# Patient Record
Sex: FEMALE | Race: BLACK OR AFRICAN AMERICAN | NOT HISPANIC OR LATINO | Employment: FULL TIME | URBAN - METROPOLITAN AREA
[De-identification: names, ages, dates, MRNs, and addresses within clinical notes are randomized per-mention and may not be internally consistent; named-entity substitution may affect disease eponyms.]

---

## 2017-03-29 ENCOUNTER — HOSPITAL ENCOUNTER (OUTPATIENT)
Dept: RADIOLOGY | Facility: HOSPITAL | Age: 57
Discharge: HOME | End: 2017-03-29
Attending: INTERNAL MEDICINE
Payer: COMMERCIAL

## 2017-03-29 ENCOUNTER — OFFICE VISIT (OUTPATIENT)
Dept: INTERNAL MEDICINE | Facility: CLINIC | Age: 57
End: 2017-03-29
Payer: COMMERCIAL

## 2017-03-29 VITALS
WEIGHT: 215.19 LBS | SYSTOLIC BLOOD PRESSURE: 120 MMHG | HEIGHT: 66 IN | HEART RATE: 70 BPM | OXYGEN SATURATION: 99 % | BODY MASS INDEX: 34.58 KG/M2 | DIASTOLIC BLOOD PRESSURE: 80 MMHG

## 2017-03-29 DIAGNOSIS — M54.31 SCIATICA OF RIGHT SIDE: ICD-10-CM

## 2017-03-29 DIAGNOSIS — M17.0 PRIMARY OSTEOARTHRITIS OF BOTH KNEES: ICD-10-CM

## 2017-03-29 DIAGNOSIS — M25.562 ACUTE PAIN OF BOTH KNEES: ICD-10-CM

## 2017-03-29 DIAGNOSIS — M25.561 ACUTE PAIN OF BOTH KNEES: Primary | ICD-10-CM

## 2017-03-29 DIAGNOSIS — M25.562 ACUTE PAIN OF BOTH KNEES: Primary | ICD-10-CM

## 2017-03-29 DIAGNOSIS — M25.561 ACUTE PAIN OF BOTH KNEES: ICD-10-CM

## 2017-03-29 DIAGNOSIS — E66.01 MORBID OBESITY, UNSPECIFIED OBESITY TYPE: ICD-10-CM

## 2017-03-29 PROCEDURE — 99213 OFFICE O/P EST LOW 20 MIN: CPT | Mod: S$GLB,,, | Performed by: INTERNAL MEDICINE

## 2017-03-29 PROCEDURE — 73562 X-RAY EXAM OF KNEE 3: CPT | Mod: 50,TC

## 2017-03-29 PROCEDURE — 99999 PR PBB SHADOW E&M-EST. PATIENT-LVL IV: CPT | Mod: PBBFAC,,, | Performed by: INTERNAL MEDICINE

## 2017-03-29 PROCEDURE — 73562 X-RAY EXAM OF KNEE 3: CPT | Mod: 26,50,, | Performed by: RADIOLOGY

## 2017-03-29 PROCEDURE — 1160F RVW MEDS BY RX/DR IN RCRD: CPT | Mod: S$GLB,,, | Performed by: INTERNAL MEDICINE

## 2017-03-29 NOTE — MR AVS SNAPSHOT
"    [facility]  [address]  [place] [address]  Phone: [phone]  Fax: [phone]                  Albertina Sim   3/29/2017 8:40 AM   Office Visit    Description:  Female : 1960   Provider:  Lindsay Cohen MD   Department:  [facility]           Reason for Visit     Leg Pain           Diagnoses this Visit        Comments    Acute pain of both knees    -  Primary     Morbid obesity, unspecified obesity type         Sciatica of right side         Primary osteoarthritis of both knees                To Do List           Goals (5 Years of Data)     None      [facility]     [facility] Care [facility] -  Assistance  Registered nurses in the [facility] Center provide clinical advisement, health education, appointment booking, and other advisory services.  Call for this free service at [phone].             Medications           Message regarding Medications     Verify the changes and/or additions to your medication regime listed below are the same as discussed with your clinician today.  If any of these changes or additions are incorrect, please notify your healthcare provider.        STOP taking these medications     naproxen sodium (ANAPROX) 220 MG tablet Take 220 mg by mouth every 12 (twelve) hours.           Verify that the below list of medications is an accurate representation of the medications you are currently taking.  If none reported, the list may be blank. If incorrect, please contact your healthcare provider. Carry this list with you in case of emergency.           Current Medications     LORATADINE (CLARITIN ORAL) Take by mouth.           Clinical Reference Information           Your Vitals Were     BP Pulse Height Weight SpO2 BMI    120/80 70 5' 6" (1.676 m) 97.6 kg (215 lb 2.7 oz) 99% 34.73 kg/m2      Blood Pressure          Most Recent Value    BP  120/80      Allergies as of 3/29/2017     No Known Drug Allergies    "   Immunizations Administered on Date of Encounter - 3/29/2017     None      Orders Placed During Today's Visit      Normal Orders This Visit    Ambulatory Referral to Orthopedics     Ambulatory Referral to Physical/Occupational Therapy     Future Labs/Procedures Expected by Expires    X-Ray Knee 3 View Bilateral  3/29/2017 3/29/2018      Language Assistance Services     ATTENTION: Language assistance services are available, free of charge. Please call [phone].      ATENCIÓN: Si habla español, tiene a benavides disposición servicios gratuitos de asistencia lingüística. Llame al [phone].     CHÚ Ý: N?u b?n nói Ti?ng Vi?t, có các d?ch v? h? tr? ngôn ng? mi?n phí dành cho b?n. G?i s? [phone].         [facility] complies with applicable Federal civil rights laws and does not discriminate on the basis of race, color, national origin, age, disability, or sex.

## 2017-03-30 ENCOUNTER — OFFICE VISIT (OUTPATIENT)
Dept: ORTHOPEDICS | Facility: CLINIC | Age: 57
End: 2017-03-30
Payer: COMMERCIAL

## 2017-03-30 VITALS
HEART RATE: 73 BPM | DIASTOLIC BLOOD PRESSURE: 80 MMHG | BODY MASS INDEX: 34.58 KG/M2 | WEIGHT: 215.19 LBS | HEIGHT: 66 IN | SYSTOLIC BLOOD PRESSURE: 114 MMHG | RESPIRATION RATE: 16 BRPM

## 2017-03-30 DIAGNOSIS — M17.0 OSTEOARTHRITIS OF BOTH KNEES, UNSPECIFIED OSTEOARTHRITIS TYPE: Primary | ICD-10-CM

## 2017-03-30 PROCEDURE — 99203 OFFICE O/P NEW LOW 30 MIN: CPT | Mod: S$GLB,,, | Performed by: PHYSICIAN ASSISTANT

## 2017-03-30 PROCEDURE — 99999 PR PBB SHADOW E&M-EST. PATIENT-LVL III: CPT | Mod: PBBFAC,,, | Performed by: PHYSICIAN ASSISTANT

## 2017-03-30 PROCEDURE — 1160F RVW MEDS BY RX/DR IN RCRD: CPT | Mod: S$GLB,,, | Performed by: PHYSICIAN ASSISTANT

## 2017-03-30 RX ORDER — MELOXICAM 15 MG/1
15 TABLET ORAL DAILY
Qty: 30 TABLET | Refills: 0 | Status: SHIPPED | OUTPATIENT
Start: 2017-03-30 | End: 2017-04-29

## 2017-03-30 NOTE — LETTER
March 31, 2017      Lindsay Cohen MD  [address]  [place] [address]           [facility]  [address]  [facility] [address]  Phone: [phone]          Patient: Albertina Sim   MR Number: [id number]   YOB: 1960   Date of Visit: 3/30/2017       Dear Dr. Lindsay Cohen:    Thank you for referring Albertina Sim to me for evaluation. Attached you will find relevant portions of my assessment and plan of care.    If you have questions, please do not hesitate to call me. I look forward to following Albertina Sim along with you.    Sincerely,    Dolores Canela PA-C    Enclosure  CC:  No Recipients    If you would like to receive this communication electronically, please contact [email or web address] or [phone] to request more information on [facility] Link access.    For providers and/or their staff who would like to refer a patient to [facility], please contact us through our one-stop-shop provider referral line, [facility], at [phone].    If you feel you have received this communication in error or would no longer like to receive these types of communications, please e-mail [email or web address]

## 2017-03-31 NOTE — PROGRESS NOTES
Subjective:      Patient ID: Albertina Sim is a 57 y.o. female.    Chief Complaint: Pain of the Left Knee and Pain of the Right Knee    HPI    Patient is a 57 year old female who presents to clinic with chief complaint of a-traumatic intermittent bilateral right greater than left knee pain x years. Pain is increased with walking. Patient has taken Aleve which helps some. Patient denied locking catching popping feeling unstable.   She is symptom free today.      Review of Systems   Constitution: Negative for chills and fever.   Cardiovascular: Negative for chest pain.   Respiratory: Negative for cough and shortness of breath.    Skin: Negative for color change, dry skin, itching, nail changes, poor wound healing and rash.   Musculoskeletal: Positive for joint pain.   Neurological: Negative for dizziness.   Psychiatric/Behavioral: Negative for altered mental status. The patient is not nervous/anxious.    All other systems reviewed and are negative.        Objective:            General    Constitutional: She is oriented to person, place, and time. She appears well-developed and well-nourished. No distress.   HENT:   Head: Atraumatic.   Eyes: Conjunctivae are normal.   Cardiovascular: Normal rate.    Pulmonary/Chest: Effort normal.   Neurological: She is alert and oriented to person, place, and time.   Psychiatric: She has a normal mood and affect. Her behavior is normal.           Right Knee Exam     Tenderness   The patient is tender to palpation of the medial joint line.    Range of Motion   Extension: normal   Flexion: 110     Tests   Ligament Examination Lachman: normal (-1 to 2mm) PCL-Posterior Drawer: normal (0 to 2mm)     MCL - Valgus: normal (0 to 2mm)  LCL - Varus: normal    Other   Sensation: normal    Left Knee Exam     Inspection   Swelling: absent  Bruising: absent    Tenderness   The patient is experiencing no tenderness.         Range of Motion   The patient has normal left knee ROM.    Tests   Stability  Lachman: normal (-1 to 2mm) PCL-Posterior Drawer: normal (0 to 2mm)  MCL - Valgus: normal (0 to 2mm)  LCL - Varus: normal (0 to 2mm)    Other   Sensation: normal    Muscle Strength   Right Lower Extremity   Quadriceps:  5/5   Hamstrin/5   Left Lower Extremity   Quadriceps:  5/5   Hamstrin/5       RADS: no fracture or dislocation, advanced degenerative changes to right knee.         Assessment:       Encounter Diagnosis   Name Primary?    Osteoarthritis of both knees, unspecified osteoarthritis type Yes          Plan:       Discussed treatment options with patient including lifestyle modifications, oral medication, injection and surgical consult. At this time patient would like to:   - rest ice elevate  - life style modification  - Order placed for Mobic x 2 weeks\  - return to clinic as needed, if continue pain then consider injection.

## 2017-03-31 NOTE — PROGRESS NOTES
Subjective:       Patient ID: Albertina Sim is a 57 y.o. female.    Chief Complaint: Leg Pain (both legs)  This note was created with the use of Dragon dictation  She is most concerned about pain in her knees.  Her right knee is worse than her left.  She has a small min of fluid.  She is taking [place] to [facility], in August and needs to be able to walk.  Her knees are painful she's taking 2 Aleve in the morning and 2 Aleve in the evening.    She's not sleeping well.  She is concerned about her weight gain    She is able to ride a bicycle.  She is not able to ride her bike to and from work.  She is working at the [employer].  HPI  Review of Systems   Constitutional: Negative for activity change, appetite change, chills, fatigue, fever and unexpected weight change.   HENT: Negative for hearing loss.    Eyes: Negative for visual disturbance.   Respiratory: Negative for cough, chest tightness, shortness of breath and wheezing.    Cardiovascular: Negative for chest pain, palpitations and leg swelling.   Gastrointestinal: Negative for abdominal pain, constipation, nausea and vomiting.   Genitourinary: Negative for dysuria, frequency and urgency.   Musculoskeletal: Positive for arthralgias, gait problem and joint swelling. Negative for back pain and myalgias.   Skin: Negative for rash.   Neurological: Negative for light-headedness and headaches.   Psychiatric/Behavioral: Negative for dysphoric mood and sleep disturbance. The patient is not nervous/anxious.        Objective:      Physical Exam   Constitutional: She appears well-nourished.   HENT:   Head: Atraumatic.   Eyes: Conjunctivae are normal. No scleral icterus.   Neck: Neck supple.   Cardiovascular: Normal rate and regular rhythm.    Pulmonary/Chest: Effort normal and breath sounds normal.   Abdominal: Soft. There is no tenderness.   Musculoskeletal: She exhibits no edema.   Slight warmth, no redness, ballotable effusion mostly in the popliteal  fossa   Lymphadenopathy:     She has no cervical adenopathy.   Neurological: She is alert.   Skin: Skin is warm and dry.   Psychiatric: She has a normal mood and affect. Her behavior is normal.   Nursing note and vitals reviewed.      Assessment:       1. Acute pain of both knees    2. Morbid obesity, unspecified obesity type    3. Sciatica of right side    4. Primary osteoarthritis of both knees        Plan:   Albertina was seen today for leg pain.    Diagnoses and all orders for this visit:    Acute pain of both knees  -     X-Ray Knee 3 View Bilateral; Future  -     Ambulatory Referral to Orthopedics    Morbid obesity, unspecified obesity type.  Different strategies discussed.    Sciatica of right side  -     X-Ray Knee 3 View Bilateral; Future  -     Ambulatory Referral to Orthopedics    Primary osteoarthritis of both knees.  -     X-Ray Knee 3 View Bilateral; Future  -     Ambulatory Referral to Physical/Occupational Therapy  -     Ambulatory Referral to Orthopedics

## 2017-04-06 ENCOUNTER — CLINICAL SUPPORT (OUTPATIENT)
Dept: REHABILITATION | Facility: HOSPITAL | Age: 57
End: 2017-04-06
Attending: INTERNAL MEDICINE
Payer: COMMERCIAL

## 2017-04-06 DIAGNOSIS — M25.562 CHRONIC PAIN OF LEFT KNEE: ICD-10-CM

## 2017-04-06 DIAGNOSIS — G89.29 CHRONIC PAIN OF LEFT KNEE: ICD-10-CM

## 2017-04-06 DIAGNOSIS — M17.12 PRIMARY OSTEOARTHRITIS OF LEFT KNEE: Primary | ICD-10-CM

## 2017-04-06 PROCEDURE — G8978 MOBILITY CURRENT STATUS: HCPCS | Mod: CK | Performed by: INTERNAL MEDICINE

## 2017-04-06 PROCEDURE — 97162 PT EVAL MOD COMPLEX 30 MIN: CPT | Performed by: INTERNAL MEDICINE

## 2017-04-06 PROCEDURE — G8979 MOBILITY GOAL STATUS: HCPCS | Mod: CJ | Performed by: INTERNAL MEDICINE

## 2017-04-06 PROCEDURE — 97110 THERAPEUTIC EXERCISES: CPT | Performed by: INTERNAL MEDICINE

## 2017-04-06 NOTE — PLAN OF CARE
Please sign and return plan of care. Thank you for this referral.    Physician:Lindsay Cohen MD  Diagnosis:   Encounter Diagnoses   Name Primary?    Primary osteoarthritis of left knee Yes    Chronic pain of left knee       Orders:  Physical Therapy evaluate and treat  Date of eval: 4/6/17    Subjective:    Onset of pain /Mechanism of Onset:  R knee pain and edema > 2 yrs. Also L knee pain and lbp/ occas L sciatic pain.    Chief complaint:  R ant knee pain and wants to learn some strengthening ex. Also trying to lose weight to help with knee and bp but has diff ex due to increased pain.  Radicular symptoms:  L LE occas with prolonged walking such as 74997 steps per day while on a trip in march.  Aggravating factors:   Walking > 1 block, standing  Easing factors:  Rest. Has an ice pack at home but doesn't regularly use it  :   Previous functional status includes    Walking up to 10343 steps per day on 2 trips in last 3 mos  Current functional status:   yoga    Medication:  Reviewed in EPIC  Medical history : unremarkable      Special tests:    MRI:   Reports dx 2000 with bulging disc lb    Xray    3/29/17 AP standing knees, lateral view both knees and sunrise view of patella.  There is severe narrowing of the right medial femoral-tibial compartment with associated osteophyte formation.  Left femoral-tibial joint is well-maintained.  Degenerative change about the right patellofemoral joint as well.  No effusion.    Impression significant hypertrophic degenerative change right knee.    2014 Multilevel degenerative changes as discussed above, somewhat more pronounced than on the 2006 examination noted above, with progressive disc narrowing and more pronounced vertebral endplate spurring seen on the current study.  No evidence of compression   fracture.    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  L LE 10/10 occas, 0 presently  Pts goals:  Decrease pain, ex without making sx's worse    OBJECTIVE :  Amb I genu varus R   Pleasant, nad    4/6 Functional Limitations Reports - G Codes  Category: mobility  Tool: FOTO knee  Score: 57   TEST SCORE  Modifier  Impairment Limitation Restriction    80/80  CH  0 % impaired, limited or restricted   64-79/80  CI  @ least 1% but less than 20% impaired, limited or restricted   49-63/80  CJ  @ least 20%<40% impaired, limited or restricted   33-48/80  CK  @ least 40%<60% impaired, limited or restricted   17-32/80  CL  @ least 60% <80% impaired, limited or restricted   1-16/80  CM  @ least 80%<100% impaired limited or restricted   0/80  CN  100% impaired, limited or restricted     Current/  40-60  Goal/ : 20-40  Discharge/   NA      MMT:    Hip flex B 5  Hip abd R5, L 4-  Hip ext R 5, L 4+  Knee ext R 4, L 4+  Knee flex R 4+. L 5  DF B 5      AROM:  Knee ext  R- 3, L + 5  Knee flex R 120 ant knee pain, L 130    Flexibility testing:  HS flex WFL B    Special tests:    lachman's - R  Varus/ valgus neg R    Palpation/ joint mob:   Mod edema R Knee, decreased R patellar jm and quad contraction    TREATMENT:    Pt was provided with a written copy of exercises to perform as tolerated at home.  Pt performed rom and strengthening ex for le and core including:    Sl hip abd 15x  slr B 15x   QS 10x  Prone flex strap 2 min  Prone hang 3 min- 10 min  Prone hip ext 15x  laq 15x  Gr 2 patellar jm inf / med R 20 sec x 3  Ther ex time: 20 min  Exercises were reviewed and pt was able to demonstrate them prior to the end of the session.   Modalities:  Np.   Pt   was provided educational information, including: ice 10-20 min after walking in park  Ed in benefits of pool PT; she doesn't want to do formal pool PT now. Hasn't activated her free mo in [facility] but plans to start soon. Ed in trying to walk  in pool for LBP and R knee discomfort.    ASSESSMENT:  PT diagnosis: R knee oa, decreased rom and pain   Patient can benefit from outpatient physical therapy and a home program  Prognosis is Good.    Medical necessity is demonstrated by the following  IMPAIRMENTS:  Unable to participate in daily activities, Pain limits function of effected part for some activities, Requires skilled supervision to complete and progress HEP, Weakness and Edema    GOALS:    12_   weeks. Pt agrees with goals.  1. Independent with HEP.  2. Report decreased    R knee    pain  <   / =  3  /10 with adls such as walking  3. Increased MMT  for  R KE, L hip abd   4. Increased arom  for  R knee 0-125  5. Improved FOTO score mobility knee to 20-40% mobility      History  Co-morbidities and personal factors that may impact the plan of care  Moderate    Evolving Clinical Presentation    Co-morbidities:       Personal Factors:     Body regions    Body systems    Activity Limitations    Participation Restrictons      1- 2  Personal factors/ combordities:     LBP hx, L sciatica and weakness increasing R LE compensatio    Address  3 + elements:     ROM impaired- R Knee  MMT impaired- LE  Gait impaired R genu varus  Decreased FOTO score            PLAN:  Outpatient physical therapy 1- 2  times weekly to include: pt ed, hep, therapeutic exercises, neuromuscular re-education/ balance exercises, joint mobilizations, modalities prn, and aquatic therapy.  Pt may see PTA as part of treatment plan.  Cont PT for  12        weeks.   I certify the need for these services   furnished under this plan of treatment and while under my   care.____________________________________ Physician/Referring Practitioner Date   of Signature

## 2017-04-06 NOTE — PROGRESS NOTES
Physician:Lindsay Cohen MD  Diagnosis:   Encounter Diagnoses   Name Primary?    Primary osteoarthritis of left knee Yes    Chronic pain of left knee       Orders:  Physical Therapy evaluate and treat  Date of eval: 4/6/17    Subjective:    Onset of pain /Mechanism of Onset:  R knee pain and edema > 2 yrs. Also L knee pain and lbp/ occas L sciatic pain.    Chief complaint:  R ant knee pain and wants to learn some strengthening ex. Also trying to lose weight to help with knee and bp but has diff ex due to increased pain.  Radicular symptoms:  L LE occas with prolonged walking such as 79494 steps per day while on a trip in march.  Aggravating factors:   Walking > 1 block, standing  Easing factors:  Rest. Has an ice pack at home but doesn't regularly use it  :   Previous functional status includes    Walking up to 02720 steps per day on 2 trips in last 3 mos  Current functional status:   yoga    Medication:  Reviewed in EPIC  Medical history : unremarkable      Special tests:    MRI:   Reports dx 2000 with bulging disc lb    Xray    3/29/17 AP standing knees, lateral view both knees and sunrise view of patella.  There is severe narrowing of the right medial femoral-tibial compartment with associated osteophyte formation.  Left femoral-tibial joint is well-maintained.  Degenerative change about the right patellofemoral joint as well.  No effusion.    Impression significant hypertrophic degenerative change right knee.    2014 Multilevel degenerative changes as discussed above, somewhat more pronounced than on the 2006 examination noted above, with progressive disc narrowing and more pronounced vertebral endplate spurring seen on the current study.  No evidence of compression   fracture.    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  L LE 10/10 occas, 0 presently  Pts goals:  Decrease pain, ex without making sx's worse    OBJECTIVE :  Amb I genu varus R   Pleasant, nad    4/6 Functional Limitations Reports - G Codes  Category: mobility  Tool: FOTO knee  Score: 57   TEST SCORE  Modifier  Impairment Limitation Restriction    80/80  CH  0 % impaired, limited or restricted   64-79/80  CI  @ least 1% but less than 20% impaired, limited or restricted   49-63/80  CJ  @ least 20%<40% impaired, limited or restricted   33-48/80  CK  @ least 40%<60% impaired, limited or restricted   17-32/80  CL  @ least 60% <80% impaired, limited or restricted   1-16/80  CM  @ least 80%<100% impaired limited or restricted   0/80  CN  100% impaired, limited or restricted     Current/  40-60  Goal/ : 20-40  Discharge/   NA      MMT:    Hip flex B 5  Hip abd R5, L 4-  Hip ext R 5, L 4+  Knee ext R 4, L 4+  Knee flex R 4+. L 5  DF B 5      AROM:  Knee ext  R- 3, L + 5  Knee flex R 120 ant knee pain, L 130    Flexibility testing:  HS flex WFL B    Special tests:    lachman's - R  Varus/ valgus neg R    Palpation/ joint mob:   Mod edema R Knee, decreased R patellar jm and quad contraction    TREATMENT:    Pt was provided with a written copy of exercises to perform as tolerated at home.  Pt performed rom and strengthening ex for le and core including:    Sl hip abd 15x  slr B 15x   QS 10x  Prone flex strap 2 min  Prone hang 3 min- 10 min  Prone hip ext 15x  laq 15x  Gr 2 patellar jm inf / med R 20 sec x 3  Ther ex time: 20 min  Exercises were reviewed and pt was able to demonstrate them prior to the end of the session.   Modalities:  Np.   Pt   was provided educational information, including: ice 10-20 min after walking in park  Ed in benefits of pool PT; she doesn't want to do formal pool PT now. Hasn't activated her free mo in [facility] but plans to start soon. Ed in trying to walk in pool for LBP and R knee discomfort.    ASSESSMENT:  PT diagnosis: R knee  oa, decreased rom and pain   Patient can benefit from outpatient physical therapy and a home program  Prognosis is Good.    Medical necessity is demonstrated by the following  IMPAIRMENTS:  Unable to participate in daily activities, Pain limits function of effected part for some activities, Requires skilled supervision to complete and progress HEP, Weakness and Edema    GOALS:    12_   weeks. Pt agrees with goals.  1. Independent with HEP.  2. Report decreased    R knee    pain  <   / =  3  /10 with adls such as walking  3. Increased MMT  for  R KE, L hip abd   4. Increased arom  for  R knee 0-125  5. Improved FOTO score mobility knee to 20-40% mobility      History  Co-morbidities and personal factors that may impact the plan of care  Moderate    Evolving Clinical Presentation    Co-morbidities:       Personal Factors:     Body regions    Body systems    Activity Limitations    Participation Restrictons      1- 2  Personal factors/ combordities:     LBP hx, L sciatica and weakness increasing R LE compensatio    Address  3 + elements:     ROM impaired- R Knee  MMT impaired- LE  Gait impaired R genu varus  Decreased FOTO score            PLAN:  Outpatient physical therapy 1- 2  times weekly to include: pt ed, hep, therapeutic exercises, neuromuscular re-education/ balance exercises, joint mobilizations, modalities prn, and aquatic therapy.  Pt may see PTA as part of treatment plan.  Cont PT for  12        weeks.   I certify the need for these services   furnished under this plan of treatment and while under my   care.____________________________________ Physician/Referring Practitioner Date   of Signature

## 2017-04-20 ENCOUNTER — CLINICAL SUPPORT (OUTPATIENT)
Dept: REHABILITATION | Facility: HOSPITAL | Age: 57
End: 2017-04-20
Attending: INTERNAL MEDICINE
Payer: COMMERCIAL

## 2017-04-20 DIAGNOSIS — M25.562 CHRONIC PAIN OF LEFT KNEE: Primary | ICD-10-CM

## 2017-04-20 DIAGNOSIS — G89.29 CHRONIC PAIN OF LEFT KNEE: Primary | ICD-10-CM

## 2017-04-20 PROCEDURE — 97110 THERAPEUTIC EXERCISES: CPT | Performed by: INTERNAL MEDICINE

## 2017-04-20 NOTE — PROGRESS NOTES
Physician:Lindsay Cohen MD  Diagnosis:   Encounter Diagnoses   Name Primary?    Primary osteoarthritis of left knee Yes    Chronic pain of left knee       Orders:  Physical Therapy evaluate and treat  Date of eval: 4/6/17    Subjective:  No pain presently, but bp with washing car R LB few days ago. Wx with hip ex.     OBJECTIVE :  Amb I genu varus R   Pleasant, nad    4/6 Functional Limitations Reports - G Codes  Category: mobility  Tool: FOTO knee  Score: 57   TEST SCORE  Modifier  Impairment Limitation Restriction    80/80  CH  0 % impaired, limited or restricted   64-79/80  CI  @ least 1% but less than 20% impaired, limited or restricted   49-63/80  CJ  @ least 20%<40% impaired, limited or restricted   33-48/80  CK  @ least 40%<60% impaired, limited or restricted   17-32/80  CL  @ least 60% <80% impaired, limited or restricted   1-16/80  CM  @ least 80%<100% impaired limited or restricted   0/80  CN  100% impaired, limited or restricted     Current/  40-60  Goal/ : 20-40  Discharge/   NA      4/ 6 MMT:  Hip abd R5, L 4-  Hip ext R 5, L 4+  Knee ext R 4, L 4+  Knee flex R 4+. L 5        4/20 AROM:  Knee ext  R- 2, L + 5  Knee flex R 120 ant knee pain, L 130    4/6 Flexibility testing:  HS flex WFL B    4/ 6 Special tests:    lachman's - R  Varus/ valgus neg R    Palpation/ joint mob:   Mod edema R Knee, decreased R patellar jm and quad contraction    TREATMENT:    Pt was provided with a written copy of exercises to perform as tolerated at home.  Pt performed rom and strengthening ex for le and core including:  recumb bike 5 min  gss slant 90 sec  Sl hip abd 15x 2 B  slr B 15x 2   QS 10x np  Prone flex strap 2 min B  Prone hang 5 min 1 #  Prone hip ext 15x 2  Clams gtb 30x  laq 15x 2  Bridges with tball 20x    Gr 2 patellar jm inf / med R 20 sec x 3    Ther ex time: 50 min  Exercises were reviewed and pt was able to demonstrate them prior to the end of the session.   Modalities:  Np.     Ed  in benefits of pool PT; she doesn't want to do formal pool PT now. Hasn't activated her free mo in [facility] but plans to start soon. Ed in trying to walk in pool for LBP and R knee discomfort.    ASSESSMENT:  Progressing well with improved ex ability and decreased pain. No c/o during session but needs cues for core stab. Patient can benefit from outpatient physical therapy and a home program  Prognosis is Good.    Medical necessity is demonstrated by the following  IMPAIRMENTS:  Unable to participate in daily activities, Pain limits function of effected part for some activities, Requires skilled supervision to complete and progress HEP, Weakness and Edema    GOALS:    12_   weeks. Pt agrees with goals.  1. Independent with HEP.  2. Report decreased    R knee    pain  <   / =  3  /10 with adls such as walking  3. Increased MMT  for  R KE, L hip abd   4. Increased arom  for  R knee 0-125  5. Improved FOTO score mobility knee to 20-40% mobility            PLAN:  Outpatient physical therapy 1- 2  times weekly to include: pt ed, hep, therapeutic exercises, neuromuscular re-education/ balance exercises, joint mobilizations, modalities prn, and aquatic therapy.  Pt may see PTA as part of treatment plan.  Cont PT for  11        weeks.

## 2017-05-29 ENCOUNTER — DOCUMENTATION ONLY (OUTPATIENT)
Dept: REHABILITATION | Facility: HOSPITAL | Age: 57
End: 2017-05-29

## 2017-05-29 NOTE — PT/OT/SLP DISCHARGE
Physician:Lindsya Cohen MD  Diagnosis:   Encounter Diagnoses   Name Primary?    Primary osteoarthritis of left knee Yes    Chronic pain of left knee        Orders:  Physical Therapy evaluate and treat  Date of eval: 4/6/17 4/6 Functional Limitations Reports - G Codes  Category: mobility  Tool: FOTO knee    Goal/ : 20-40  Discharge/  40-60%      TREATMENT:     Pt ed, hep, ex, nmr, jm          GOALS:    12_   weeks. Unable to assess if goals met due to pt only attending 2 visits    1. Independent with HEP.  2. Report decreased    R knee    pain  <   / =  3  /10 with adls such as walking  3. Increased MMT  for  R KE, L hip abd   4. Increased arom  for  R knee 0-125  5. Improved FOTO score mobility knee to 20-40% mobility

## 2017-11-24 ENCOUNTER — HOSPITAL ENCOUNTER (OUTPATIENT)
Dept: RADIOLOGY | Facility: HOSPITAL | Age: 57
Discharge: HOME | End: 2017-11-24
Attending: INTERNAL MEDICINE
Payer: COMMERCIAL

## 2017-11-24 VITALS — WEIGHT: 215 LBS | BODY MASS INDEX: 34.55 KG/M2 | HEIGHT: 66 IN

## 2017-11-24 DIAGNOSIS — M25.561 CHRONIC PAIN OF BOTH KNEES: ICD-10-CM

## 2017-11-24 DIAGNOSIS — E66.01 MORBID OBESITY, UNSPECIFIED OBESITY TYPE: ICD-10-CM

## 2017-11-24 DIAGNOSIS — D57.3 SICKLE CELL TRAIT: ICD-10-CM

## 2017-11-24 DIAGNOSIS — Z12.39 SCREENING FOR BREAST CANCER: ICD-10-CM

## 2017-11-24 DIAGNOSIS — Z00.00 ANNUAL PHYSICAL EXAM: ICD-10-CM

## 2017-11-24 DIAGNOSIS — G89.29 CHRONIC PAIN OF BOTH KNEES: ICD-10-CM

## 2017-11-24 DIAGNOSIS — M25.562 CHRONIC PAIN OF BOTH KNEES: ICD-10-CM

## 2017-11-24 PROCEDURE — 77067 SCR MAMMO BI INCL CAD: CPT | Mod: 26,,, | Performed by: RADIOLOGY

## 2017-11-24 PROCEDURE — 77067 SCR MAMMO BI INCL CAD: CPT | Mod: TC

## 2018-01-30 ENCOUNTER — OFFICE VISIT (OUTPATIENT)
Dept: INTERNAL MEDICINE | Facility: CLINIC | Age: 58
End: 2018-01-30
Payer: COMMERCIAL

## 2018-01-30 VITALS
WEIGHT: 221.56 LBS | SYSTOLIC BLOOD PRESSURE: 124 MMHG | DIASTOLIC BLOOD PRESSURE: 70 MMHG | HEIGHT: 66 IN | OXYGEN SATURATION: 99 % | HEART RATE: 72 BPM | BODY MASS INDEX: 35.61 KG/M2

## 2018-01-30 DIAGNOSIS — M17.11 PRIMARY OSTEOARTHRITIS OF RIGHT KNEE: ICD-10-CM

## 2018-01-30 DIAGNOSIS — Z00.00 ANNUAL PHYSICAL EXAM: Primary | ICD-10-CM

## 2018-01-30 DIAGNOSIS — D57.3 SICKLE CELL TRAIT: ICD-10-CM

## 2018-01-30 DIAGNOSIS — R63.5 WEIGHT GAIN: ICD-10-CM

## 2018-01-30 PROCEDURE — 99396 PREV VISIT EST AGE 40-64: CPT | Mod: S$GLB,,, | Performed by: INTERNAL MEDICINE

## 2018-01-30 PROCEDURE — 99999 PR PBB SHADOW E&M-EST. PATIENT-LVL III: CPT | Mod: PBBFAC,,, | Performed by: INTERNAL MEDICINE

## 2018-01-31 NOTE — PROGRESS NOTES
Subjective:       Patient ID: Albertina Sim is a 58 y.o. female.    Chief Complaint: Annual Exam  Wellness  She is willing to update screenings and immunizations.  See past medical history, social history, and family history.  HPI  Review of Systems   Constitutional: Negative for activity change, appetite change, chills, fatigue, fever and unexpected weight change.   HENT: Negative for dental problem, hearing loss, rhinorrhea, tinnitus, trouble swallowing and voice change.    Eyes: Negative for discharge and visual disturbance.   Respiratory: Negative for cough, chest tightness, shortness of breath and wheezing.    Cardiovascular: Negative for chest pain, palpitations and leg swelling.   Gastrointestinal: Negative for abdominal pain, blood in stool, constipation, diarrhea, nausea and vomiting.   Endocrine: Negative for polydipsia and polyuria.   Genitourinary: Negative for difficulty urinating, dysuria, frequency, hematuria, menstrual problem and urgency.   Musculoskeletal: Negative for arthralgias, back pain, gait problem, joint swelling, myalgias, neck pain and neck stiffness.   Skin: Negative for rash.   Neurological: Negative for dizziness, tremors, speech difficulty, weakness, light-headedness and headaches.   Psychiatric/Behavioral: Negative for confusion, dysphoric mood and sleep disturbance. The patient is not nervous/anxious.        Objective:      Physical Exam   Constitutional: She is oriented to person, place, and time. She appears well-developed and well-nourished. No distress.   HENT:   Head: Normocephalic and atraumatic.   Right Ear: External ear normal.   Left Ear: External ear normal.   Nose: Nose normal.   Mouth/Throat: Oropharynx is clear and moist. No oropharyngeal exudate.   Eyes: Conjunctivae and EOM are normal. Pupils are equal, round, and reactive to light. Right eye exhibits no discharge. No scleral icterus.   Neck: Normal range of motion and full passive range of motion without pain. Neck  supple. No spinous process tenderness and no muscular tenderness present. Carotid bruit is not present. No thyromegaly present.   Cardiovascular: Normal rate, regular rhythm, S1 normal, S2 normal, normal heart sounds and intact distal pulses.  Exam reveals no gallop and no friction rub.    No murmur heard.  Pulmonary/Chest: Effort normal and breath sounds normal. No respiratory distress. She has no wheezes. She has no rales. She exhibits no tenderness.   Abdominal: Soft. Bowel sounds are normal. She exhibits no distension and no mass. There is no tenderness. There is no rebound and no guarding.   Genitourinary: Pelvic exam was performed with patient supine. Uterus is not deviated, not enlarged, not fixed and not tender. Cervix exhibits no motion tenderness, no discharge and no friability. Right adnexum displays no mass, no tenderness and no fullness. Left adnexum displays no mass, no tenderness and no fullness.   Musculoskeletal: Normal range of motion. She exhibits no edema or tenderness.   Lymphadenopathy:        Head (right side): No submental and no submandibular adenopathy present.        Head (left side): No submental and no submandibular adenopathy present.     She has no cervical adenopathy.        Right cervical: No superficial cervical, no deep cervical and no posterior cervical adenopathy present.       Left cervical: No superficial cervical, no deep cervical and no posterior cervical adenopathy present.        Right axillary: No pectoral and no lateral adenopathy present.        Left axillary: No pectoral and no lateral adenopathy present.       Right: No supraclavicular adenopathy present.        Left: No supraclavicular adenopathy present.   Neurological: She is alert and oriented to person, place, and time. She has normal reflexes. No cranial nerve deficit. She exhibits normal muscle tone. Coordination normal.   Skin: Skin is warm and dry. No rash noted.   Psychiatric: She has a normal mood and  affect. Her behavior is normal. Her mood appears not anxious. Her speech is not rapid and/or pressured. She is not agitated. She does not exhibit a depressed mood.   Normal behavior, thought content, insight and judgement.       Assessment:       1. Annual physical exam    2. BMI 35.0-35.9,adult    3. Sickle cell trait    4. Weight gain    5. Primary osteoarthritis of right knee        Plan:   Albertina was seen today for annual exam.    Diagnoses and all orders for this visit:    Annual physical exam. She is grieving normally. She has not had any blood tests done in quite a while.  -     CBC auto differential; Future  -     Comprehensive metabolic panel; Future  -     Lipid panel; Future  -     Hemoglobin A1c; Future  -     TSH; Future  -     Vitamin D; Future    BMI 35.0-35.9,adult, Weight gain.  Stress eating.  She is going to try to come to terms with this and see if she can setModest goals, Achievable goals    Sickle cell trait, Monitor.    Primary osteoarthritis of right knee.  She is bicycling and walking daily.  She realizes weight loss would be helpful    Follow-up in about 1 year (around 1/30/2019) for for PE.

## 2018-02-10 ENCOUNTER — LAB VISIT (OUTPATIENT)
Dept: LAB | Facility: HOSPITAL | Age: 58
End: 2018-02-10
Attending: INTERNAL MEDICINE
Payer: COMMERCIAL

## 2018-02-10 DIAGNOSIS — Z00.00 ANNUAL PHYSICAL EXAM: ICD-10-CM

## 2018-02-10 LAB
25(OH)D3+25(OH)D2 SERPL-MCNC: 18 NG/ML
ALBUMIN SERPL BCP-MCNC: 3.9 G/DL
ALP SERPL-CCNC: 92 U/L
ALT SERPL W/O P-5'-P-CCNC: 12 U/L
ANION GAP SERPL CALC-SCNC: 9 MMOL/L
AST SERPL-CCNC: 21 U/L
BASOPHILS # BLD AUTO: 0.04 K/UL
BASOPHILS NFR BLD: 0.6 %
BILIRUB SERPL-MCNC: 0.8 MG/DL
BUN SERPL-MCNC: 13 MG/DL
CALCIUM SERPL-MCNC: 10 MG/DL
CHLORIDE SERPL-SCNC: 107 MMOL/L
CHOLEST SERPL-MCNC: 200 MG/DL
CHOLEST/HDLC SERPL: 2.6 {RATIO}
CO2 SERPL-SCNC: 26 MMOL/L
CREAT SERPL-MCNC: 1 MG/DL
DIFFERENTIAL METHOD: ABNORMAL
EOSINOPHIL # BLD AUTO: 0.2 K/UL
EOSINOPHIL NFR BLD: 3 %
ERYTHROCYTE [DISTWIDTH] IN BLOOD BY AUTOMATED COUNT: 13.8 %
EST. GFR  (AFRICAN AMERICAN): >60 ML/MIN/1.73 M^2
EST. GFR  (NON AFRICAN AMERICAN): >60 ML/MIN/1.73 M^2
ESTIMATED AVG GLUCOSE: 103 MG/DL
GLUCOSE SERPL-MCNC: 87 MG/DL
HBA1C MFR BLD HPLC: 5.2 %
HCT VFR BLD AUTO: 33.8 %
HDLC SERPL-MCNC: 76 MG/DL
HDLC SERPL: 38 %
HGB BLD-MCNC: 11.2 G/DL
LDLC SERPL CALC-MCNC: 115.6 MG/DL
LYMPHOCYTES # BLD AUTO: 2 K/UL
LYMPHOCYTES NFR BLD: 31.6 %
MCH RBC QN AUTO: 28.1 PG
MCHC RBC AUTO-ENTMCNC: 33.1 G/DL
MCV RBC AUTO: 85 FL
MONOCYTES # BLD AUTO: 0.8 K/UL
MONOCYTES NFR BLD: 12.5 %
NEUTROPHILS # BLD AUTO: 3.4 K/UL
NEUTROPHILS NFR BLD: 52.3 %
NONHDLC SERPL-MCNC: 124 MG/DL
PLATELET # BLD AUTO: 251 K/UL
PMV BLD AUTO: 9.9 FL
POTASSIUM SERPL-SCNC: 4.4 MMOL/L
PROT SERPL-MCNC: 7.1 G/DL
RBC # BLD AUTO: 3.98 M/UL
SODIUM SERPL-SCNC: 142 MMOL/L
TRIGL SERPL-MCNC: 42 MG/DL
TSH SERPL DL<=0.005 MIU/L-ACNC: 2.12 UIU/ML
WBC # BLD AUTO: 6.42 K/UL

## 2018-02-10 PROCEDURE — 80053 COMPREHEN METABOLIC PANEL: CPT

## 2018-02-10 PROCEDURE — 36415 COLL VENOUS BLD VENIPUNCTURE: CPT

## 2018-02-10 PROCEDURE — 83036 HEMOGLOBIN GLYCOSYLATED A1C: CPT

## 2018-02-10 PROCEDURE — 82306 VITAMIN D 25 HYDROXY: CPT

## 2018-02-10 PROCEDURE — 80061 LIPID PANEL: CPT

## 2018-02-10 PROCEDURE — 85025 COMPLETE CBC W/AUTO DIFF WBC: CPT

## 2018-02-10 PROCEDURE — 84443 ASSAY THYROID STIM HORMONE: CPT

## 2018-06-07 ENCOUNTER — OFFICE VISIT (OUTPATIENT)
Dept: INTERNAL MEDICINE | Facility: CLINIC | Age: 58
End: 2018-06-07
Payer: COMMERCIAL

## 2018-06-07 VITALS
HEIGHT: 66 IN | HEART RATE: 67 BPM | WEIGHT: 215.81 LBS | DIASTOLIC BLOOD PRESSURE: 88 MMHG | BODY MASS INDEX: 34.68 KG/M2 | SYSTOLIC BLOOD PRESSURE: 122 MMHG | TEMPERATURE: 99 F

## 2018-06-07 DIAGNOSIS — A09 TRAVELER'S DIARRHEA: Primary | ICD-10-CM

## 2018-06-07 DIAGNOSIS — E86.0 DEHYDRATION: ICD-10-CM

## 2018-06-07 PROCEDURE — 99999 PR PBB SHADOW E&M-EST. PATIENT-LVL IV: CPT | Mod: PBBFAC,,, | Performed by: PHYSICIAN ASSISTANT

## 2018-06-07 PROCEDURE — 99214 OFFICE O/P EST MOD 30 MIN: CPT | Mod: S$GLB,,, | Performed by: PHYSICIAN ASSISTANT

## 2018-06-07 RX ORDER — SODIUM CHLORIDE 9 MG/ML
INJECTION, SOLUTION INTRAVENOUS
Status: COMPLETED | OUTPATIENT
Start: 2018-06-07 | End: 2018-06-07

## 2018-06-07 RX ORDER — AZITHROMYCIN 500 MG/1
1000 TABLET, FILM COATED ORAL DAILY
Qty: 2 TABLET | Refills: 1 | Status: SHIPPED | OUTPATIENT
Start: 2018-06-07 | End: 2019-01-30

## 2018-06-07 RX ADMIN — SODIUM CHLORIDE: 9 INJECTION, SOLUTION INTRAVENOUS at 10:06

## 2018-06-07 NOTE — PATIENT INSTRUCTIONS
Dehydration (Adult)  Dehydration occurs when your body loses too much fluid. This may be the result of prolonged vomiting or diarrhea, excessive sweating, or a high fever. It may also happen if you dont drink enough fluid when youre sick or out in the heat. Misuse of diuretics (water pills) can also be a cause.  Symptoms include thirst and decreased urine output. You may also feel dizzy, weak, fatigued, or very drowsy. The diet described below is usually enough to treat dehydration. In some cases, you may need medicine.  Home care  · Drink at least 12 8-ounce glasses of fluid every day to resolve the dehydration. Fluid may include water; orange juice; lemonade; apple, grape, or cranberry juice; clear fruit drinks; electrolyte replacement and sports drinks; and teas and coffee without caffeine. If you have been diagnosed with a kidney disease, ask your doctor how much and what types of fluids you should drink to prevent dehydration. If you have kidney disease, fluid can build up in the body. This can be dangerous to your health.  · If you have a fever, muscle aches, or a headache as a result of a cold or flu, you may take acetaminophen or ibuprofen, unless another medicine was prescribed. If you have chronic liver or kidney disease, or have ever had a stomach ulcer or gastrointestinal bleeding, talk with your health care provider before using these medicines. Don't take aspirin if you are younger than 18 and have a fever. Aspirin raises the chance for severe liver injury.  Follow-up care  Follow up with your health care provider, or as advised.  When to seek medical advice  Call your health care provider right away if any of these occur:  · Continued vomiting  · Frequent diarrhea (more than 5 times a day); blood (red or black color) or mucus in diarrhea  · Blood in vomit or stool  · Swollen abdomen or increasing abdominal pain  · Weakness, dizziness, or fainting  · Unusual drowsiness or confusion  · Reduced urine  output or extreme thirst  · Fever of 100.4°F (34°C) or higher  Date Last Reviewed: 5/31/2015  © 5589-4456 [email or web address]. [address] [place] [address], [place] [address]. All rights reserved. This information is not intended as a substitute for professional medical care. Always follow your healthcare professional's instructions.        Traveler's Diarrhea (Adult)    Traveler's diarrhea is an infection in the intestinal tract caused by bacteria called E coli. This bacteria is commonly found in water supplies of developing countries. The local people of those countries are used to E coli in the water and don't get sick. Tourists who drink contaminated water or eat foods that were washed or prepared with this water may become very ill.  The illness begins 1 to 3 days after exposure and lasts up to 5 days. Symptoms include fever, vomiting, stomach cramping and watery diarrhea. There may also be blood or mucus in the stool. Mild cases will get better without treatment. Antibiotics are used for more severe cases.  Home care  · If you were prescribed antibiotics,  take them until they are finished.  · You may use acetaminophen or ibuprofen to control fever, unless another medicine was prescribed. If you have chronic liver or kidney disease or have ever had a stomach ulcer or gastrointestinal  bleeding, talk with the doctor before using these medicines. Aspirin should never be used in anyone under 18 years of age who is ill with a fever. It may cause severe illness or death.  · Do not take over-the-counter anti-diarrheal medicines, unless advised by the doctor.  Once vomiting stops, follow these guidelines:  During the first 12 to 24 hours, follow the diet below:  · Fruit juices. Apple, grape and cranberry juice; clear fruit drinks, electrolyte replacement and sports drinks.  · Beverages. Soft drinks without caffeine; mineral water (plain or flavored); decaffeinated tea and coffee  · Soups. Clear broth, consommé and  bouillon.  · Desserts. Plain gelatin, popsicles and fruit juice bars; As you feel better, you may add 6 to 8 oz of yogurt per day.  During the next 24 hours, you may add the following to the above:  · Hot cereal, plain toast, bread, rolls, crackers  · Plain noodles, rice, mashed potatoes, chicken noodle or rice soup  · Unsweetened canned fruit (avoid pineapple), bananas  · Limit fat intake to less than 15 grams per day by avoiding margarine, butter, oils, mayonnaise, sauces, gravies, fried foods, peanut butter, meat, poultry and fish.  · Limit fiber; avoid raw or cooked vegetables, fresh fruits (except bananas) and bran cereals.  · Limit caffeine and chocolate. No spices or seasonings except salt.  During the next 24 hours you can gradually resume a normal diet as the symptoms lessen.  Follow-up care  Follow up with your healthcare provider, or as advised. Call if you are not improving within 24 hours or if the diarrhea lasts more than 1 week on antibiotics. If a stool (diarrhea) sample was taken, you may call in 2 days (or as directed) for the results.  When to seek medical advice  Call your healthcare provider if any of these occur:  · Severe constant pain in the lower part of the abdomen, on the right side  · Blood in diarrhea or vomit, dark coffee ground appearing vomit, or dark tarry stools  · continued vomiting (unable to keep liquids down)  · Frequent diarrhea (more than 5 times a day); blood (red or black) or mucus in diarrhea  · Reduced oral intake  · Reduced urine output or extreme thirst  · Weakness, dizziness, fainting  · Drowsiness, confusion, stiff neck, or seizure  · Fever (1 degree above your normal temperature) lasting 24 to 48 hours, or whatever your healthcare provider told you to report based on your condition  Date Last Reviewed: 11/16/2015  © 2825-0917 [employer]. [address] [place] [address], [place] [address]. All rights reserved. This information is not intended as a substitute for  professional medical care. Always follow your healthcare professional's instructions.

## 2018-06-07 NOTE — PROGRESS NOTES
2 patient identifiers verified (Name and )    Fall Risk Band applied    Patient instructed not to go to restroom or stand alone.  Patient verbalized understanding.    Symptoms: diarrhea for 1 week, abdominal pain    Number of attempts: 1    Size of cath used: 22G     Location: R AC    Fluid ordered: NS 0.9%      Initial Vitals:   BP: 131/78   Pulse:60   O2: 98   Temp: 98.5

## 2018-06-07 NOTE — PROGRESS NOTES
Subjective:       Patient ID: Albertina Sim is a 58 y.o. female.        Chief Complaint: Diarrhea (x1week) and Abdominal Pain    Albertina Sim is an established patient of Lindsay Lake MD here today for urgent care visit.      Returned from [place] 6/1.  She notes she ate dinner on 5/31 and several members of her group got sick during dinner.  On 6/1, she had diarrhea after eating an omelet.  She then didn't eat anything else the rest of the day.  The next day, she tried to eat, again had diarrhea.  This happened again the next day.  She began pushing fluids and eating pretty much just crackers.  Yesterday evening, she tried to eat chicken noodle soup.  A couple hours after she had diarrhea again.  She feels some pressure and cramping in the lower abdomen.  No vomiting.  No fever.  No blood or mucus in the stool.  She is still urinating fine.  Each time she has 3-4 episodes of diarrhea.             Review of Systems   Constitutional: Negative for chills, diaphoresis, fatigue and fever.   HENT: Negative for congestion and sore throat.    Eyes: Negative for visual disturbance.   Respiratory: Negative for cough, chest tightness and shortness of breath.    Cardiovascular: Negative for chest pain, palpitations and leg swelling.   Gastrointestinal: Positive for abdominal pain, diarrhea and vomiting. Negative for blood in stool, constipation and nausea.   Genitourinary: Negative for dysuria, frequency, hematuria and urgency.   Musculoskeletal: Negative for arthralgias and back pain.   Skin: Negative for rash.   Neurological: Negative for dizziness, syncope, weakness and headaches.   Psychiatric/Behavioral: Negative for dysphoric mood and sleep disturbance. The patient is not nervous/anxious.        Objective:      Physical Exam   Constitutional: She appears well-developed and well-nourished. No distress.   HENT:   Head: Normocephalic and atraumatic.   Right Ear: Tympanic membrane and external ear normal.   Left Ear:  "Tympanic membrane and external ear normal.   Nose: Nose normal.   Mouth/Throat: Oropharynx is clear and moist.   Eyes: Conjunctivae are normal. Pupils are equal, round, and reactive to light.   Cardiovascular: Normal rate, regular rhythm and normal heart sounds.  Exam reveals no gallop.    No murmur heard.  Pulmonary/Chest: Effort normal and breath sounds normal. No respiratory distress.   Abdominal: Soft. Normal appearance. There is no tenderness. There is no rebound, no guarding and no CVA tenderness.   Musculoskeletal: She exhibits no edema.   Neurological: She is alert.   Skin: Skin is warm and dry. She is not diaphoretic.   Psychiatric: She has a normal mood and affect.   Nursing note and vitals reviewed.      Assessment:       1. Traveler's diarrhea    2. Dehydration        Plan:       Albertina was seen today for diarrhea and abdominal pain.    Diagnoses and all orders for this visit:    Traveler's diarrhea  -     0.9%  NaCl infusion; Inject into the vein one time.  -     Giardia / Cryptosporidum, EIA; Future  -     Clostridium difficile EIA; Future  -     Stool culture; Future  -     Stool Exam-Ova,Cysts,Parasites; Future        -     azithromycin (ZITHROMAX) 500 MG tablet; Take 2 tablets (1,000 mg total) by mouth once daily.    Dehydration  -     0.9%  NaCl infusion; Inject into the vein one time.    Drink plenty of fluids, get lots of rest, and follow-up poor results.      Pt has been given instructions populated from [employer] database and has verbalized understanding of the after visit summary and information contained wherein.    Follow up with a primary care provider. May go to ER for acute shortness of breath, lightheadedness, fever, or any other emergent complaints or changes in condition.    "This note will be shared with the patient"    No future appointments.            "

## 2018-06-07 NOTE — PROGRESS NOTES
Amount of fluid given: 1000 ML NS    Final Vitals:   BP: 128/58   Pulse:58   O2: 98   Temp:98.5      Catheter removed, tip intact.      Patient had no complaints of pain or discomfort       Site without signs and symptoms of complications. Dressing and pressure applied.

## 2018-06-08 ENCOUNTER — LAB VISIT (OUTPATIENT)
Dept: LAB | Facility: HOSPITAL | Age: 58
End: 2018-06-08
Attending: PHYSICIAN ASSISTANT
Payer: COMMERCIAL

## 2018-06-08 DIAGNOSIS — A09 TRAVELER'S DIARRHEA: ICD-10-CM

## 2018-06-08 LAB
C DIFF GDH STL QL: NEGATIVE
C DIFF TOX A+B STL QL IA: NEGATIVE

## 2018-06-08 PROCEDURE — 87046 STOOL CULTR AEROBIC BACT EA: CPT | Mod: 59

## 2018-06-08 PROCEDURE — 87449 NOS EACH ORGANISM AG IA: CPT

## 2018-06-08 PROCEDURE — 87209 SMEAR COMPLEX STAIN: CPT

## 2018-06-08 PROCEDURE — 87329 GIARDIA AG IA: CPT

## 2018-06-08 PROCEDURE — 87045 FECES CULTURE AEROBIC BACT: CPT

## 2018-06-08 PROCEDURE — 87427 SHIGA-LIKE TOXIN AG IA: CPT

## 2018-06-09 LAB
CRYPTOSP AG STL QL IA: NEGATIVE
G LAMBLIA AG STL QL IA: NEGATIVE

## 2018-06-11 ENCOUNTER — TELEPHONE (OUTPATIENT)
Dept: INTERNAL MEDICINE | Facility: CLINIC | Age: 58
End: 2018-06-11

## 2018-06-11 LAB
BACTERIA STL CULT: NORMAL
E COLI SXT1 STL QL IA: NEGATIVE
E COLI SXT2 STL QL IA: NEGATIVE
O+P STL TRI STN: NORMAL

## 2018-06-11 NOTE — TELEPHONE ENCOUNTER
Please call to check on patient and see how she is doing.  Her stool sample is not complete but preliminarily growing out a parasite.  If she is no longer having symptoms, we don't have to treat but if she is, we may need to send in a different medication.  Please let me know how she is doing and we can proceed from there.

## 2018-06-12 NOTE — TELEPHONE ENCOUNTER
Called and spoke with patient.  She is feeling fine.  No further diarrhea or abdominal pain.  She had a formed bowel movement this morning.  Discussed the stool results.  Given that she is now asx, no need to treat.  Discussed with Dr. Cohen as well.

## 2019-01-29 PROBLEM — Z37.9 TWIN BIRTH: Status: ACTIVE | Noted: 2019-01-29

## 2019-01-30 ENCOUNTER — HOSPITAL ENCOUNTER (OUTPATIENT)
Dept: RADIOLOGY | Facility: HOSPITAL | Age: 59
Discharge: HOME | End: 2019-01-30
Attending: INTERNAL MEDICINE
Payer: COMMERCIAL

## 2019-01-30 ENCOUNTER — OFFICE VISIT (OUTPATIENT)
Dept: INTERNAL MEDICINE | Facility: CLINIC | Age: 59
End: 2019-01-30
Payer: COMMERCIAL

## 2019-01-30 ENCOUNTER — CLINICAL SUPPORT (OUTPATIENT)
Dept: INTERNAL MEDICINE | Facility: CLINIC | Age: 59
End: 2019-01-30
Payer: COMMERCIAL

## 2019-01-30 VITALS
DIASTOLIC BLOOD PRESSURE: 85 MMHG | HEART RATE: 64 BPM | SYSTOLIC BLOOD PRESSURE: 128 MMHG | BODY MASS INDEX: 36.42 KG/M2 | OXYGEN SATURATION: 99 % | HEIGHT: 66 IN | WEIGHT: 226.63 LBS

## 2019-01-30 DIAGNOSIS — Z12.31 ENCOUNTER FOR SCREENING MAMMOGRAM FOR BREAST CANCER: ICD-10-CM

## 2019-01-30 DIAGNOSIS — G47.62 NOCTURNAL LEG CRAMPS: ICD-10-CM

## 2019-01-30 DIAGNOSIS — Z11.59 SCREENING FOR VIRAL DISEASE: ICD-10-CM

## 2019-01-30 DIAGNOSIS — E55.9 VITAMIN D DEFICIENCY: ICD-10-CM

## 2019-01-30 DIAGNOSIS — Z00.00 ANNUAL PHYSICAL EXAM: Primary | ICD-10-CM

## 2019-01-30 DIAGNOSIS — D64.9 ANEMIA, UNSPECIFIED TYPE: ICD-10-CM

## 2019-01-30 DIAGNOSIS — D57.3 SICKLE CELL TRAIT: ICD-10-CM

## 2019-01-30 DIAGNOSIS — M17.11 PRIMARY OSTEOARTHRITIS OF RIGHT KNEE: ICD-10-CM

## 2019-01-30 DIAGNOSIS — M25.562 CHRONIC PAIN OF LEFT KNEE: ICD-10-CM

## 2019-01-30 DIAGNOSIS — R03.0 ELEVATED BP WITHOUT DIAGNOSIS OF HYPERTENSION: ICD-10-CM

## 2019-01-30 DIAGNOSIS — Z37.9 TWIN BIRTH: ICD-10-CM

## 2019-01-30 DIAGNOSIS — G89.29 CHRONIC PAIN OF LEFT KNEE: ICD-10-CM

## 2019-01-30 DIAGNOSIS — E66.01 MORBID OBESITY: ICD-10-CM

## 2019-01-30 PROCEDURE — 77063 MAMMO DIGITAL SCREENING BILAT WITH TOMOSYNTHESIS_CAD: ICD-10-PCS | Mod: 26,,, | Performed by: RADIOLOGY

## 2019-01-30 PROCEDURE — 77067 SCR MAMMO BI INCL CAD: CPT | Mod: TC

## 2019-01-30 PROCEDURE — 90472 PNEUMOCOCCAL POLYSACCHARIDE VACCINE 23-VALENT =>2YO SQ IM: ICD-10-PCS | Mod: S$GLB,,, | Performed by: INTERNAL MEDICINE

## 2019-01-30 PROCEDURE — 90472 IMMUNIZATION ADMIN EACH ADD: CPT | Mod: S$GLB,,, | Performed by: INTERNAL MEDICINE

## 2019-01-30 PROCEDURE — 90732 PNEUMOCOCCAL POLYSACCHARIDE VACCINE 23-VALENT =>2YO SQ IM: ICD-10-PCS | Mod: S$GLB,,, | Performed by: INTERNAL MEDICINE

## 2019-01-30 PROCEDURE — 99999 PR PBB SHADOW E&M-EST. PATIENT-LVL I: ICD-10-PCS | Mod: PBBFAC,,,

## 2019-01-30 PROCEDURE — 99396 PR PREVENTIVE VISIT,EST,40-64: ICD-10-PCS | Mod: 25,S$GLB,, | Performed by: INTERNAL MEDICINE

## 2019-01-30 PROCEDURE — 99396 PREV VISIT EST AGE 40-64: CPT | Mod: 25,S$GLB,, | Performed by: INTERNAL MEDICINE

## 2019-01-30 PROCEDURE — 77063 BREAST TOMOSYNTHESIS BI: CPT | Mod: 26,,, | Performed by: RADIOLOGY

## 2019-01-30 PROCEDURE — 99999 PR PBB SHADOW E&M-EST. PATIENT-LVL I: CPT | Mod: PBBFAC,,,

## 2019-01-30 PROCEDURE — 90732 PPSV23 VACC 2 YRS+ SUBQ/IM: CPT | Mod: S$GLB,,, | Performed by: INTERNAL MEDICINE

## 2019-01-30 PROCEDURE — 99999 PR PBB SHADOW E&M-EST. PATIENT-LVL IV: CPT | Mod: PBBFAC,,, | Performed by: INTERNAL MEDICINE

## 2019-01-30 PROCEDURE — 90471 IMMUNIZATION ADMIN: CPT | Mod: S$GLB,,, | Performed by: INTERNAL MEDICINE

## 2019-01-30 PROCEDURE — 77067 MAMMO DIGITAL SCREENING BILAT WITH TOMOSYNTHESIS_CAD: ICD-10-PCS | Mod: 26,,, | Performed by: RADIOLOGY

## 2019-01-30 PROCEDURE — 90471 TDAP VACCINE GREATER THAN OR EQUAL TO 7YO IM: ICD-10-PCS | Mod: S$GLB,,, | Performed by: INTERNAL MEDICINE

## 2019-01-30 PROCEDURE — 90715 TDAP VACCINE GREATER THAN OR EQUAL TO 7YO IM: ICD-10-PCS | Mod: S$GLB,,, | Performed by: INTERNAL MEDICINE

## 2019-01-30 PROCEDURE — 90715 TDAP VACCINE 7 YRS/> IM: CPT | Mod: S$GLB,,, | Performed by: INTERNAL MEDICINE

## 2019-01-30 PROCEDURE — 77067 SCR MAMMO BI INCL CAD: CPT | Mod: 26,,, | Performed by: RADIOLOGY

## 2019-01-30 PROCEDURE — 99999 PR PBB SHADOW E&M-EST. PATIENT-LVL IV: ICD-10-PCS | Mod: PBBFAC,,, | Performed by: INTERNAL MEDICINE

## 2019-01-30 RX ORDER — ACETAMINOPHEN 500 MG
3 TABLET ORAL DAILY
COMMUNITY
End: 2019-01-31 | Stop reason: SDUPTHER

## 2019-01-30 RX ORDER — MULTIVITAMIN
1 TABLET ORAL DAILY
COMMUNITY

## 2019-01-30 NOTE — PATIENT INSTRUCTIONS
Goal BP is 130 or less on top and 80 or less on the bottom  Eating Heart-Healthy Food: Using the DASH Plan    Hylands leg cramps    Eating for your heart doesnt have to be hard or boring. You just need to know how to make healthier choices. The DASH eating plan has been developed to help you do just that. DASH stands for Dietary Approaches to Stop Hypertension. It is a plan that has been proven to be healthier for your heart and to lower your risk for high blood pressure. It can also help lower your risk for cancer, heart disease, osteoporosis, and diabetes.  Choosing from each food group  Choose foods from each of the food groups below each day. Try to get the recommended number of servings for each food group. The serving numbers are based on a diet of 2,000 calories a day. Talk to your doctor if youre unsure about your calorie needs. Along with getting the correct servings, the DASH plan also recommends a sodium intake less than 2,300 mg per day.        Grains  Servings: 6 to 8 a day  A serving is:  · 1 slice bread  · 1 ounce dry cereal  · Half a cup cooked rice, pasta or cereal  Best choices: Whole grains and any grains high in fiber. Vegetables  Servings: 4 to 5 a day  A serving is:  · 1 cup raw leafy vegetable  · Half a cup cut-up raw or cooked vegetable  · Half a cup vegetable juice  Best choices: Fresh or frozen vegetables prepared without added salt or fat.   Fruits  Servings: 4 to 5 a day  A serving is:  · 1 medium fruit  · One-quarter cup dried fruit  · Half a cup fresh, frozen, or canned fruit  · Half a cup of 100% fruit juices  Best choices: A variety of fresh fruits of different colors. Whole fruits are a better choice than fruit juices. Low-fat or fat-free dairy  Servings: 2 to 3 a day  A serving is:  · 1 cup milk  · 1 cup yogurt  · One and a half ounces cheese  Best choices: Skim or 1% milk, low-fat or fat-free yogurt or buttermilk, and low-fat cheeses.         Lean meats, poultry,  fish  Servings: 6 or fewer a day  A serving is:  · 1 ounce cooked meats, poultry, or fish  · 1 egg  Best choices: Lean poultry and fish. Trim away visible fat. Broil, grill, roast, or boil instead of frying. Remove skin from poultry before eating. Limit how much red meat you eat.  Nuts, seeds, beans  Servings: 4 to 5 a week  A serving is:  · One-third cup nuts (one and a half ounces)  · 2 tablespoons nut butter or seeds  · Half a cup cooked dry beans or legumes  Best choices: Dry roasted nuts with no salt added, lentils, kidney beans, garbanzo beans, and whole brito beans.   Fats and oils  Servings: 2 to 3 a day  A serving is:  · 1 teaspoon vegetable oil  · 1 teaspoon soft margarine  · 1 tablespoon mayonnaise  · 2 tablespoons salad dressing  Best choices: Nut and vegetable oils (nontropical vegetable oils), such as olive and canola oil. Sweets  Servings: 5 a week or fewer  A serving is:  · 1 tablespoon sugar, maple syrup, or honey  · 1 tablespoon jam or jelly  · 1 half-ounce jelly beans (about 15)  · 1 cup lemonade  Best choices: Dried fruit can be a satisfying sweet. Choose low-fat sweets. And watch your serving sizes!      For more on the DASH eating plan, visit:  www.nhlbi.nih.gov/health/health-topics/topics/dash   Date Last Reviewed: 6/1/2016 © 2000-2017 [employer] [address] [place] [address]. All rights reserved. This information is not intended as a substitute for professional medical care. Always follow your healthcare professional's instructions.        Low-Salt Choices  Eating salt (sodium) can make your body retain too much water. Excess water makes your heart work harder. Canned, packaged, and frozen foods are easy to prepare, but they are often high in sodium. Here are some ideas for low-salt foods you can easily prepare yourself.    For breakfast  · Fruit or 100% fruit juice  · Whole-wheat bread or an English muffin. Compare sodium content on labels.  · Low-fat milk or  yogurt  · Unsalted eggs  · Shredded wheat  · Corn tortillas  · Unsalted steamed rice  · Regular (not instant) hot cereal, made without salt  Stay away from:  · Sausage, de leon, and ham  · Flour tortillas  · Packaged muffins, pancakes, and biscuits  · Instant hot cereals  · Cottage cheese  For lunch and dinner  · Fresh fish, chicken, turkey, or meat--baked, broiled, or roasted without salt  · Dry beans, cooked without salt  · Tofu, stir-fried without salt  · Unsalted fresh fruit and vegetables, or frozen or canned fruit and vegetables with no added salt  Stay away from:  · Lunch or deli meat that is cured or smoked  · Cheese  · Tomato juice and catsup  · Canned vegetables, soups, and fish not labeled as no-salt-added or reduced sodium  · Packaged gravies and sauces  · Olives, pickles, and relish  · Bottled salad dressings  For snacks and desserts  · Yogurt  · Unsalted, air popped popcorn  · Unsalted nuts or seeds  Stay away from:  · Pies and cakes  · Packaged dessert mixes  · Pizza  · Canned and packaged puddings  · Pretzels, chips, crackers, and nuts--unless the label says unsalted  Date Last Reviewed: 6/17/2015  © 9296-0210 [employer]. [address] [place] [address]. All rights reserved. This information is not intended as a substitute for professional medical care. Always follow your healthcare professional's instructions.        Tips for Using Less Salt    Most people with heart problems need to eat less salt (sodium). Reducing the amount of salt you eat may help control your blood pressure. The higher your blood pressure, the greater your risk for heart disease, stroke, blindness, and kidney problems.  At the store  · Make low-salt choices by reading labels carefully. Look for the total amount of sodium per serving.  · Use more fresh food. Buy more fruits and vegetables. Select lean meats, fish, and poultry.  · Use fewer frozen, canned, and packaged foods which often contain a lot of  sodium.  · Use plain frozen vegetables without sauces or toppings. These products are often low- or no-sodium.  · Opt for reduced-sodium or no-salt-added versions of canned vegetables and soups.  In the kitchen  · Don't add salt to food when you're cooking. Season with flavorings such as onion, garlic, pepper, salt-free herbal blends, and lemon or lime juice.  · Use a cookbook containing low-salt recipes. It can give you ideas for tasty meals that are healthy for your heart.  · Sprinkle salt-free herbal blends on vegetables and meat.  · Drain and rinse canned foods, such as canned beans and vegetables, before cooking or eating.  Eating out  · Tell the  you're on a low-salt diet. Ask questions about the menu.  · Order fish, chicken, and meat broiled, baked, poached, or grilled without salt, butter, or breading.  · Use lemon, pepper, and salt-free herb mixes to add flavor.  · Choose plain steamed rice, boiled noodles, and baked or boiled potatoes. Top potatoes with chives and a little sour cream.     Beware! Salt goes by many other names. Limit foods with these words listed as ingredients: salt, sodium, soy sauce, baking soda, baking powder, MSG, monosodium, Na (the chemical symbol for sodium). Some antacids are also high in salt.   Date Last Reviewed: 6/19/2015  © 6241-6900 [employer]. [address] [place] [address]. All rights reserved. This information is not intended as a substitute for professional medical care. Always follow your healthcare professional's instructions.      The 10-year CVD risk score (D'Agostino, et al., 2008) is: 5.6%    Values used to calculate the score:      Age: 59 years      Sex: Female      Diabetic: No      Tobacco smoker: No      Systolic Blood Pressure: 128 mmHg      Is BP treated: No      HDL Cholesterol: 76 mg/dL      Total Cholesterol: 200 mg/dL

## 2019-01-31 ENCOUNTER — PATIENT MESSAGE (OUTPATIENT)
Dept: INTERNAL MEDICINE | Facility: CLINIC | Age: 59
End: 2019-01-31

## 2019-01-31 RX ORDER — ACETAMINOPHEN 500 MG
1 TABLET ORAL DAILY
COMMUNITY
Start: 2019-01-31

## 2019-01-31 NOTE — TELEPHONE ENCOUNTER
Sent: 1/31/2019  8:41 AM CST       To: Lindsay Lake MD  Subject: Test Results    Good Morning,    Should I continue taking the Vitamin D3?  Also, it there anything I need to take regarding the Hemoglobin?    Thank you,    Albertina      Please advise  Thank you

## 2019-02-01 NOTE — PROGRESS NOTES
Subjective:       Patient ID: Albertina Sim is a 59 y.o. female.    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   HENT: Negative for hearing loss.    Eyes: Negative for visual disturbance.   Respiratory: Negative for cough, chest tightness, shortness of breath and wheezing.    Cardiovascular: Negative for chest pain, palpitations and leg swelling.   Gastrointestinal: Negative for abdominal pain, constipation, nausea and vomiting.   Genitourinary: Negative for dysuria, frequency and urgency.   Musculoskeletal: Negative for arthralgias, back pain, gait problem, joint swelling and myalgias.   Skin: Negative for rash.   Neurological: Negative for light-headedness and headaches.   Psychiatric/Behavioral: Negative for dysphoric mood and sleep disturbance. The patient is not nervous/anxious.        Objective:      Physical Exam   Constitutional: She is oriented to person, place, and time. She appears well-developed and well-nourished. No distress.   HENT:   Head: Normocephalic and atraumatic.   Right Ear: External ear normal.   Left Ear: External ear normal.   Nose: Nose normal.   Mouth/Throat: Oropharynx is clear and moist. No oropharyngeal exudate.   Eyes: Conjunctivae and EOM are normal. Pupils are equal, round, and reactive to light. Right eye exhibits no discharge. No scleral icterus.   Neck: Normal range of motion and full passive range of motion without pain. Neck supple. No spinous process tenderness and no muscular tenderness present. Carotid bruit is not present. No thyromegaly present.   Cardiovascular: Normal rate, regular rhythm, S1 normal, S2 normal, normal  heart sounds and intact distal pulses. Exam reveals no gallop and no friction rub.   No murmur heard.  Pulmonary/Chest: Effort normal and breath sounds normal. No respiratory distress. She has no wheezes. She has no rales. She exhibits no tenderness.   Abdominal: Soft. Bowel sounds are normal. She exhibits no distension and no mass. There is no tenderness. There is no rebound and no guarding.   Genitourinary: Pelvic exam was performed with patient supine. Uterus is not deviated, not enlarged, not fixed and not tender. Cervix exhibits no motion tenderness, no discharge and no friability. Right adnexum displays no mass, no tenderness and no fullness. Left adnexum displays no mass, no tenderness and no fullness.   Musculoskeletal: Normal range of motion. She exhibits no edema or tenderness.   Lymphadenopathy:        Head (right side): No submental and no submandibular adenopathy present.        Head (left side): No submental and no submandibular adenopathy present.     She has no cervical adenopathy.        Right cervical: No superficial cervical, no deep cervical and no posterior cervical adenopathy present.       Left cervical: No superficial cervical, no deep cervical and no posterior cervical adenopathy present.        Right axillary: No pectoral and no lateral adenopathy present.        Left axillary: No pectoral and no lateral adenopathy present.       Right: No supraclavicular adenopathy present.        Left: No supraclavicular adenopathy present.   Neurological: She is alert and oriented to person, place, and time. She has normal reflexes. No cranial nerve deficit. She exhibits normal muscle tone. Coordination normal.   Skin: Skin is warm and dry. No rash noted.   Psychiatric: She has a normal mood and affect. Her behavior is normal. Her mood appears not anxious. Her speech is not rapid and/or pressured. She is not agitated. She does not exhibit a depressed mood.   Normal behavior, thought content, insight and  judgement.       Assessment:       1. Annual physical exam    2. Twin siblings, she is not a twin   3. Elevated BP without diagnosis of hypertension    4. Anemia, unspecified type    5. BMI 35.0-35.9,adult    6. Chronic pain of left knee    7. Primary osteoarthritis of right knee    8. Sickle cell trait    9. Morbid obesity    10. Vitamin D deficiency    11. Nocturnal leg cramps    12. Screening for viral disease    13. Encounter for screening mammogram for breast cancer        Plan:   Albertina was seen today for annual exam.    Diagnoses and all orders for this visit:    Annual physical exam    Elevated BP without diagnosis of hypertension, try to monitor twice a week    Anemia, unspecified type, this is related to sickle trait    BMI 35.0-35.9,adult, it sounds like she started a really good program     Chronic pain of left knee a she is managing well.    Primary osteoarthritis of right knee    Sickle cell trait  -     CBC auto differential; Future    Vitamin D deficiency  -     Vitamin D; Future    Nocturnal leg cramps  -     Magnesium; Future    Screening for viral disease, would probably benefit from a travel consult before going to [facility]  -     Hepatitis A antibody, IgG; Future  -     Hepatitis B Surface Antibody, Qual/Quant; Future    Encounter for screening mammogram for breast cancer  -     Mammo Digital Screening Bilat w/ Zafar; Future  Follow-up in about 6 months (around 7/30/2019) for also one year for physical.  The 10-year CVD risk score (JIN'Agoino, et al., 2008) is: 5.6%    Values used to calculate the score:      Age: 59 years      Sex: Female      Diabetic: No      Tobacco smoker: No      Systolic Blood Pressure: 128 mmHg      Is BP treated: No      HDL Cholesterol: 76 mg/dL      Total Cholesterol: 200 mg/dL

## 2019-02-27 ENCOUNTER — TELEPHONE (OUTPATIENT)
Dept: INTERNAL MEDICINE | Facility: CLINIC | Age: 59
End: 2019-02-27

## 2019-02-27 DIAGNOSIS — Z23 NEED FOR HEPATITIS A AND B VACCINATION: Primary | ICD-10-CM

## 2019-02-27 NOTE — TELEPHONE ENCOUNTER
Orders for the hepatitis a hepatitis-B vaccine and ambulatory referral to the Infectious Disease injection room orders are in patient will need to schedule

## 2019-02-27 NOTE — TELEPHONE ENCOUNTER
----- Message from Verito Blum sent at 2/27/2019  4:50 PM CST -----  Patient would like to get the Hep A/B series that you recommended.  Could you please enter the order for the patient to have done in Infectious Disease.  Please contact to schedule once in.  Thanks

## 2019-02-28 NOTE — TELEPHONE ENCOUNTER
Spoke to patient. Patient was informed that orders for Hep A and B were placed to set up with infectious disease. Patient was given dept ph number to schedule.    Pt verbalized understanding.

## 2019-03-07 ENCOUNTER — CLINICAL SUPPORT (OUTPATIENT)
Dept: INFECTIOUS DISEASES | Facility: CLINIC | Age: 59
End: 2019-03-07
Payer: COMMERCIAL

## 2019-03-07 PROCEDURE — 90636 HEPATITIS A HEPATITIS B COMBINED VACCINE IM: ICD-10-PCS | Mod: S$GLB,,, | Performed by: INTERNAL MEDICINE

## 2019-03-07 PROCEDURE — 90471 HEPATITIS A HEPATITIS B COMBINED VACCINE IM: ICD-10-PCS | Mod: S$GLB,,, | Performed by: INTERNAL MEDICINE

## 2019-03-07 PROCEDURE — 90471 IMMUNIZATION ADMIN: CPT | Mod: S$GLB,,, | Performed by: INTERNAL MEDICINE

## 2019-03-07 PROCEDURE — 90636 HEP A/HEP B VACC ADULT IM: CPT | Mod: S$GLB,,, | Performed by: INTERNAL MEDICINE

## 2019-04-01 ENCOUNTER — TELEPHONE (OUTPATIENT)
Dept: INTERNAL MEDICINE | Facility: CLINIC | Age: 59
End: 2019-04-01

## 2019-04-02 NOTE — TELEPHONE ENCOUNTER
----- Message from Beverly Reyna LPN sent at 4/1/2019  1:39 PM CDT -----  Good evening. Your patient listed above will be coming in on next week to receive the Twinrix vaccine. She is due for 2 more to complete the series. You placed the order for 1 out of the three. Can you add 2 more orders ?     Thanks!   Beverly Reyna LPN

## 2019-04-08 ENCOUNTER — CLINICAL SUPPORT (OUTPATIENT)
Dept: INFECTIOUS DISEASES | Facility: CLINIC | Age: 59
End: 2019-04-08
Payer: COMMERCIAL

## 2019-04-08 PROCEDURE — 90471 HEPATITIS A HEPATITIS B COMBINED VACCINE IM: ICD-10-PCS | Mod: S$GLB,,, | Performed by: INTERNAL MEDICINE

## 2019-04-08 PROCEDURE — 90636 HEP A/HEP B VACC ADULT IM: CPT | Mod: S$GLB,,, | Performed by: INTERNAL MEDICINE

## 2019-04-08 PROCEDURE — 90471 IMMUNIZATION ADMIN: CPT | Mod: S$GLB,,, | Performed by: INTERNAL MEDICINE

## 2019-04-08 PROCEDURE — 90636 HEPATITIS A HEPATITIS B COMBINED VACCINE IM: ICD-10-PCS | Mod: S$GLB,,, | Performed by: INTERNAL MEDICINE

## 2019-06-27 ENCOUNTER — TELEPHONE (OUTPATIENT)
Dept: INFECTIOUS DISEASES | Facility: CLINIC | Age: 59
End: 2019-06-27

## 2019-06-27 NOTE — TELEPHONE ENCOUNTER
Returned called to Ms. Sim regarding changing appointment to July 7, 2019 that's too early to rescheduled appointment, so appointment  remains on 09/09/2019 but no answer voicemail left with contact information.

## 2019-07-29 ENCOUNTER — TELEPHONE (OUTPATIENT)
Dept: INTERNAL MEDICINE | Facility: CLINIC | Age: 59
End: 2019-07-29

## 2019-07-29 DIAGNOSIS — Z23 NEED FOR PROPHYLACTIC VACCINATION WITH COMBINED VACCINE: Primary | ICD-10-CM

## 2019-07-29 NOTE — TELEPHONE ENCOUNTER
\  Contact: Patient [phone]  Patient is leaving to go out of the country on Friday (8-2-19). Patient was informed by infectious disease that she would need to speak with PCP about getting the shots sooner than scheduled. . Last in the series of three (going to [place].).    Patient is requesting a call back    Please call and advise  Thank you

## 2019-07-29 NOTE — TELEPHONE ENCOUNTER
She received the hep A/B vaccine on 3/7/19 and 19.  US labeling below says next one is due 19.  Or on an accelerated schedule it could be given now, with a 4th one(booster) done 3/7/20      US labelin mL given on a 0-, 1-, and 6-month schedule for a total of 3 doses  Accelerated regimen: 1 mL on day 0, day 7, and days 21 to 30, followed by a booster at 12 months for a total of 4 doses    I spoke with pt and put in an order

## 2019-07-30 ENCOUNTER — CLINICAL SUPPORT (OUTPATIENT)
Dept: INFECTIOUS DISEASES | Facility: CLINIC | Age: 59
End: 2019-07-30
Payer: COMMERCIAL

## 2019-07-30 PROCEDURE — 90636 HEP A/HEP B VACC ADULT IM: CPT | Mod: S$GLB,,, | Performed by: INTERNAL MEDICINE

## 2019-07-30 PROCEDURE — 90636 HEPATITIS A HEPATITIS B COMBINED VACCINE IM: ICD-10-PCS | Mod: S$GLB,,, | Performed by: INTERNAL MEDICINE

## 2019-07-30 PROCEDURE — 90471 IMMUNIZATION ADMIN: CPT | Mod: S$GLB,,, | Performed by: INTERNAL MEDICINE

## 2019-07-30 PROCEDURE — 90471 HEPATITIS A HEPATITIS B COMBINED VACCINE IM: ICD-10-PCS | Mod: S$GLB,,, | Performed by: INTERNAL MEDICINE

## 2020-11-11 ENCOUNTER — PATIENT MESSAGE (OUTPATIENT)
Dept: PHARMACY | Facility: CLINIC | Age: 60
End: 2020-11-11

## 2020-11-11 ENCOUNTER — IMMUNIZATION (OUTPATIENT)
Dept: PHARMACY | Facility: CLINIC | Age: 60
End: 2020-11-11
Payer: COMMERCIAL

## 2020-11-11 ENCOUNTER — OFFICE VISIT (OUTPATIENT)
Dept: INTERNAL MEDICINE | Facility: CLINIC | Age: 60
End: 2020-11-11
Payer: COMMERCIAL

## 2020-11-11 VITALS
WEIGHT: 238.56 LBS | HEIGHT: 67 IN | SYSTOLIC BLOOD PRESSURE: 124 MMHG | DIASTOLIC BLOOD PRESSURE: 72 MMHG | BODY MASS INDEX: 37.44 KG/M2 | OXYGEN SATURATION: 98 % | HEART RATE: 72 BPM

## 2020-11-11 DIAGNOSIS — Z12.31 BREAST CANCER SCREENING BY MAMMOGRAM: ICD-10-CM

## 2020-11-11 DIAGNOSIS — Z12.11 SCREEN FOR COLON CANCER: ICD-10-CM

## 2020-11-11 DIAGNOSIS — M17.11 PRIMARY OSTEOARTHRITIS OF RIGHT KNEE: ICD-10-CM

## 2020-11-11 DIAGNOSIS — Z00.00 ANNUAL PHYSICAL EXAM: Primary | ICD-10-CM

## 2020-11-11 DIAGNOSIS — D57.3 SICKLE CELL TRAIT: ICD-10-CM

## 2020-11-11 DIAGNOSIS — Z23 NEED FOR 23-POLYVALENT PNEUMOCOCCAL POLYSACCHARIDE VACCINE: ICD-10-CM

## 2020-11-11 DIAGNOSIS — Z23 NEED FOR SHINGLES VACCINE: ICD-10-CM

## 2020-11-11 DIAGNOSIS — Z01.419 ROUTINE GYNECOLOGICAL EXAMINATION: ICD-10-CM

## 2020-11-11 PROBLEM — R03.0 ELEVATED BP WITHOUT DIAGNOSIS OF HYPERTENSION: Status: RESOLVED | Noted: 2019-01-30 | Resolved: 2020-11-11

## 2020-11-11 PROCEDURE — 1126F AMNT PAIN NOTED NONE PRSNT: CPT | Mod: S$GLB,,, | Performed by: INTERNAL MEDICINE

## 2020-11-11 PROCEDURE — 99999 PR PBB SHADOW E&M-EST. PATIENT-LVL IV: CPT | Mod: PBBFAC,,, | Performed by: INTERNAL MEDICINE

## 2020-11-11 PROCEDURE — 3008F PR BODY MASS INDEX (BMI) DOCUMENTED: ICD-10-PCS | Mod: CPTII,S$GLB,, | Performed by: INTERNAL MEDICINE

## 2020-11-11 PROCEDURE — 99396 PR PREVENTIVE VISIT,EST,40-64: ICD-10-PCS | Mod: S$GLB,,, | Performed by: INTERNAL MEDICINE

## 2020-11-11 PROCEDURE — 99396 PREV VISIT EST AGE 40-64: CPT | Mod: S$GLB,,, | Performed by: INTERNAL MEDICINE

## 2020-11-11 PROCEDURE — 3008F BODY MASS INDEX DOCD: CPT | Mod: CPTII,S$GLB,, | Performed by: INTERNAL MEDICINE

## 2020-11-11 PROCEDURE — 1126F PR PAIN SEVERITY QUANTIFIED, NO PAIN PRESENT: ICD-10-PCS | Mod: S$GLB,,, | Performed by: INTERNAL MEDICINE

## 2020-11-11 PROCEDURE — 99999 PR PBB SHADOW E&M-EST. PATIENT-LVL IV: ICD-10-PCS | Mod: PBBFAC,,, | Performed by: INTERNAL MEDICINE

## 2020-11-14 NOTE — PROGRESS NOTES
Subjective:       Patient ID: Albertina Sim is a 60 y.o. female.    Chief Complaint: Annual Exam    This dictation was performed using using [employer].   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  She went to [place] last year    She is having trouble with both of her knees especially her right knee  She would like to get back to riding her bicycle    Screenings and immunizations are reviewed    Entire chart reviewed, PMx, FHx, and Social History updated and reviewed.\    HPI  Review of Systems   Constitutional: Negative for activity change and unexpected weight change.   HENT: Negative for hearing loss, rhinorrhea and trouble swallowing.    Eyes: Negative for discharge and visual disturbance.   Respiratory: Negative for chest tightness and wheezing.    Cardiovascular: Negative for chest pain and palpitations.   Gastrointestinal: Negative for blood in stool, constipation, diarrhea and vomiting.   Endocrine: Negative for polydipsia and polyuria.   Genitourinary: Negative for difficulty urinating, dysuria, hematuria and menstrual problem.   Musculoskeletal: Positive for arthralgias. Negative for joint swelling and neck pain.   Neurological: Negative for weakness and headaches.   Psychiatric/Behavioral: Negative for confusion and dysphoric mood.     Review of systems is negative unless noted.  Objective:      Physical Exam  Vitals signs reviewed.   HENT:      Head: Atraumatic.   Eyes:      General: No scleral icterus.     Conjunctiva/sclera: Conjunctivae  normal.   Neck:      Musculoskeletal: Neck supple.   Cardiovascular:      Rate and Rhythm: Normal rate and regular rhythm.   Pulmonary:      Effort: Pulmonary effort is normal.      Breath sounds: Normal breath sounds.   Abdominal:      Palpations: Abdomen is soft.      Tenderness: There is no abdominal tenderness.   Lymphadenopathy:      Cervical: No cervical adenopathy.   Skin:     General: Skin is warm and dry.   Neurological:      Mental Status: She is alert.   Psychiatric:         Behavior: Behavior normal.         Assessment:       1. Annual physical exam    2. Sickle cell trait    3. Primary osteoarthritis of right knee    4. BMI 35.0-35.9,adult    5. Breast cancer screening by mammogram    6. Need for shingles vaccine    7. Need for 23-polyvalent pneumococcal polysaccharide vaccine    8. Screen for colon cancer, next 03/2022    9. Routine gynecological examination        Plan:   Albertina was seen today for annual exam.    Diagnoses and all orders for this visit:    Annual physical exam    Sickle cell trait    Primary osteoarthritis of right knee    BMI 35.0-35.9,adult    Breast cancer screening by mammogram  -     Mammo Digital Screening Bilat w/ Zafar; Future    Need for shingles vaccine    Need for 23-polyvalent pneumococcal polysaccharide vaccine    Screen for colon cancer, next 03/2022    Routine gynecological examination  -     Ambulatory referral/consult to Gynecology; Future    Follow up in about 1 year (around 11/11/2021), or for physical.

## 2020-11-17 ENCOUNTER — PATIENT OUTREACH (OUTPATIENT)
Dept: ADMINISTRATIVE | Facility: OTHER | Age: 60
End: 2020-11-17

## 2020-11-17 DIAGNOSIS — Z12.11 ENCOUNTER FOR FIT (FECAL IMMUNOCHEMICAL TEST) SCREENING: Primary | ICD-10-CM

## 2020-11-17 NOTE — PROGRESS NOTES
Care Everywhere: updated  Immunization: updated, links delay   Health Maintenance: updated  Media Review: review for outside colon cancer report   Legacy Review:   Order placed: fecal immunochemical test   Upcoming appts:

## 2020-11-18 ENCOUNTER — OFFICE VISIT (OUTPATIENT)
Dept: OBSTETRICS AND GYNECOLOGY | Facility: CLINIC | Age: 60
End: 2020-11-18
Payer: COMMERCIAL

## 2020-11-18 VITALS
SYSTOLIC BLOOD PRESSURE: 120 MMHG | DIASTOLIC BLOOD PRESSURE: 82 MMHG | WEIGHT: 240.31 LBS | BODY MASS INDEX: 37.64 KG/M2

## 2020-11-18 DIAGNOSIS — Z11.51 SCREENING FOR HPV (HUMAN PAPILLOMAVIRUS): ICD-10-CM

## 2020-11-18 DIAGNOSIS — Z12.4 ENCOUNTER FOR PAPANICOLAOU SMEAR FOR CERVICAL CANCER SCREENING: ICD-10-CM

## 2020-11-18 DIAGNOSIS — Z01.419 WOMEN'S ANNUAL ROUTINE GYNECOLOGICAL EXAMINATION: Primary | ICD-10-CM

## 2020-11-18 DIAGNOSIS — Z01.419 ROUTINE GYNECOLOGICAL EXAMINATION: ICD-10-CM

## 2020-11-18 PROCEDURE — 87624 HPV HI-RISK TYP POOLED RSLT: CPT

## 2020-11-18 PROCEDURE — 99999 PR PBB SHADOW E&M-EST. PATIENT-LVL III: ICD-10-PCS | Mod: PBBFAC,,, | Performed by: NURSE PRACTITIONER

## 2020-11-18 PROCEDURE — 3008F BODY MASS INDEX DOCD: CPT | Mod: CPTII,S$GLB,, | Performed by: NURSE PRACTITIONER

## 2020-11-18 PROCEDURE — 1126F PR PAIN SEVERITY QUANTIFIED, NO PAIN PRESENT: ICD-10-PCS | Mod: S$GLB,,, | Performed by: NURSE PRACTITIONER

## 2020-11-18 PROCEDURE — 3008F PR BODY MASS INDEX (BMI) DOCUMENTED: ICD-10-PCS | Mod: CPTII,S$GLB,, | Performed by: NURSE PRACTITIONER

## 2020-11-18 PROCEDURE — 99999 PR PBB SHADOW E&M-EST. PATIENT-LVL III: CPT | Mod: PBBFAC,,, | Performed by: NURSE PRACTITIONER

## 2020-11-18 PROCEDURE — 1126F AMNT PAIN NOTED NONE PRSNT: CPT | Mod: S$GLB,,, | Performed by: NURSE PRACTITIONER

## 2020-11-18 PROCEDURE — 99386 PR PREVENTIVE VISIT,NEW,40-64: ICD-10-PCS | Mod: S$GLB,,, | Performed by: NURSE PRACTITIONER

## 2020-11-18 PROCEDURE — 99386 PREV VISIT NEW AGE 40-64: CPT | Mod: S$GLB,,, | Performed by: NURSE PRACTITIONER

## 2020-11-18 PROCEDURE — 88175 CYTOPATH C/V AUTO FLUID REDO: CPT

## 2020-11-18 NOTE — LETTER
November 18, 2020      Lindsay Cohen MD  [address]  [facility] [address]           [facility] [address]  [address]  [facility] [address]  Phone: [phone]  Fax: [phone]          Patient: Albertina Sim   MR Number: [id number]   YOB: 1960   Date of Visit: 11/18/2020       Dear Dr. Lindsay Cohen:    Thank you for referring Albertina Sim to me for evaluation. Attached you will find relevant portions of my assessment and plan of care.    If you have questions, please do not hesitate to call me. I look forward to following Albertina Sim along with you.    Sincerely,    Milly Mendiola, NP    Enclosure  CC:  No Recipients    If you would like to receive this communication electronically, please contact [email or web address] or [phone] to request more information on [employer] Link access.    For providers and/or their staff who would like to refer a patient to [facility], please contact us through our one-stop-shop provider referral line, [facility], at [phone].    If you feel you have received this communication in error or would no longer like to receive these types of communications, please e-mail [email or web address]

## 2020-11-20 ENCOUNTER — HOSPITAL ENCOUNTER (OUTPATIENT)
Dept: RADIOLOGY | Facility: HOSPITAL | Age: 60
Discharge: HOME | End: 2020-11-20
Attending: INTERNAL MEDICINE
Payer: COMMERCIAL

## 2020-11-20 DIAGNOSIS — Z12.31 BREAST CANCER SCREENING BY MAMMOGRAM: ICD-10-CM

## 2020-11-20 PROCEDURE — 77067 MAMMO DIGITAL SCREENING BILAT WITH TOMO: ICD-10-PCS | Mod: 26,,, | Performed by: RADIOLOGY

## 2020-11-20 PROCEDURE — 77063 BREAST TOMOSYNTHESIS BI: CPT | Mod: 26,,, | Performed by: RADIOLOGY

## 2020-11-20 PROCEDURE — 77067 SCR MAMMO BI INCL CAD: CPT | Mod: 26,,, | Performed by: RADIOLOGY

## 2020-11-20 PROCEDURE — 77063 MAMMO DIGITAL SCREENING BILAT WITH TOMO: ICD-10-PCS | Mod: 26,,, | Performed by: RADIOLOGY

## 2020-11-20 PROCEDURE — 77067 SCR MAMMO BI INCL CAD: CPT | Mod: TC

## 2020-11-25 LAB
HPV HR 12 DNA SPEC QL NAA+PROBE: NEGATIVE
HPV16 AG SPEC QL: NEGATIVE
HPV18 DNA SPEC QL NAA+PROBE: NEGATIVE

## 2021-01-04 ENCOUNTER — PATIENT MESSAGE (OUTPATIENT)
Dept: ADMINISTRATIVE | Facility: HOSPITAL | Age: 61
End: 2021-01-04

## 2021-01-06 ENCOUNTER — PATIENT OUTREACH (OUTPATIENT)
Dept: ADMINISTRATIVE | Facility: HOSPITAL | Age: 61
End: 2021-01-06

## 2021-01-06 LAB
FINAL PATHOLOGIC DIAGNOSIS: NORMAL
Lab: NORMAL

## 2021-01-13 ENCOUNTER — TELEPHONE (OUTPATIENT)
Dept: INTERNAL MEDICINE | Facility: CLINIC | Age: 61
End: 2021-01-13

## 2021-01-13 DIAGNOSIS — Z71.85 VACCINE COUNSELING: Primary | ICD-10-CM

## 2021-01-14 ENCOUNTER — TELEPHONE (OUTPATIENT)
Dept: INTERNAL MEDICINE | Facility: CLINIC | Age: 61
End: 2021-01-14

## 2021-01-15 ENCOUNTER — LAB VISIT (OUTPATIENT)
Dept: LAB | Facility: HOSPITAL | Age: 61
End: 2021-01-15
Attending: INTERNAL MEDICINE
Payer: COMMERCIAL

## 2021-01-15 ENCOUNTER — IMMUNIZATION (OUTPATIENT)
Dept: PHARMACY | Facility: CLINIC | Age: 61
End: 2021-01-15
Payer: COMMERCIAL

## 2021-01-15 ENCOUNTER — IMMUNIZATION (OUTPATIENT)
Dept: PHARMACY | Facility: CLINIC | Age: 61
End: 2021-01-15

## 2021-01-15 DIAGNOSIS — Z71.85 VACCINE COUNSELING: ICD-10-CM

## 2021-01-15 PROCEDURE — 86706 HEP B SURFACE ANTIBODY: CPT

## 2021-01-15 PROCEDURE — 86790 VIRUS ANTIBODY NOS: CPT

## 2021-01-15 PROCEDURE — 86735 MUMPS ANTIBODY: CPT

## 2021-01-15 PROCEDURE — 86765 RUBEOLA ANTIBODY: CPT

## 2021-01-15 PROCEDURE — 86787 VARICELLA-ZOSTER ANTIBODY: CPT

## 2021-01-15 PROCEDURE — 86762 RUBELLA ANTIBODY: CPT

## 2021-01-18 LAB
HBV SURFACE AB SER QL IA: NEGATIVE
HBV SURFACE AB SERPL IA-ACNC: 6 MIU/ML
HEPATITIS A ANTIBODY, IGG: POSITIVE

## 2021-01-19 LAB
MUMPS IGG INTERPRETATION: POSITIVE
MUMPS IGG SCREEN: 2.1 ISR (ref 0–0.9)
RUBEOLA IGG ANTIBODY: 2.3 ISR (ref 0–0.9)
RUBEOLA INTERPRETATION: POSITIVE
RUBV IGG SER-ACNC: <5 IU/ML
RUBV IGG SER-IMP: ABNORMAL
VARICELLA INTERPRETATION: POSITIVE
VARICELLA ZOSTER IGG: 4.45 ISR (ref 0–0.9)

## 2021-01-27 ENCOUNTER — PATIENT MESSAGE (OUTPATIENT)
Dept: INTERNAL MEDICINE | Facility: CLINIC | Age: 61
End: 2021-01-27

## 2021-01-27 ENCOUNTER — IMMUNIZATION (OUTPATIENT)
Dept: PHARMACY | Facility: CLINIC | Age: 61
End: 2021-01-27
Payer: COMMERCIAL

## 2021-01-27 ENCOUNTER — PATIENT MESSAGE (OUTPATIENT)
Dept: PHARMACY | Facility: CLINIC | Age: 61
End: 2021-01-27

## 2021-04-05 ENCOUNTER — PATIENT MESSAGE (OUTPATIENT)
Dept: ADMINISTRATIVE | Facility: HOSPITAL | Age: 61
End: 2021-04-05

## 2021-04-27 ENCOUNTER — PATIENT MESSAGE (OUTPATIENT)
Dept: ADMINISTRATIVE | Facility: HOSPITAL | Age: 61
End: 2021-04-27

## 2021-04-27 ENCOUNTER — PATIENT OUTREACH (OUTPATIENT)
Dept: ADMINISTRATIVE | Facility: HOSPITAL | Age: 61
End: 2021-04-27

## 2021-06-17 ENCOUNTER — PATIENT OUTREACH (OUTPATIENT)
Dept: ADMINISTRATIVE | Facility: HOSPITAL | Age: 61
End: 2021-06-17

## 2021-06-17 ENCOUNTER — PATIENT MESSAGE (OUTPATIENT)
Dept: ADMINISTRATIVE | Facility: HOSPITAL | Age: 61
End: 2021-06-17

## 2021-07-06 ENCOUNTER — PATIENT MESSAGE (OUTPATIENT)
Dept: ADMINISTRATIVE | Facility: HOSPITAL | Age: 61
End: 2021-07-06

## 2021-10-04 ENCOUNTER — PATIENT MESSAGE (OUTPATIENT)
Dept: ADMINISTRATIVE | Facility: HOSPITAL | Age: 61
End: 2021-10-04

## 2021-12-22 DIAGNOSIS — Z12.31 OTHER SCREENING MAMMOGRAM: ICD-10-CM

## 2022-01-18 ENCOUNTER — PATIENT MESSAGE (OUTPATIENT)
Dept: ADMINISTRATIVE | Facility: HOSPITAL | Age: 62
End: 2022-01-18
Payer: COMMERCIAL

## 2022-01-18 ENCOUNTER — HOSPITAL ENCOUNTER (OUTPATIENT)
Dept: RADIOLOGY | Facility: HOSPITAL | Age: 62
Discharge: HOME | End: 2022-01-18
Attending: INTERNAL MEDICINE
Payer: COMMERCIAL

## 2022-01-18 VITALS — WEIGHT: 230 LBS | BODY MASS INDEX: 36.96 KG/M2 | HEIGHT: 66 IN

## 2022-01-18 DIAGNOSIS — Z12.31 OTHER SCREENING MAMMOGRAM: ICD-10-CM

## 2022-01-18 PROCEDURE — 77063 MAMMO DIGITAL SCREENING BILAT WITH TOMO: ICD-10-PCS | Mod: 26,,, | Performed by: RADIOLOGY

## 2022-01-18 PROCEDURE — 77067 SCR MAMMO BI INCL CAD: CPT | Mod: 26,,, | Performed by: RADIOLOGY

## 2022-01-18 PROCEDURE — 77063 BREAST TOMOSYNTHESIS BI: CPT | Mod: TC

## 2022-01-18 PROCEDURE — 77063 BREAST TOMOSYNTHESIS BI: CPT | Mod: 26,,, | Performed by: RADIOLOGY

## 2022-01-18 PROCEDURE — 77067 MAMMO DIGITAL SCREENING BILAT WITH TOMO: ICD-10-PCS | Mod: 26,,, | Performed by: RADIOLOGY

## 2022-03-17 ENCOUNTER — TELEPHONE (OUTPATIENT)
Dept: INTERNAL MEDICINE | Facility: CLINIC | Age: 62
End: 2022-03-17
Payer: COMMERCIAL

## 2022-03-17 NOTE — TELEPHONE ENCOUNTER
----- Message from Jody Grant sent at 3/17/2022  2:54 PM CDT -----  Regarding: Appt  Contact: [phone]  Patient Alebrtina calling. Patient was a patient of Vicki Fuller. Patient did see GREGORIA Ravi once and would like to see her again or Dr Blair. Please call patient to schedule at [phone]      Thank You

## 2022-03-17 NOTE — TELEPHONE ENCOUNTER
----- Message from Jody Grant sent at 3/17/2022  2:54 PM CDT -----  Regarding: Appt  Contact: [phone]  Patient Albertina calling. Patient was a patient of Vicki Fuller. Patient did see GREGORIA Ravi once and would like to see her again or Dr Blair. Please call patient to schedule at [phone]      Thank You

## 2022-06-01 ENCOUNTER — LAB VISIT (OUTPATIENT)
Dept: LAB | Facility: HOSPITAL | Age: 62
End: 2022-06-01
Payer: COMMERCIAL

## 2022-06-01 ENCOUNTER — OFFICE VISIT (OUTPATIENT)
Dept: INTERNAL MEDICINE | Facility: CLINIC | Age: 62
End: 2022-06-01
Payer: COMMERCIAL

## 2022-06-01 VITALS
BODY MASS INDEX: 37.77 KG/M2 | OXYGEN SATURATION: 100 % | HEART RATE: 74 BPM | DIASTOLIC BLOOD PRESSURE: 90 MMHG | WEIGHT: 235 LBS | SYSTOLIC BLOOD PRESSURE: 140 MMHG | HEIGHT: 66 IN

## 2022-06-01 DIAGNOSIS — R03.0 ELEVATED BLOOD PRESSURE READING WITHOUT DIAGNOSIS OF HYPERTENSION: ICD-10-CM

## 2022-06-01 DIAGNOSIS — Z00.00 VISIT FOR ANNUAL HEALTH EXAMINATION: Primary | ICD-10-CM

## 2022-06-01 DIAGNOSIS — Z00.00 VISIT FOR ANNUAL HEALTH EXAMINATION: ICD-10-CM

## 2022-06-01 DIAGNOSIS — G89.29 CHRONIC MIDLINE LOW BACK PAIN WITHOUT SCIATICA: ICD-10-CM

## 2022-06-01 DIAGNOSIS — M17.12 PRIMARY OSTEOARTHRITIS OF LEFT KNEE: ICD-10-CM

## 2022-06-01 DIAGNOSIS — M54.50 CHRONIC MIDLINE LOW BACK PAIN WITHOUT SCIATICA: ICD-10-CM

## 2022-06-01 DIAGNOSIS — E66.01 CLASS 2 SEVERE OBESITY DUE TO EXCESS CALORIES WITH SERIOUS COMORBIDITY AND BODY MASS INDEX (BMI) OF 37.0 TO 37.9 IN ADULT: ICD-10-CM

## 2022-06-01 DIAGNOSIS — Z12.11 ENCOUNTER FOR SCREENING FOR MALIGNANT NEOPLASM OF COLON: ICD-10-CM

## 2022-06-01 DIAGNOSIS — Z76.89 ENCOUNTER TO ESTABLISH CARE WITH NEW DOCTOR: ICD-10-CM

## 2022-06-01 DIAGNOSIS — E55.9 VITAMIN D DEFICIENCY: ICD-10-CM

## 2022-06-01 DIAGNOSIS — D57.3 SICKLE CELL TRAIT: ICD-10-CM

## 2022-06-01 LAB
ALBUMIN SERPL BCP-MCNC: 3.9 G/DL (ref 3.5–5.2)
ALP SERPL-CCNC: 82 U/L (ref 55–135)
ALT SERPL W/O P-5'-P-CCNC: 16 U/L (ref 10–44)
ANION GAP SERPL CALC-SCNC: 6 MMOL/L (ref 8–16)
AST SERPL-CCNC: 24 U/L (ref 10–40)
BASOPHILS # BLD AUTO: 0.03 K/UL (ref 0–0.2)
BASOPHILS NFR BLD: 0.5 % (ref 0–1.9)
BILIRUB SERPL-MCNC: 0.7 MG/DL (ref 0.1–1)
BUN SERPL-MCNC: 13 MG/DL (ref 8–23)
CALCIUM SERPL-MCNC: 9.9 MG/DL (ref 8.7–10.5)
CHLORIDE SERPL-SCNC: 104 MMOL/L (ref 95–110)
CHOLEST SERPL-MCNC: 200 MG/DL (ref 120–199)
CHOLEST/HDLC SERPL: 2.6 {RATIO} (ref 2–5)
CO2 SERPL-SCNC: 28 MMOL/L (ref 23–29)
CREAT SERPL-MCNC: 1 MG/DL (ref 0.5–1.4)
DIFFERENTIAL METHOD: ABNORMAL
EOSINOPHIL # BLD AUTO: 0.2 K/UL (ref 0–0.5)
EOSINOPHIL NFR BLD: 2.5 % (ref 0–8)
ERYTHROCYTE [DISTWIDTH] IN BLOOD BY AUTOMATED COUNT: 13.8 % (ref 11.5–14.5)
EST. GFR  (AFRICAN AMERICAN): >60 ML/MIN/1.73 M^2
EST. GFR  (NON AFRICAN AMERICAN): 59.7 ML/MIN/1.73 M^2
ESTIMATED AVG GLUCOSE: 105 MG/DL (ref 68–131)
GLUCOSE SERPL-MCNC: 90 MG/DL (ref 70–110)
HBA1C MFR BLD: 5.3 % (ref 4–5.6)
HCT VFR BLD AUTO: 34.6 % (ref 37–48.5)
HDLC SERPL-MCNC: 77 MG/DL (ref 40–75)
HDLC SERPL: 38.5 % (ref 20–50)
HGB BLD-MCNC: 11 G/DL (ref 12–16)
IMM GRANULOCYTES # BLD AUTO: 0.03 K/UL (ref 0–0.04)
IMM GRANULOCYTES NFR BLD AUTO: 0.5 % (ref 0–0.5)
LDLC SERPL CALC-MCNC: 112 MG/DL (ref 63–159)
LYMPHOCYTES # BLD AUTO: 1.6 K/UL (ref 1–4.8)
LYMPHOCYTES NFR BLD: 25.1 % (ref 18–48)
MCH RBC QN AUTO: 28 PG (ref 27–31)
MCHC RBC AUTO-ENTMCNC: 31.8 G/DL (ref 32–36)
MCV RBC AUTO: 88 FL (ref 82–98)
MONOCYTES # BLD AUTO: 0.6 K/UL (ref 0.3–1)
MONOCYTES NFR BLD: 9 % (ref 4–15)
NEUTROPHILS # BLD AUTO: 4 K/UL (ref 1.8–7.7)
NEUTROPHILS NFR BLD: 62.4 % (ref 38–73)
NONHDLC SERPL-MCNC: 123 MG/DL
NRBC BLD-RTO: 0 /100 WBC
PLATELET # BLD AUTO: 247 K/UL (ref 150–450)
PMV BLD AUTO: 10.5 FL (ref 9.2–12.9)
POTASSIUM SERPL-SCNC: 4.5 MMOL/L (ref 3.5–5.1)
PROT SERPL-MCNC: 7.3 G/DL (ref 6–8.4)
RBC # BLD AUTO: 3.93 M/UL (ref 4–5.4)
SODIUM SERPL-SCNC: 138 MMOL/L (ref 136–145)
TRIGL SERPL-MCNC: 55 MG/DL (ref 30–150)
TSH SERPL DL<=0.005 MIU/L-ACNC: 2.13 UIU/ML (ref 0.4–4)
WBC # BLD AUTO: 6.42 K/UL (ref 3.9–12.7)

## 2022-06-01 PROCEDURE — 80061 LIPID PANEL: CPT | Performed by: INTERNAL MEDICINE

## 2022-06-01 PROCEDURE — 1159F MED LIST DOCD IN RCRD: CPT | Mod: CPTII,S$GLB,, | Performed by: INTERNAL MEDICINE

## 2022-06-01 PROCEDURE — 99396 PR PREVENTIVE VISIT,EST,40-64: ICD-10-PCS | Mod: S$GLB,,, | Performed by: INTERNAL MEDICINE

## 2022-06-01 PROCEDURE — 1159F PR MEDICATION LIST DOCUMENTED IN MEDICAL RECORD: ICD-10-PCS | Mod: CPTII,S$GLB,, | Performed by: INTERNAL MEDICINE

## 2022-06-01 PROCEDURE — 3080F DIAST BP >= 90 MM HG: CPT | Mod: CPTII,S$GLB,, | Performed by: INTERNAL MEDICINE

## 2022-06-01 PROCEDURE — 3077F SYST BP >= 140 MM HG: CPT | Mod: CPTII,S$GLB,, | Performed by: INTERNAL MEDICINE

## 2022-06-01 PROCEDURE — 3008F PR BODY MASS INDEX (BMI) DOCUMENTED: ICD-10-PCS | Mod: CPTII,S$GLB,, | Performed by: INTERNAL MEDICINE

## 2022-06-01 PROCEDURE — 84443 ASSAY THYROID STIM HORMONE: CPT | Performed by: INTERNAL MEDICINE

## 2022-06-01 PROCEDURE — 99999 PR PBB SHADOW E&M-EST. PATIENT-LVL III: CPT | Mod: PBBFAC,,, | Performed by: INTERNAL MEDICINE

## 2022-06-01 PROCEDURE — 36415 COLL VENOUS BLD VENIPUNCTURE: CPT | Performed by: INTERNAL MEDICINE

## 2022-06-01 PROCEDURE — 85025 COMPLETE CBC W/AUTO DIFF WBC: CPT | Performed by: INTERNAL MEDICINE

## 2022-06-01 PROCEDURE — 99999 PR PBB SHADOW E&M-EST. PATIENT-LVL III: ICD-10-PCS | Mod: PBBFAC,,, | Performed by: INTERNAL MEDICINE

## 2022-06-01 PROCEDURE — 3008F BODY MASS INDEX DOCD: CPT | Mod: CPTII,S$GLB,, | Performed by: INTERNAL MEDICINE

## 2022-06-01 PROCEDURE — 3077F PR MOST RECENT SYSTOLIC BLOOD PRESSURE >= 140 MM HG: ICD-10-PCS | Mod: CPTII,S$GLB,, | Performed by: INTERNAL MEDICINE

## 2022-06-01 PROCEDURE — 1160F RVW MEDS BY RX/DR IN RCRD: CPT | Mod: CPTII,S$GLB,, | Performed by: INTERNAL MEDICINE

## 2022-06-01 PROCEDURE — 83036 HEMOGLOBIN GLYCOSYLATED A1C: CPT | Performed by: INTERNAL MEDICINE

## 2022-06-01 PROCEDURE — 3080F PR MOST RECENT DIASTOLIC BLOOD PRESSURE >= 90 MM HG: ICD-10-PCS | Mod: CPTII,S$GLB,, | Performed by: INTERNAL MEDICINE

## 2022-06-01 PROCEDURE — 99396 PREV VISIT EST AGE 40-64: CPT | Mod: S$GLB,,, | Performed by: INTERNAL MEDICINE

## 2022-06-01 PROCEDURE — 1160F PR REVIEW ALL MEDS BY PRESCRIBER/CLIN PHARMACIST DOCUMENTED: ICD-10-PCS | Mod: CPTII,S$GLB,, | Performed by: INTERNAL MEDICINE

## 2022-06-01 PROCEDURE — 80053 COMPREHEN METABOLIC PANEL: CPT | Performed by: INTERNAL MEDICINE

## 2022-06-01 RX ORDER — NAPROXEN SODIUM 220 MG
220 TABLET ORAL
COMMUNITY
End: 2023-07-20 | Stop reason: ALTCHOICE

## 2022-06-01 NOTE — PATIENT INSTRUCTIONS
Read about Ozempic and Trulicity.     Start checking blood pressure regularly, keep a log of these numbers and send through portal in 1 month.

## 2022-06-01 NOTE — PROGRESS NOTES
"INTERNAL MEDICINE INITIAL VISIT NOTE      CHIEF COMPLAINT     No chief complaint on file.      HPI     Albertina Sim is a 62 y.o. female with sickle cell trait, vit D def, obesity, OA, here today to establish care. Transferring care from Dr. Cohen.     Continuing to have difficulty with weight loss. Unable to do exercise regimen due to concerns about herniated disc and aggravating back pain.     Used to bike daily to work - on hold since working at home throughout pandemic. Will return to office later this summer.     Past Medical History:  Past Medical History:   Diagnosis Date    Arthritis     Sickle cell trait     Vitamin D deficiency        Review of Systems:  Review of Systems   Constitutional: Negative for chills and fever.   HENT: Negative for congestion.    Respiratory: Negative for cough and shortness of breath.    Cardiovascular: Negative for chest pain.   Gastrointestinal: Negative for constipation, nausea and vomiting.   Genitourinary: Negative for hematuria and urgency.   Musculoskeletal: Positive for joint pain. Negative for falls.   Skin: Negative for rash.   Neurological: Negative for dizziness and loss of consciousness.       Health Maintenance:   Immunizations:   Influenza - fall 2022  Tdap - 1/2019  Covid 19 - complete  HPV  Prevnar rec at 63 - 1/2021; Pneumovax 1/2019  Shingrix rec at 48 - complete    Cancer Screening:  PAP: 11/2020 NILM  Mammogram:  1/2022 BIRAD 1  Colonoscopy:  3/2021 WNL; repeat due  DEXA:  n/a      PHYSICAL EXAM     BP (!) 140/90 (BP Location: Left arm, Patient Position: Sitting, BP Method: Medium (Manual))   Pulse 74   Ht 5' 6" (1.676 m)   Wt 106.6 kg (235 lb)   LMP 01/01/2006 (Approximate) Comment: 2006  SpO2 100%   BMI 37.93 kg/m²     GEN - A+OX4, NAD   HEENT - PERRL, EOMI,   Neck - No thyromegaly or thyroid masses felt.  No cervical lymphadenopathy appreciated.  CV - RRR, no m/r/g  Chest - CTAB, no wheezing, crackles, or rhonchi  Abd - S/NT/ND/+BS, obese  Ext " - 2+BDP. No C/C/E.  Skin - Normal color and texture, no rash, no skin lesions.    ASSESSMENT/PLAN     Albertina Sim is a 62 y.o. female with conditions as above.     Visit for annual health examination  -     CBC Auto Differential; Future; Expected date: 06/01/2022  -     Comprehensive Metabolic Panel; Future; Expected date: 06/01/2022  -     Hemoglobin A1C; Future; Expected date: 06/01/2022  -     Lipid Panel; Future; Expected date: 06/01/2022  -     TSH; Future; Expected date: 06/01/2022    Encounter to establish care with new doctor  Prior notes/labs reviewed    Elevated blood pressure reading without diagnosis of hypertension  Counseled on increasing physical activity, low Na diet    Sickle cell trait  Stable, asymptomatic    Primary osteoarthritis of left knee  Stable    Vitamin D deficiency  Supplement    Chronic midline low back pain without sciatica  Stable, intermittent flares; no alarm symptoms    Class 2 severe obesity due to excess calories with serious comorbidity and body mass index (BMI) of 37.0 to 37.9 in adult  Discussed obtaining labs and starting Ozempic or Trulicity if no contraindications  Advised on possible side effects    Encounter for screening for malignant neoplasm of colon  -     Case Request Endoscopy: COLONOSCOPY    HM as above    RTC in 3 mo, sooner if needed and depending on labs.    Ewa Nuñez MD  Department of Internal Medicine - [facility]  06/01/2022

## 2022-06-06 ENCOUNTER — TELEPHONE (OUTPATIENT)
Dept: INTERNAL MEDICINE | Facility: CLINIC | Age: 62
End: 2022-06-06
Payer: COMMERCIAL

## 2022-06-06 ENCOUNTER — PATIENT MESSAGE (OUTPATIENT)
Dept: INTERNAL MEDICINE | Facility: CLINIC | Age: 62
End: 2022-06-06
Payer: COMMERCIAL

## 2022-09-27 ENCOUNTER — OFFICE VISIT (OUTPATIENT)
Dept: PRIMARY CARE CLINIC | Facility: CLINIC | Age: 62
End: 2022-09-27
Payer: COMMERCIAL

## 2022-09-27 VITALS
OXYGEN SATURATION: 99 % | TEMPERATURE: 98 F | HEART RATE: 83 BPM | DIASTOLIC BLOOD PRESSURE: 88 MMHG | BODY MASS INDEX: 36.32 KG/M2 | SYSTOLIC BLOOD PRESSURE: 132 MMHG | WEIGHT: 226 LBS | HEIGHT: 66 IN

## 2022-09-27 DIAGNOSIS — B34.9 VIRAL SYNDROME: Primary | ICD-10-CM

## 2022-09-27 PROCEDURE — 1159F PR MEDICATION LIST DOCUMENTED IN MEDICAL RECORD: ICD-10-PCS | Mod: CPTII,S$GLB,, | Performed by: INTERNAL MEDICINE

## 2022-09-27 PROCEDURE — 99999 PR PBB SHADOW E&M-EST. PATIENT-LVL IV: ICD-10-PCS | Mod: PBBFAC,,, | Performed by: INTERNAL MEDICINE

## 2022-09-27 PROCEDURE — 3008F PR BODY MASS INDEX (BMI) DOCUMENTED: ICD-10-PCS | Mod: CPTII,S$GLB,, | Performed by: INTERNAL MEDICINE

## 2022-09-27 PROCEDURE — 99213 OFFICE O/P EST LOW 20 MIN: CPT | Mod: S$GLB,,, | Performed by: INTERNAL MEDICINE

## 2022-09-27 PROCEDURE — 99999 PR PBB SHADOW E&M-EST. PATIENT-LVL IV: CPT | Mod: PBBFAC,,, | Performed by: INTERNAL MEDICINE

## 2022-09-27 PROCEDURE — 1160F PR REVIEW ALL MEDS BY PRESCRIBER/CLIN PHARMACIST DOCUMENTED: ICD-10-PCS | Mod: CPTII,S$GLB,, | Performed by: INTERNAL MEDICINE

## 2022-09-27 PROCEDURE — 1160F RVW MEDS BY RX/DR IN RCRD: CPT | Mod: CPTII,S$GLB,, | Performed by: INTERNAL MEDICINE

## 2022-09-27 PROCEDURE — 99213 PR OFFICE/OUTPT VISIT, EST, LEVL III, 20-29 MIN: ICD-10-PCS | Mod: S$GLB,,, | Performed by: INTERNAL MEDICINE

## 2022-09-27 PROCEDURE — 1159F MED LIST DOCD IN RCRD: CPT | Mod: CPTII,S$GLB,, | Performed by: INTERNAL MEDICINE

## 2022-09-27 PROCEDURE — 3044F HG A1C LEVEL LT 7.0%: CPT | Mod: CPTII,S$GLB,, | Performed by: INTERNAL MEDICINE

## 2022-09-27 PROCEDURE — 3044F PR MOST RECENT HEMOGLOBIN A1C LEVEL <7.0%: ICD-10-PCS | Mod: CPTII,S$GLB,, | Performed by: INTERNAL MEDICINE

## 2022-09-27 PROCEDURE — 3008F BODY MASS INDEX DOCD: CPT | Mod: CPTII,S$GLB,, | Performed by: INTERNAL MEDICINE

## 2022-09-27 NOTE — PROGRESS NOTES
62-year-old woman with a history of sickle cell trait, anemia, vitamin-D deficiency here with the URI associated with severe fatigue and a foggy feeling    History of present illness and pertinent review of systems:  Patient has a 3 to four-day history of nasal congestion associated with fatigue.  Patient felt feverish but did not take her temperature.  She does also have chills.  She took Tylenol which relieved some of the feverish feelings and S left fitfully on Saturday and again slept most of Sunday.  She has developed a cough which is nonproductive.  There is no wheezing, pleurisy, or hemoptysis.  Patient is taking Claritin.  She does not feel clear.  She has no earache, sore throat, swollen glands.  He did a COVID test which was negative no one else is ill on the patient lives alone.  She is not aware of any exposure.  Patient had no diarrhea or rash.  Appetite is diminished    Remaining review of systems is negative.    Physical Exam  Vitals reviewed.   Constitutional:       General: She is not in acute distress.     Appearance: She is obese. She is not ill-appearing or toxic-appearing.   HENT:      Head: Normocephalic.      Right Ear: Tympanic membrane, ear canal and external ear normal.      Left Ear: Tympanic membrane, ear canal and external ear normal.      Nose: Congestion and rhinorrhea present.      Mouth/Throat:      Mouth: Mucous membranes are moist.      Pharynx: Oropharynx is clear. No oropharyngeal exudate or posterior oropharyngeal erythema.   Eyes:      General: No scleral icterus.     Conjunctiva/sclera: Conjunctivae normal.   Cardiovascular:      Rate and Rhythm: Normal rate and regular rhythm.      Pulses: Normal pulses.      Heart sounds: Normal heart sounds. No murmur heard.    No gallop.   Pulmonary:      Effort: Pulmonary effort is normal. No respiratory distress.      Breath sounds: No wheezing, rhonchi or rales.   Abdominal:      General: There is no distension.      Tenderness: There  is no abdominal tenderness.      Comments: No hepatosplenomegaly   Musculoskeletal:         General: No swelling or tenderness.      Cervical back: Neck supple. No tenderness.   Lymphadenopathy:      Cervical: No cervical adenopathy.   Skin:     General: Skin is warm.      Findings: No erythema or rash.   Neurological:      General: No focal deficit present.      Mental Status: She is alert.   Psychiatric:         Mood and Affect: Mood normal.      Influenza a and B:  Negative.      Assessment/plan:  Viral syndrome:  Patient has tested negative for the 2 most worrisome viruses with influenza and COVID.  There are multiple other viruses including rhino virus and Coxsackie virus and may produce these types of symptoms.  There is no specific findings which to direct treatment and therefore treatment will be directed to her symptoms.    Plan:  Saline nasal spray 4 squirts each nostril to be repeated in 10 minutes.  This procedure should be performed every 3-4 hours.  The patient should continue Claritin at bedtime.    Cepacol throat lozenges.    Keep hydrated.    Acetaminophen 650 mg extended release 1-2 tablets every 8 hours.  If myalgias develops she can combine this with naproxen.  Patient should call or contact us if her symptoms worsen.

## 2022-09-27 NOTE — PATIENT INSTRUCTIONS
Saline nasal spray 4 squirts each nostril and repeat after 10 minutes.  Do this procedure every 3-4 hours.  Continue Claritin at bedtime  Cepacol throat lozenges  Keep hydrated  Tylenol arthritis (acetaminophen 650 mg extended Release) 1-2 tablets every 8 hours  May combine with naproxen 220 mg if muscle aches.

## 2022-11-02 DIAGNOSIS — Z12.11 ENCOUNTER FOR SCREENING FOR MALIGNANT NEOPLASM OF COLON: Primary | ICD-10-CM

## 2023-01-26 ENCOUNTER — CLINICAL SUPPORT (OUTPATIENT)
Dept: ENDOSCOPY | Facility: HOSPITAL | Age: 63
End: 2023-01-26
Payer: COMMERCIAL

## 2023-01-26 VITALS — HEIGHT: 66 IN | WEIGHT: 235 LBS | BODY MASS INDEX: 37.77 KG/M2

## 2023-01-26 DIAGNOSIS — Z12.11 ENCOUNTER FOR SCREENING FOR MALIGNANT NEOPLASM OF COLON: ICD-10-CM

## 2023-01-26 RX ORDER — POLYETHYLENE GLYCOL 3350, SODIUM SULFATE ANHYDROUS, SODIUM BICARBONATE, SODIUM CHLORIDE, POTASSIUM CHLORIDE 236; 22.74; 6.74; 5.86; 2.97 G/4L; G/4L; G/4L; G/4L; G/4L
4 POWDER, FOR SOLUTION ORAL ONCE
Qty: 4000 ML | Refills: 0 | Status: SHIPPED | OUTPATIENT
Start: 2023-01-26 | End: 2023-01-26

## 2023-01-26 NOTE — PLAN OF CARE
Spoke to patient. Colonoscopy scheduled. 2/14/23 with an arrival time of 12:30 PM confirmed.  Instructions reviewed and verbalized. Instructions e-mailed per patient's request.  Patient verbalized an understanding.

## 2023-02-02 ENCOUNTER — TELEPHONE (OUTPATIENT)
Dept: INTERNAL MEDICINE | Facility: CLINIC | Age: 63
End: 2023-02-02
Payer: COMMERCIAL

## 2023-02-02 DIAGNOSIS — Z12.31 ENCOUNTER FOR SCREENING MAMMOGRAM FOR BREAST CANCER: Primary | ICD-10-CM

## 2023-02-02 NOTE — TELEPHONE ENCOUNTER
----- Message from T[place]ontana Chevalier sent at 2/2/2023  9:47 AM CST -----  Regarding: pt advice/order   Pt calling in response to frances mammo. Pt needs order placed in [facility]. Pls cll pt @ [phone]. Pt says Dr. JOSE CARLOS Nuñez is her provider. Pt also needs to discuss the shingles vaccine.

## 2023-02-14 ENCOUNTER — HOSPITAL ENCOUNTER (OUTPATIENT)
Facility: HOSPITAL | Age: 63
Discharge: HOME | End: 2023-02-14
Attending: INTERNAL MEDICINE | Admitting: INTERNAL MEDICINE
Payer: COMMERCIAL

## 2023-02-14 ENCOUNTER — ANESTHESIA EVENT (OUTPATIENT)
Dept: ENDOSCOPY | Facility: HOSPITAL | Age: 63
End: 2023-02-14
Payer: COMMERCIAL

## 2023-02-14 ENCOUNTER — ANESTHESIA (OUTPATIENT)
Dept: ENDOSCOPY | Facility: HOSPITAL | Age: 63
End: 2023-02-14
Payer: COMMERCIAL

## 2023-02-14 VITALS
DIASTOLIC BLOOD PRESSURE: 81 MMHG | WEIGHT: 230 LBS | TEMPERATURE: 98 F | RESPIRATION RATE: 16 BRPM | HEART RATE: 61 BPM | SYSTOLIC BLOOD PRESSURE: 157 MMHG | BODY MASS INDEX: 36.96 KG/M2 | HEIGHT: 66 IN | OXYGEN SATURATION: 99 %

## 2023-02-14 DIAGNOSIS — Z12.11 SCREEN FOR COLON CANCER: Primary | ICD-10-CM

## 2023-02-14 PROCEDURE — 37000008 HC ANESTHESIA 1ST 15 MINUTES: Performed by: INTERNAL MEDICINE

## 2023-02-14 PROCEDURE — 45385 COLONOSCOPY W/LESION REMOVAL: CPT | Mod: PT | Performed by: INTERNAL MEDICINE

## 2023-02-14 PROCEDURE — 45385 PR COLONOSCOPY,REMV LESN,SNARE: ICD-10-PCS | Mod: 33,,, | Performed by: INTERNAL MEDICINE

## 2023-02-14 PROCEDURE — 37000009 HC ANESTHESIA EA ADD 15 MINS: Performed by: INTERNAL MEDICINE

## 2023-02-14 PROCEDURE — 27201089 HC SNARE, DISP (ANY): Performed by: INTERNAL MEDICINE

## 2023-02-14 PROCEDURE — 88305 TISSUE EXAM BY PATHOLOGIST: CPT | Performed by: STUDENT IN AN ORGANIZED HEALTH CARE EDUCATION/TRAINING PROGRAM

## 2023-02-14 PROCEDURE — 45385 COLONOSCOPY W/LESION REMOVAL: CPT | Mod: 33,,, | Performed by: INTERNAL MEDICINE

## 2023-02-14 PROCEDURE — 25000003 PHARM REV CODE 250: Performed by: NURSE ANESTHETIST, CERTIFIED REGISTERED

## 2023-02-14 PROCEDURE — E9220 PRA ENDO ANESTHESIA: ICD-10-PCS | Mod: 33,,, | Performed by: NURSE ANESTHETIST, CERTIFIED REGISTERED

## 2023-02-14 PROCEDURE — 88305 TISSUE EXAM BY PATHOLOGIST: ICD-10-PCS | Mod: 26,,, | Performed by: STUDENT IN AN ORGANIZED HEALTH CARE EDUCATION/TRAINING PROGRAM

## 2023-02-14 PROCEDURE — 88305 TISSUE EXAM BY PATHOLOGIST: CPT | Mod: 26,,, | Performed by: STUDENT IN AN ORGANIZED HEALTH CARE EDUCATION/TRAINING PROGRAM

## 2023-02-14 PROCEDURE — 63600175 PHARM REV CODE 636 W HCPCS: Performed by: NURSE ANESTHETIST, CERTIFIED REGISTERED

## 2023-02-14 PROCEDURE — E9220 PRA ENDO ANESTHESIA: HCPCS | Mod: 33,,, | Performed by: NURSE ANESTHETIST, CERTIFIED REGISTERED

## 2023-02-14 RX ORDER — SODIUM CHLORIDE 9 MG/ML
INJECTION, SOLUTION INTRAVENOUS CONTINUOUS
Status: DISCONTINUED | OUTPATIENT
Start: 2023-02-14 | End: 2023-02-14 | Stop reason: HOSPADM

## 2023-02-14 RX ORDER — LIDOCAINE HYDROCHLORIDE 20 MG/ML
INJECTION INTRAVENOUS
Status: DISCONTINUED | OUTPATIENT
Start: 2023-02-14 | End: 2023-02-14

## 2023-02-14 RX ORDER — PROPOFOL 10 MG/ML
VIAL (ML) INTRAVENOUS
Status: DISCONTINUED | OUTPATIENT
Start: 2023-02-14 | End: 2023-02-14

## 2023-02-14 RX ORDER — PROPOFOL 10 MG/ML
VIAL (ML) INTRAVENOUS CONTINUOUS PRN
Status: DISCONTINUED | OUTPATIENT
Start: 2023-02-14 | End: 2023-02-14

## 2023-02-14 RX ADMIN — SODIUM CHLORIDE: 0.9 INJECTION, SOLUTION INTRAVENOUS at 01:02

## 2023-02-14 RX ADMIN — LIDOCAINE HYDROCHLORIDE 50 MG: 20 INJECTION INTRAVENOUS at 01:02

## 2023-02-14 RX ADMIN — Medication 160 MCG/KG/MIN: at 01:02

## 2023-02-14 RX ADMIN — PROPOFOL 100 MG: 10 INJECTION, EMULSION INTRAVENOUS at 01:02

## 2023-02-14 NOTE — PLAN OF CARE
Reviewed plan of care with patient, safety wrist bands applied, all personal belongings placed underneath the stretcher. Pt voices no concerns at this time.

## 2023-02-14 NOTE — ANESTHESIA POSTPROCEDURE EVALUATION
Anesthesia Post Evaluation    Patient: Albertina Sim    Procedure(s) Performed: Procedure(s) (LRB):  COLONOSCOPY (N/A)    Final Anesthesia Type: general      Patient location during evaluation: PACU  Patient participation: Yes- Able to Participate  Level of consciousness: awake and alert  Post-procedure vital signs: reviewed and stable  Pain management: adequate  Airway patency: patent    PONV status at discharge: No PONV  Anesthetic complications: no      Cardiovascular status: blood pressure returned to baseline  Respiratory status: unassisted  Hydration status: euvolemic  Follow-up not needed.          Vitals Value Taken Time   /81 02/14/23 1452   Temp 36.5 °C (97.7 °F) 02/14/23 1422   Pulse 61 02/14/23 1452   Resp 16 02/14/23 1452   SpO2 99 % 02/14/23 1452         Event Time   Out of Recovery 15:00:24         Pain/Joshua Score: Joshua Score: 10 (2/14/2023  2:52 PM)

## 2023-02-14 NOTE — PROVATION PATIENT INSTRUCTIONS
Discharge Summary/Instructions after an Endoscopic Procedure  Patient Name: Albertina Sim  Patient MRN: [id number]  Patient YOB: 1960 Tuesday, February 14, 2023  Alessandro Cedñeo MD  Dear patient,  As a result of recent federal legislation (The Federal Cures Act), you may   receive lab or pathology results from your procedure in your [facility]   account before your physician is able to contact you. Your physician or   their representative will relay the results to you with their   recommendations at their soonest availability.  Thank you,  RESTRICTIONS:  During your procedure today, you received medications for sedation.  These   medications may affect your judgment, balance and coordination.  Therefore,   for 24 hours, you have the following restrictions:   - DO NOT drive a car, operate machinery, make legal/financial decisions,   sign important papers or drink alcohol.    ACTIVITY:  Today: no heavy lifting, straining or running due to procedural   sedation/anesthesia.  The following day: return to full activity including work.  DIET:  Eat and drink normally unless instructed otherwise.     TREATMENT FOR COMMON SIDE EFFECTS:  - Mild abdominal pain, nausea, belching, bloating or excessive gas:  rest,   eat lightly and use a heating pad.  - Sore Throat: treat with throat lozenges and/or gargle with warm salt   water.  - Because air was used during the procedure, expelling large amounts of air   from your rectum or belching is normal.  - If a bowel prep was taken, you may not have a bowel movement for 1-3 days.    This is normal.  SYMPTOMS TO WATCH FOR AND REPORT TO YOUR PHYSICIAN:  1. Abdominal pain or bloating, other than gas cramps.  2. Chest pain.  3. Back pain.  4. Signs of infection such as: chills or fever occurring within 24 hours   after the procedure.  5. Rectal bleeding, which would show as bright red, maroon, or black stools.   (A tablespoon of blood from the rectum is not serious, especially  if   hemorrhoids are present.)  6. Vomiting.  7. Weakness or dizziness.  GO DIRECTLY TO THE NEAREST EMERGENCY ROOM IF YOU HAVE ANY OF THE FOLLOWING:      Difficulty breathing              Chills and/or fever over 101 F   Persistent vomiting and/or vomiting blood   Severe abdominal pain   Severe chest pain   Black, tarry stools   Bleeding- more than one tablespoon   Any other symptom or condition that you feel may need urgent attention  Your doctor recommends these additional instructions:  If any biopsies were taken, your doctors clinic will contact you in 1 to 2   weeks with any results.  - Patient has a contact number available for emergencies.  The signs and   symptoms of potential delayed complications were discussed with the   patient.  Return to normal activities tomorrow.  Written discharge   instructions were provided to the patient.   - Discharge patient to home.   - Resume previous diet.   - Continue present medications.   - Await pathology results.   - Repeat colonoscopy in 5 years for surveillance.   For questions, problems or results please call your physician - Alessandro Cedeño MD at Work:  [phone].  [facility], EMERGENCY ROOM PHONE NUMBER: [phone]  IF A COMPLICATION OR EMERGENCY SITUATION ARISES AND YOU ARE UNABLE TO REACH   YOUR PHYSICIAN - GO DIRECTLY TO THE EMERGENCY ROOM.  Alessandro Cedeño MD  2/14/2023 2:19:49 PM  This report has been verified and signed electronically.  Dear patient,  As a result of recent federal legislation (The Federal Cures Act), you may   receive lab or pathology results from your procedure in your [facility]   account before your physician is able to contact you. Your physician or   their representative will relay the results to you with their   recommendations at their soonest availability.  Thank you,  PROVATION

## 2023-02-14 NOTE — H&P
Short Stay Endoscopy History and Physical    PCP - Anahi Nuñez MD    Procedure - Colonoscopy  Sedation: GA  ASA - per anesthesia  Mallampati - per anesthesia  History of Anesthesia problems - no  Family history Anesthesia problems -  no     HPI:  This is a 63 y.o. female here for evaluation of : Colon Cancer Screening    Reflux - no  Dysphagia - no  Abdominal pain - no  Diarrhea - no    ROS:  Constitutional: No fevers, chills, No weight loss  ENT: No allergies  CV: No chest pain  Pulm: No cough, No shortness of breath  Ophtho: No vision changes  GI: see HPI  Medical History:  has a past medical history of Arthritis, Sickle cell trait, and Vitamin D deficiency.    Surgical History:  has no past surgical history on file.    Family History: family history includes Atrial fibrillation in her mother; Cancer in her father; Glaucoma in her father; Hypertension in her mother; Stroke in her brother.. Otherwise no colon cancer, inflammatory bowel disease, or GI malignancies.    Social History:  reports that she has never smoked. She has never used smokeless tobacco. She reports that she does not currently use alcohol. She reports that she does not use drugs.    Review of patient's allergies indicates:   Allergen Reactions    No known drug allergies        Medications:   Medications Prior to Admission   Medication Sig Dispense Refill Last Dose    cholecalciferol, vitamin D3, (VITAMIN D3) 2,000 unit Cap Take 1 capsule (2,000 Units total) by mouth once daily.   Past Week    hepatitis B vacc-CpG 1018, PF, (HEPLISAV-B, PF,) 20 mcg/0.5 mL Syrg inject as directed 0.5 mL 0 Past Month    multivitamin (THERAGRAN) per tablet Take 1 tablet by mouth once daily.   Past Week    naproxen sodium (ANAPROX) 220 MG tablet Take 220 mg by mouth every 12 (twelve) hours.   Past Week       Objective Findings:    Vital Signs: Per nursing notes.    Physical Exam:  General Appearance: Well appearing in no acute distress  Head:   Normocephalic,  without obvious abnormality  Eyes:    No scleral icterus  Airway: Open  Neck: No restriction in mobility  Lungs: CTA bilaterally in anterior and posterior fields, no wheezes, no crackles.  Heart:  Regular rate and rhythm, S1, S2 normal, no murmurs heard  Abdomen: Soft, non tender, non distended      Labs:  Lab Results   Component Value Date    WBC 6.42 06/01/2022    HGB 11.0 (L) 06/01/2022    HCT 34.6 (L) 06/01/2022     06/01/2022    CHOL 200 (H) 06/01/2022    TRIG 55 06/01/2022    HDL 77 (H) 06/01/2022    ALT 16 06/01/2022    AST 24 06/01/2022     06/01/2022    K 4.5 06/01/2022     06/01/2022    CREATININE 1.0 06/01/2022    BUN 13 06/01/2022    CO2 28 06/01/2022    TSH 2.130 06/01/2022    HGBA1C 5.3 06/01/2022         I have explained the risks and benefits of endoscopy procedures to the patient including but not limited to bleeding, perforation, infection, and death.    Thank you so much for allowing me to participate in the care of Albertina Roney Cedeño MD

## 2023-02-14 NOTE — ANESTHESIA PREPROCEDURE EVALUATION
[facility]  Anesthesia Pre-Operative Evaluation       Patient Name: Albertina Sim  YOB: 1960  MRN: [id number]  [facility]: [id number]      Code Status: No Order   Date of Procedure: 2/14/2023  Anesthesia: Choice Procedure: Procedure(s) (LRB):  COLONOSCOPY (N/A)  Pre-Operative Diagnosis: Encounter for screening for malignant neoplasm of colon [Z12.11]  Proceduralist: Surgeon(s) and Role:     * Alessandro Cedeño MD - Primary Nurse: (Unknown)      SUBJECTIVE:   Albertina Sim is a 63 y.o. female who  has a past medical history of Arthritis, Sickle cell trait, and Vitamin D deficiency. No notes on file    bmi 37    No current facility-administered medications for this encounter.       she is not on any long-term medications.   ALLERGIES:     Review of patient's allergies indicates:   Allergen Reactions    No known drug allergies      LDA:      Lines/Drains/Airways     None               MEDICATIONS:     Antibiotics (From admission, onward)    None        VTE Risk Mitigation (From admission, onward)    None        No current facility-administered medications for this encounter.     Current Outpatient Medications   Medication Sig Dispense Refill    cholecalciferol, vitamin D3, (VITAMIN D3) 2,000 unit Cap Take 1 capsule (2,000 Units total) by mouth once daily.      hepatitis B vacc-CpG 1018, PF, (HEPLISAV-B, PF,) 20 mcg/0.5 mL Syrg inject as directed 0.5 mL 0    multivitamin (THERAGRAN) per tablet Take 1 tablet by mouth once daily.      naproxen sodium (ANAPROX) 220 MG tablet Take 220 mg by mouth every 12 (twelve) hours.            History:   There are no hospital problems to display for this patient.    Surgical History:    has no past surgical history on file.   Social History:    has no history on file for sexual activity.  reports that she has never smoked. She has never used smokeless tobacco. She reports current alcohol use. She reports that she does not use drugs.     OBJECTIVE:     Vital  Signs (Most Recent):    Vital Signs Range (Last 24H):          There is no height or weight on file to calculate BMI.   Wt Readings from Last 4 Encounters:   01/26/23 106.6 kg (235 lb)   09/27/22 102.5 kg (225 lb 15.5 oz)   06/01/22 106.6 kg (235 lb)   01/18/22 104.3 kg (230 lb)     Significant Labs:  Lab Results   Component Value Date    WBC 6.42 06/01/2022    HGB 11.0 (L) 06/01/2022    HCT 34.6 (L) 06/01/2022     06/01/2022     06/01/2022    K 4.5 06/01/2022     06/01/2022    CREATININE 1.0 06/01/2022    BUN 13 06/01/2022    CO2 28 06/01/2022    GLU 90 06/01/2022    CALCIUM 9.9 06/01/2022    MG 2.2 01/30/2019    ALKPHOS 82 06/01/2022    ALT 16 06/01/2022    AST 24 06/01/2022    ALBUMIN 3.9 06/01/2022    HGBA1C 5.3 06/01/2022     Patient's last menstrual period was 01/01/2006 (approximate).  No results found for this or any previous visit (from the past 72 hour(s)).    EKG:   Results for orders placed or performed in visit on 02/09/12   EKG 12-LEAD    Collection Time: 02/09/12  8:35 AM    Narrative    Test Reason : 786.50  Blood Pressure : * mmHG  Vent. Rate : 072 BPM     Atrial Rate : 072 BPM     P-R Int : 146 ms          QRS Dur : 080 ms      QT Int : 386 ms       P-R-T Axes : 016 -05 008 degrees     QTc Int : 422 ms    Normal sinus rhythm  Normal ECG  No previous ECGs available  Confirmed by AMADO BRYAN MD ([id number]) on 2/9/2012 9:05:25 AM    Referred By:  GOMEZ           Overread By: AMADO BRYAN MD       TTE:  No results found for this or any previous visit.  No results found for: EF   No results found for this or any previous visit.  MARGARET:  No results found for this or any previous visit.  Stress Test:  No results found for this or any previous visit.     LHC:  No results found for this or any previous visit.     PFT:  No results found for: FEV1, FVC, THN6OLY, TLC, DLCO   ASSESSMENT/PLAN:         Pre-op Assessment    I have reviewed the Patient Summary Reports.     I have reviewed the  Nursing Notes.    I have reviewed the Medications.     Review of Systems  Anesthesia Hx:  No problems with previous Anesthesia    Hematology/Oncology:  Hematology Normal   Oncology Normal     EENT/Dental:EENT/Dental Normal   Cardiovascular:  Cardiovascular Normal Exercise tolerance: good     Pulmonary:  Pulmonary Normal    Renal/:  Renal/ Normal     Hepatic/GI:  Hepatic/GI Normal    Musculoskeletal:   Arthritis     Neurological:   Neuromuscular Disease,    Endocrine:  Endocrine Normal    Dermatological:  Skin Normal    Psych:  Psychiatric Normal           Physical Exam  General: Well nourished    Airway:  Mallampati: III / II  Mouth Opening: Normal  TM Distance: Normal  Tongue: Normal  Neck ROM: Normal ROM    Dental:  Intact        Anesthesia Plan  Type of Anesthesia, risks & benefits discussed:    Anesthesia Type: Gen ETT, Gen Natural Airway  Intra-op Monitoring Plan: Standard ASA Monitors  Induction:  IV  Informed Consent: Informed consent signed with the Patient and all parties understand the risks and agree with anesthesia plan.  All questions answered.   ASA Score: 3    Ready For Surgery From Anesthesia Perspective.     .

## 2023-02-14 NOTE — TRANSFER OF CARE
"Anesthesia Transfer of Care Note    Patient: Albertina Sim    Procedure(s) Performed: Procedure(s) (LRB):  COLONOSCOPY (N/A)    Patient location: GI    Anesthesia Type: general    Transport from OR: Transported from OR on room air with adequate spontaneous ventilation    Post pain: adequate analgesia    Post assessment: no apparent anesthetic complications    Post vital signs: stable    Level of consciousness: awake    Nausea/Vomiting: no nausea/vomiting    Complications: none    Transfer of care protocol was followed      Last vitals:   Visit Vitals  /72 (BP Location: Left arm, Patient Position: Lying)   Pulse 78   Temp 36.7 °C (98.1 °F) (Oral)   Resp 20   Ht 5' 6" (1.676 m)   Wt 104.3 kg (230 lb)   LMP 01/01/2006 (Approximate)   SpO2 100%   Breastfeeding No   BMI 37.12 kg/m²     "

## 2023-02-20 ENCOUNTER — TELEPHONE (OUTPATIENT)
Dept: ENDOSCOPY | Facility: HOSPITAL | Age: 63
End: 2023-02-20
Payer: COMMERCIAL

## 2023-02-20 LAB
FINAL PATHOLOGIC DIAGNOSIS: NORMAL
GROSS: NORMAL
Lab: NORMAL
MICROSCOPIC EXAM: NORMAL

## 2023-02-20 NOTE — TELEPHONE ENCOUNTER
----- Message from Alessandro Cedeño MD sent at 2/20/2023 11:38 AM CST -----  Please notify patient, the colon polyp was benign.

## 2023-03-06 NOTE — PROGRESS NOTES
"Subjective:       Patient ID: Albertina Sim is a 63 y.o. female.    Chief Complaint: Chest Pain (Clustered feeling -- dwn to left arm started 2 wks ago) and Immunizations (Shingles )    Former pt of Dr. Nuñez, here for, "Discuss getting EKG and shingles shot"    Last annual was 6-2-23, will be establishing and transferring care to Dr. Roldan in July    Has  been having chest pain radiating down arm. Concerned and wants an EKG. States both her brother and Mother have Afib, brother recently had a stroke last year.    Her last baseline EKG was 2012 and NSR.     Chest Pain   This is a recurrent problem. The current episode started 1 to 4 weeks ago (off and on last 2 weeks). The problem has been waxing and waning. The pain is present in the substernal region. The quality of the pain is described as pressure. The pain radiates to the left arm. Associated symptoms include exertional chest pressure and shortness of breath. Pertinent negatives include no claudication, diaphoresis, dizziness, fever, irregular heartbeat, lower extremity edema, nausea, near-syncope, numbness, palpitations, syncope or weakness. The pain is aggravated by nothing. She has tried nothing for the symptoms. The treatment provided no relief. Risk factors include obesity.   Her past medical history is significant for sickle cell disease.   Her family medical history is significant for heart disease and stroke.   Review of Systems   Constitutional:  Negative for activity change, appetite change, chills, diaphoresis, fatigue, fever and unexpected weight change.   Respiratory:  Positive for shortness of breath.    Cardiovascular:  Positive for chest pain. Negative for palpitations, claudication, syncope and near-syncope.        As documented in HPI     Gastrointestinal:  Negative for nausea.   Genitourinary:  Negative for dysuria.   Allergic/Immunologic: Negative for environmental allergies, food allergies and immunocompromised state.   Neurological:  " Negative for dizziness, syncope, weakness and numbness.   Psychiatric/Behavioral:  Negative for suicidal ideas.        Review of patient's allergies indicates:   Allergen Reactions    No known drug allergies       Current Outpatient Medications:     cholecalciferol, vitamin D3, (VITAMIN D3) 2,000 unit Cap, Take 1 capsule (2,000 Units total) by mouth once daily., Disp: , Rfl:     multivitamin (THERAGRAN) per tablet, Take 1 tablet by mouth once daily., Disp: , Rfl:     naproxen sodium (ANAPROX) 220 MG tablet, Take 220 mg by mouth every 12 (twelve) hours., Disp: , Rfl:      Patient Active Problem List   Diagnosis    Hallux valgus (acquired)    DJD (degenerative joint disease) of knee    Sciatica of right side    Anemia, sickle trait, documented since 2005    Sickle cell trait    Morbid obesity    Chronic midline low back pain without sciatica    Primary osteoarthritis of left knee    Chronic pain of left knee    Vitamin D deficiency    Nocturnal leg cramps    Screen for colon cancer, next 03/2022      Past Medical History:   Diagnosis Date    Arthritis     Sickle cell trait     Vitamin D deficiency       Past Surgical History:   Procedure Laterality Date    COLONOSCOPY N/A 2/14/2023    Procedure: COLONOSCOPY;  Surgeon: Alessandro Cedeño MD;  Location: [facility] ([address] [place] [address]);  Service: Endoscopy;  Laterality: N/A;  instr emailed-ml  2/7/23 pre call amalia - marychuy      Social History     Socioeconomic History    Marital status: Single   Occupational History     Employer: [employer]   Tobacco Use    Smoking status: Never    Smokeless tobacco: Never   Substance and Sexual Activity    Alcohol use: Not Currently     Comment: rare    Drug use: No    Sexual activity: Not Currently   Social History Narrative    At [employer], 10 years in May. Really enjoys her employment.  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    Before the [employer] in .  [place]. For 15  years, really tied her down, got washed out by [employer]    Both parents passed    Stopped riding bike to work this summer b/o heat    Job stressed, several people left dept.    She walks her Syriac dog twice daily.    She's doing yoga and enjoying the relaxation.    Single, no children.    Youngest in her family of origin. Grieving the death of her sister who  of a brain tumor, at the age of 64.    She was a twin and her twin sister, identical twin sister and now has memory problems.        No current physical activity.     Social Determinants of Health     Financial Resource Strain: Low Risk     Difficulty of Paying Living Expenses: Not very hard   Food Insecurity: No Food Insecurity    Worried About Running Out of Food in the Last Year: Never true    Ran Out of Food in the Last Year: Never true   Transportation Needs: No Transportation Needs    Lack of Transportation (Medical): No    Lack of Transportation (Non-Medical): No   Physical Activity: Inactive    Days of Exercise per Week: 0 days    Minutes of Exercise per Session: 0 min   Stress: No Stress Concern Present    Feeling of Stress : Not at all   Social Connections: Unknown    Frequency of Communication with Friends and Family: More than three times a week    Frequency of Social Gatherings with Friends and Family: More than three times a week    Active Member of Clubs or Organizations: Yes    Attends Club or Organization Meetings: More than 4 times per year    Marital Status: Never    Housing Stability: Unknown    Unable to Pay for Housing in the Last Year: No    Unstable Housing in the Last Year: No      Family History   Problem Relation Age of Onset    Atrial fibrillation Mother         dementia, 91    Hypertension Mother     Arthritis Mother     Heart disease Mother     Cancer Father         lung, 90    Glaucoma Father     Stroke Brother     Cancer Sister     Cancer Brother     Stroke Brother       Objective:      Vitals:    23 1527   BP: (!)  "143/83   Pulse: 70   Resp: 16   SpO2: (!) 94%   Weight: 107.9 kg (237 lb 14 oz)   Height: 5' 6" (1.676 m)   PainSc: 0-No pain      Body mass index is 38.39 kg/m².    Physical Exam  Vitals and nursing note reviewed.   Constitutional:       Appearance: She is obese.   HENT:      Head: Normocephalic.      Nose: Nose normal.      Mouth/Throat:      Mouth: Mucous membranes are moist.   Eyes:      Conjunctiva/sclera: Conjunctivae normal.   Cardiovascular:      Rate and Rhythm: Normal rate and regular rhythm.      Pulses: Normal pulses.      Heart sounds: Normal heart sounds. No murmur heard.    No gallop.   Pulmonary:      Effort: Pulmonary effort is normal.      Breath sounds: Normal breath sounds.   Musculoskeletal:         General: Normal range of motion.      Cervical back: Normal range of motion.   Skin:     General: Skin is warm and dry.   Neurological:      General: No focal deficit present.      Mental Status: She is alert and oriented to person, place, and time.   Psychiatric:         Mood and Affect: Mood normal.         Behavior: Behavior normal.         Thought Content: Thought content normal.         Judgment: Judgment normal.       Assessment:       Problem List Items Addressed This Visit    None  Visit Diagnoses       Chest pain in adult    -  Primary    Relevant Orders    EKG 12-lead    Refer to Emergency Dept.    ST segment abnormality        Relevant Orders    Refer to Emergency Dept.    BMI 38.0-38.9,adult        Obesity (BMI 35.0-39.9 without comorbidity)                Plan:       Albertina was seen today for chest pain and immunizations.    Diagnoses and all orders for this visit:    Chest pain in adult  -     EKG 12-lead  -     Refer to Emergency Dept.    ST segment abnormality  -     Refer to Emergency Dept.    BMI 38.0-38.9,adult  BMI reviewed    Obesity (BMI 35.0-39.9 without comorbidity)  BMI reviewed.    Diet and exercise to lose weight.    Referral to ER due to ST changes on EKG    Report given " at 359pm to Darin charge nurse at ER    Transported and escorted by Yulisa Rice RN to ER    Follow up if symptoms worsen or fail to improve.

## 2023-03-07 ENCOUNTER — HOSPITAL ENCOUNTER (EMERGENCY)
Facility: HOSPITAL | Age: 63
Discharge: HOME | End: 2023-03-07
Attending: EMERGENCY MEDICINE
Payer: COMMERCIAL

## 2023-03-07 ENCOUNTER — OFFICE VISIT (OUTPATIENT)
Dept: INTERNAL MEDICINE | Facility: CLINIC | Age: 63
End: 2023-03-07
Payer: MEDICARE

## 2023-03-07 VITALS
DIASTOLIC BLOOD PRESSURE: 83 MMHG | SYSTOLIC BLOOD PRESSURE: 145 MMHG | BODY MASS INDEX: 37.77 KG/M2 | RESPIRATION RATE: 14 BRPM | TEMPERATURE: 98 F | HEART RATE: 65 BPM | WEIGHT: 235 LBS | OXYGEN SATURATION: 100 % | HEIGHT: 66 IN

## 2023-03-07 VITALS
BODY MASS INDEX: 38.23 KG/M2 | SYSTOLIC BLOOD PRESSURE: 143 MMHG | HEART RATE: 70 BPM | WEIGHT: 237.88 LBS | DIASTOLIC BLOOD PRESSURE: 83 MMHG | RESPIRATION RATE: 16 BRPM | OXYGEN SATURATION: 94 % | HEIGHT: 66 IN

## 2023-03-07 DIAGNOSIS — R94.31 ST SEGMENT ABNORMALITY: ICD-10-CM

## 2023-03-07 DIAGNOSIS — R07.9 CHEST PAIN: Primary | ICD-10-CM

## 2023-03-07 DIAGNOSIS — E66.9 OBESITY (BMI 35.0-39.9 WITHOUT COMORBIDITY): ICD-10-CM

## 2023-03-07 DIAGNOSIS — R07.9 CHEST PAIN IN ADULT: Primary | ICD-10-CM

## 2023-03-07 DIAGNOSIS — R03.0 ELEVATED BLOOD PRESSURE READING: ICD-10-CM

## 2023-03-07 LAB
ALBUMIN SERPL BCP-MCNC: 4.1 G/DL (ref 3.5–5.2)
ALP SERPL-CCNC: 97 U/L (ref 55–135)
ALT SERPL W/O P-5'-P-CCNC: 20 U/L (ref 10–44)
ANION GAP SERPL CALC-SCNC: 8 MMOL/L (ref 8–16)
AST SERPL-CCNC: 37 U/L (ref 10–40)
BASOPHILS # BLD AUTO: 0.04 K/UL (ref 0–0.2)
BASOPHILS NFR BLD: 0.5 % (ref 0–1.9)
BILIRUB SERPL-MCNC: 0.5 MG/DL (ref 0.1–1)
BNP SERPL-MCNC: 53 PG/ML (ref 0–99)
BUN SERPL-MCNC: 14 MG/DL (ref 8–23)
CALCIUM SERPL-MCNC: 10.1 MG/DL (ref 8.7–10.5)
CHLORIDE SERPL-SCNC: 109 MMOL/L (ref 95–110)
CO2 SERPL-SCNC: 24 MMOL/L (ref 23–29)
CREAT SERPL-MCNC: 1.2 MG/DL (ref 0.5–1.4)
DIFFERENTIAL METHOD: ABNORMAL
EOSINOPHIL # BLD AUTO: 0.2 K/UL (ref 0–0.5)
EOSINOPHIL NFR BLD: 2.4 % (ref 0–8)
ERYTHROCYTE [DISTWIDTH] IN BLOOD BY AUTOMATED COUNT: 14 % (ref 11.5–14.5)
EST. GFR  (NO RACE VARIABLE): 50.2 ML/MIN/1.73 M^2
GLUCOSE SERPL-MCNC: 87 MG/DL (ref 70–110)
HCT VFR BLD AUTO: 35 % (ref 37–48.5)
HCV AB SERPL QL IA: NORMAL
HGB BLD-MCNC: 11.5 G/DL (ref 12–16)
HIV 1+2 AB+HIV1 P24 AG SERPL QL IA: NORMAL
IMM GRANULOCYTES # BLD AUTO: 0.02 K/UL (ref 0–0.04)
IMM GRANULOCYTES NFR BLD AUTO: 0.3 % (ref 0–0.5)
LYMPHOCYTES # BLD AUTO: 2.3 K/UL (ref 1–4.8)
LYMPHOCYTES NFR BLD: 29.5 % (ref 18–48)
MCH RBC QN AUTO: 28.9 PG (ref 27–31)
MCHC RBC AUTO-ENTMCNC: 32.9 G/DL (ref 32–36)
MCV RBC AUTO: 88 FL (ref 82–98)
MONOCYTES # BLD AUTO: 0.7 K/UL (ref 0.3–1)
MONOCYTES NFR BLD: 8.4 % (ref 4–15)
NEUTROPHILS # BLD AUTO: 4.7 K/UL (ref 1.8–7.7)
NEUTROPHILS NFR BLD: 58.9 % (ref 38–73)
NRBC BLD-RTO: 0 /100 WBC
PLATELET # BLD AUTO: 231 K/UL (ref 150–450)
PMV BLD AUTO: 9.8 FL (ref 9.2–12.9)
POTASSIUM SERPL-SCNC: 4.4 MMOL/L (ref 3.5–5.1)
PROT SERPL-MCNC: 8 G/DL (ref 6–8.4)
RBC # BLD AUTO: 3.98 M/UL (ref 4–5.4)
SODIUM SERPL-SCNC: 141 MMOL/L (ref 136–145)
TROPONIN I SERPL DL<=0.01 NG/ML-MCNC: <0.006 NG/ML (ref 0–0.03)
WBC # BLD AUTO: 7.94 K/UL (ref 3.9–12.7)

## 2023-03-07 PROCEDURE — 1159F MED LIST DOCD IN RCRD: CPT | Mod: CPTII,S$GLB,, | Performed by: NURSE PRACTITIONER

## 2023-03-07 PROCEDURE — 1159F PR MEDICATION LIST DOCUMENTED IN MEDICAL RECORD: ICD-10-PCS | Mod: CPTII,S$GLB,, | Performed by: NURSE PRACTITIONER

## 2023-03-07 PROCEDURE — 86803 HEPATITIS C AB TEST: CPT | Performed by: PHYSICIAN ASSISTANT

## 2023-03-07 PROCEDURE — 93005 ELECTROCARDIOGRAM TRACING: CPT | Mod: S$GLB,,, | Performed by: NURSE PRACTITIONER

## 2023-03-07 PROCEDURE — 99284 PR EMERGENCY DEPT VISIT,LEVEL IV: ICD-10-PCS | Mod: ,,, | Performed by: PHYSICIAN ASSISTANT

## 2023-03-07 PROCEDURE — 99214 OFFICE O/P EST MOD 30 MIN: CPT | Mod: S$GLB,,, | Performed by: NURSE PRACTITIONER

## 2023-03-07 PROCEDURE — 3077F SYST BP >= 140 MM HG: CPT | Mod: CPTII,S$GLB,, | Performed by: NURSE PRACTITIONER

## 2023-03-07 PROCEDURE — 93005 EKG 12-LEAD: ICD-10-PCS | Mod: S$GLB,,, | Performed by: NURSE PRACTITIONER

## 2023-03-07 PROCEDURE — 1160F PR REVIEW ALL MEDS BY PRESCRIBER/CLIN PHARMACIST DOCUMENTED: ICD-10-PCS | Mod: CPTII,S$GLB,, | Performed by: NURSE PRACTITIONER

## 2023-03-07 PROCEDURE — 93010 EKG 12-LEAD: ICD-10-PCS | Mod: S$GLB,,, | Performed by: INTERNAL MEDICINE

## 2023-03-07 PROCEDURE — 83880 ASSAY OF NATRIURETIC PEPTIDE: CPT | Performed by: PHYSICIAN ASSISTANT

## 2023-03-07 PROCEDURE — 93010 ELECTROCARDIOGRAM REPORT: CPT | Mod: S$GLB,,, | Performed by: INTERNAL MEDICINE

## 2023-03-07 PROCEDURE — 3288F PR FALLS RISK ASSESSMENT DOCUMENTED: ICD-10-PCS | Mod: CPTII,S$GLB,, | Performed by: NURSE PRACTITIONER

## 2023-03-07 PROCEDURE — 84484 ASSAY OF TROPONIN QUANT: CPT | Performed by: PHYSICIAN ASSISTANT

## 2023-03-07 PROCEDURE — 99999 PR PBB SHADOW E&M-EST. PATIENT-LVL III: ICD-10-PCS | Mod: PBBFAC,,, | Performed by: NURSE PRACTITIONER

## 2023-03-07 PROCEDURE — 3079F PR MOST RECENT DIASTOLIC BLOOD PRESSURE 80-89 MM HG: ICD-10-PCS | Mod: CPTII,S$GLB,, | Performed by: NURSE PRACTITIONER

## 2023-03-07 PROCEDURE — 1160F RVW MEDS BY RX/DR IN RCRD: CPT | Mod: CPTII,S$GLB,, | Performed by: NURSE PRACTITIONER

## 2023-03-07 PROCEDURE — 99284 EMERGENCY DEPT VISIT MOD MDM: CPT | Mod: ,,, | Performed by: PHYSICIAN ASSISTANT

## 2023-03-07 PROCEDURE — 99285 EMERGENCY DEPT VISIT HI MDM: CPT | Mod: 25

## 2023-03-07 PROCEDURE — 3008F BODY MASS INDEX DOCD: CPT | Mod: CPTII,S$GLB,, | Performed by: NURSE PRACTITIONER

## 2023-03-07 PROCEDURE — 85025 COMPLETE CBC W/AUTO DIFF WBC: CPT | Performed by: PHYSICIAN ASSISTANT

## 2023-03-07 PROCEDURE — 3079F DIAST BP 80-89 MM HG: CPT | Mod: CPTII,S$GLB,, | Performed by: NURSE PRACTITIONER

## 2023-03-07 PROCEDURE — 99214 PR OFFICE/OUTPT VISIT, EST, LEVL IV, 30-39 MIN: ICD-10-PCS | Mod: S$GLB,,, | Performed by: NURSE PRACTITIONER

## 2023-03-07 PROCEDURE — 25000003 PHARM REV CODE 250: Performed by: PHYSICIAN ASSISTANT

## 2023-03-07 PROCEDURE — 3008F PR BODY MASS INDEX (BMI) DOCUMENTED: ICD-10-PCS | Mod: CPTII,S$GLB,, | Performed by: NURSE PRACTITIONER

## 2023-03-07 PROCEDURE — 80053 COMPREHEN METABOLIC PANEL: CPT | Performed by: PHYSICIAN ASSISTANT

## 2023-03-07 PROCEDURE — 1100F PR PT FALLS ASSESS DOC 2+ FALLS/FALL W/INJURY/YR: ICD-10-PCS | Mod: CPTII,S$GLB,, | Performed by: NURSE PRACTITIONER

## 2023-03-07 PROCEDURE — 99999 PR PBB SHADOW E&M-EST. PATIENT-LVL III: CPT | Mod: PBBFAC,,, | Performed by: NURSE PRACTITIONER

## 2023-03-07 PROCEDURE — 87389 HIV-1 AG W/HIV-1&-2 AB AG IA: CPT | Performed by: PHYSICIAN ASSISTANT

## 2023-03-07 PROCEDURE — 3288F FALL RISK ASSESSMENT DOCD: CPT | Mod: CPTII,S$GLB,, | Performed by: NURSE PRACTITIONER

## 2023-03-07 PROCEDURE — 3077F PR MOST RECENT SYSTOLIC BLOOD PRESSURE >= 140 MM HG: ICD-10-PCS | Mod: CPTII,S$GLB,, | Performed by: NURSE PRACTITIONER

## 2023-03-07 PROCEDURE — 1100F PTFALLS ASSESS-DOCD GE2>/YR: CPT | Mod: CPTII,S$GLB,, | Performed by: NURSE PRACTITIONER

## 2023-03-07 RX ORDER — ASPIRIN 325 MG
325 TABLET ORAL
Status: COMPLETED | OUTPATIENT
Start: 2023-03-07 | End: 2023-03-07

## 2023-03-07 RX ADMIN — ASPIRIN 325 MG ORAL TABLET 325 MG: 325 PILL ORAL at 06:03

## 2023-03-07 NOTE — ED NOTES
"Pt ambutatory to ED referred from NP for abnormal EKG results.  Pt endorses intermittent "pins and needles" sensation.  Pt endorses intermittent HA, chest congestion, productive cough. Pt denies SOB, states left substernal "strange feeling".  Denies vision change. Skin warm/dry, afebrile. Pt denies N/V, denies fever/chills, denies GI/ symptoms.    "

## 2023-03-07 NOTE — ED PROVIDER NOTES
Encounter Date: 3/7/2023       History     Chief Complaint   Patient presents with    Chest Pain     Chest pain 2 weeks ago, had ekg in clinic ??, denies cardiac hx     Patient is a 63-year-old female with no significant medical history who presents to the emergency department with atypical chest pain.  Patient reports over the last several months she is had intermittent sensations on the left side of her chest.  She reports sometimes it is pain in sometimes it is just a funny feeling.  She reports she is had some shortness of breath on exertion.  She reports she saw her PCP today and had an EKG.  They instructed her to come to the emergency department.  She is unsure exactly why.  She does report having a cough over the last week with no sputum production.  She denies lower extremity swelling.  She denies recent travel, recent surgery, previous blood clot, or exogenous estrogen.  She denies fevers or chills.  She denies any chest pain currently.    The history is provided by the patient.   Review of patient's allergies indicates:   Allergen Reactions    No known drug allergies      Past Medical History:   Diagnosis Date    Arthritis     Sickle cell trait     Vitamin D deficiency      Past Surgical History:   Procedure Laterality Date    COLONOSCOPY N/A 2/14/2023    Procedure: COLONOSCOPY;  Surgeon: Alessandro Cedeño MD;  Location: [facility] ([address] [place] [address]);  Service: Endoscopy;  Laterality: N/A;  instr emailed-ml  2/7/23 pre call amalia - marychuy     Family History   Problem Relation Age of Onset    Atrial fibrillation Mother         dementia, 91    Hypertension Mother     Arthritis Mother     Heart disease Mother     Cancer Father         lung, 90    Glaucoma Father     Stroke Brother     Cancer Sister     Cancer Brother     Stroke Brother      Social History     Tobacco Use    Smoking status: Never    Smokeless tobacco: Never   Substance Use Topics    Alcohol use: Not Currently     Comment: rare    Drug use: No      Review of Systems   Constitutional:  Negative for activity change, appetite change, chills, fatigue and fever.   HENT:  Negative for congestion, ear discharge, ear pain, nosebleeds, postnasal drip, sore throat and trouble swallowing.    Respiratory:  Positive for shortness of breath. Negative for cough.    Cardiovascular:  Positive for chest pain. Negative for palpitations and leg swelling.   Gastrointestinal:  Negative for abdominal pain, blood in stool, constipation, diarrhea, nausea and vomiting.   Genitourinary:  Negative for dysuria, flank pain and hematuria.   Musculoskeletal:  Negative for back pain, neck pain and neck stiffness.   Skin:  Negative for rash and wound.   Neurological:  Negative for dizziness, light-headedness and headaches.     Physical Exam     Initial Vitals [03/07/23 1625]   BP Pulse Resp Temp SpO2   (!) 195/106 64 18 98.2 °F (36.8 °C) 99 %      MAP       --         Physical Exam    Nursing note and vitals reviewed.  Constitutional: She appears well-developed and well-nourished. She is not diaphoretic.  Non-toxic appearance. No distress.   HENT:   Head: Normocephalic.   Right Ear: Hearing and external ear normal.   Left Ear: Hearing and external ear normal.   Nose: Nose normal.   Mouth/Throat: Uvula is midline and oropharynx is clear and moist. No oropharyngeal exudate.   Eyes: Conjunctivae are normal. Pupils are equal, round, and reactive to light.   Neck:   Normal range of motion.  Cardiovascular:  Normal rate and regular rhythm.           No lower extremity edema   Pulmonary/Chest: Breath sounds normal. No respiratory distress. She has no wheezes. She has no rhonchi. She has no rales. She exhibits no tenderness.   Abdominal: Abdomen is soft. Bowel sounds are normal. There is no abdominal tenderness.   Musculoskeletal:      Cervical back: Normal range of motion.     Lymphadenopathy:     She has no cervical adenopathy.   Neurological: She is alert and oriented to person, place, and  time. She has normal strength.   Skin: Skin is warm and dry. Capillary refill takes less than 2 seconds.   Psychiatric: She has a normal mood and affect.       ED Course   Procedures  Labs Reviewed   CBC W/ AUTO DIFFERENTIAL - Abnormal; Notable for the following components:       Result Value    RBC 3.98 (*)     Hemoglobin 11.5 (*)     Hematocrit 35.0 (*)     All other components within normal limits   COMPREHENSIVE METABOLIC PANEL - Abnormal; Notable for the following components:    eGFR 50.2 (*)     All other components within normal limits   HIV 1 / 2 ANTIBODY    Narrative:     Release to patient->Immediate   HEPATITIS C ANTIBODY    Narrative:     Release to patient->Immediate   TROPONIN I   B-TYPE NATRIURETIC PEPTIDE     EKG Readings: (Independently Interpreted)   Initial Reading: No STEMI. Rhythm: Normal Sinus Rhythm. Heart Rate: 66.     Imaging Results              X-Ray Chest AP Portable (Final result)  Result time 03/07/23 19:34:02      Final result by Iglesia Lopez MD (03/07/23 19:34:02)                   Impression:      No acute intrathoracic abnormality.    Electronically signed by resident: Sarkis Coates  Date:    03/07/2023  Time:    19:28    Electronically signed by: Iglesia Lopez MD  Date:    03/07/2023  Time:    19:34               Narrative:    EXAMINATION:  XR CHEST AP PORTABLE    CLINICAL HISTORY:  . Chest pain, unspecified    TECHNIQUE:  Single frontal portable view of the chest was performed.    COMPARISON:  None    FINDINGS:  Support devices: Cardiac monitoring leads overlie the chest.    The lungs are clear, with normal appearance of pulmonary vasculature and no pleural effusion or pneumothorax.  Suspected subcentimeter calcified granuloma in the medial right lower lung zone.    The cardiac silhouette is normal in size. Suspected slight tortuosity of the aorta.  Hilar contours are within normal limits.    Bones are intact.                                       Medications   aspirin  tablet 325 mg (325 mg Oral Given 3/7/23 5718)     Medical Decision Making:   Initial Assessment:   Urgent evaluation of a 63-year-old female who presents to the emergency department with atypical chest pain.  Patient has no coronary artery disease risk factors.  She is afebrile here.  She is hypertensive here.  She denies history of hypertension.  No lower extremity edema.  No chest pain currently.  Normal heart sounds.  Normal lung sounds.  EKG shows no acute ischemia.  With patient's presentation, not overly concerned for ACS.  Labs will be obtained.  Chest x-ray will be obtained.  Patient will be placed on cardiac monitor.  Given aspirin.  Not concerned for PE.  Clinical Tests:   Lab Tests: Ordered and Reviewed  Radiological Study: Ordered and Reviewed  ED Management:  7:38 PM  Labs reveal no significant abnormality.  Specifically troponin is undetectable.  BNP is normal.  Chest x-ray reveals no acute cardiopulmonary process.  Patient is not having any symptoms currently.  Has not had any symptoms while in the emergency department.  Will place a cardiology referral.  Patient's should follow-up outpatient for possible outpatient stress test.  Patient reports her blood pressure is never elevated.  She is advised to keep journal to present to her PCP.  She is advised to follow up with her PCP in the next week.  She is given strict return precautions.                        Clinical Impression:   Final diagnoses:  [R07.9] Chest pain (Primary)  [R03.0] Elevated blood pressure reading        ED Disposition Condition    Discharge Stable          ED Prescriptions    None       Follow-up Information       Follow up With Specialties Details Why Contact Info Additional Information    Anahi Nuñez MD Internal Medicine In 2 days for blood pressure check [address] [facility] [address]  [phone]       [facility] - Cardiology - [facility]   [address] [facility]  [address]  [phone] Cardiology Services Clinics - 3rd floor             Violeta Delacruz-DANDRE Russo  03/07/23 1941     minimum assist (75% patients effort)

## 2023-03-08 NOTE — DISCHARGE INSTRUCTIONS
At today's visit, there are no labs or imaging findings that are concerning.  Your EKG did not show an active heart catheterization.  Your lab work did not show any injury to the heart.  It is important that you follow-up with cardiology outpatient.  I have placed a referral.  They should contact you with an appointment.    Your blood pressure was elevated here in the emergency department.  Please keep a journal to present here PCP for possible initiation of hypertension medications.    Return to the emergency department with any worsening symptoms or concerns.  Thank you for allowing me to take care of you today.

## 2023-03-13 ENCOUNTER — PATIENT MESSAGE (OUTPATIENT)
Dept: INTERNAL MEDICINE | Facility: CLINIC | Age: 63
End: 2023-03-13
Payer: COMMERCIAL

## 2023-03-16 ENCOUNTER — HOSPITAL ENCOUNTER (OUTPATIENT)
Dept: RADIOLOGY | Facility: HOSPITAL | Age: 63
Discharge: HOME | End: 2023-03-16
Attending: INTERNAL MEDICINE
Payer: COMMERCIAL

## 2023-03-16 VITALS — BODY MASS INDEX: 37.93 KG/M2 | WEIGHT: 235 LBS

## 2023-03-16 DIAGNOSIS — Z12.31 ENCOUNTER FOR SCREENING MAMMOGRAM FOR BREAST CANCER: ICD-10-CM

## 2023-03-16 PROCEDURE — 77067 SCR MAMMO BI INCL CAD: CPT | Mod: TC

## 2023-03-16 PROCEDURE — 77063 BREAST TOMOSYNTHESIS BI: CPT | Mod: 26,,, | Performed by: RADIOLOGY

## 2023-03-16 PROCEDURE — 77067 SCR MAMMO BI INCL CAD: CPT | Mod: 26,,, | Performed by: RADIOLOGY

## 2023-03-16 PROCEDURE — 77063 MAMMO DIGITAL SCREENING BILAT WITH TOMO: ICD-10-PCS | Mod: 26,,, | Performed by: RADIOLOGY

## 2023-03-16 PROCEDURE — 77067 MAMMO DIGITAL SCREENING BILAT WITH TOMO: ICD-10-PCS | Mod: 26,,, | Performed by: RADIOLOGY

## 2023-03-31 PROBLEM — E78.00 HYPERCHOLESTEROLEMIA: Status: ACTIVE | Noted: 2023-03-31

## 2023-03-31 PROBLEM — R07.89 ATYPICAL CHEST PAIN: Status: ACTIVE | Noted: 2023-03-31

## 2023-03-31 PROBLEM — R06.09 DOE (DYSPNEA ON EXERTION): Status: ACTIVE | Noted: 2023-03-31

## 2023-03-31 NOTE — PROGRESS NOTES
"Subjective:   Patient ID:  Albertina Sim is a 63 y.o. female who presents for Eval of CVD    HPI: She was in ER 4 weeks ago for CP/ARRIETA The patient has no TIA, palpitations, syncope or pre-syncope.Patient does not exercise.ER note said "Patient is a 63-year-old female with no significant medical history who presents to the emergency department with atypical chest pain.  Patient reports over the last several months she is had intermittent sensations on the left side of her chest.  She reports sometimes it is pain in sometimes it is just a funny feeling.  She reports she is had some shortness of breath on exertion.  She reports she saw her PCP today and had an EKG.  They instructed her to come to the emergency department.  She is unsure exactly why.  She does report having a cough over the last week with no sputum production.  She denies lower extremity swelling.  She denies recent travel, recent surgery, previous blood clot, or exogenous estrogen.  She denies fevers or chills.  She denies any chest pain currently."  X Ray ER showed aorta tortuous and ECG showed low anterior forces and one limb lead reversal.  /83 recently in primary care  Starting to do some exercise and BPs home 120s/70s and occasional CP 1-2 minutes all at rest upper left chest pressure -says a lot of stress.      Review of Systems   Constitutional: Negative for chills, decreased appetite, diaphoresis, fever, malaise/fatigue, night sweats, weight gain and weight loss.   HENT:  Negative for congestion, hoarse voice, nosebleeds, sore throat and tinnitus.    Eyes:  Negative for blurred vision, double vision, vision loss in left eye, vision loss in right eye, visual disturbance and visual halos.   Cardiovascular:  Positive for chest pain and dyspnea on exertion. Negative for claudication, cyanosis, irregular heartbeat, leg swelling, near-syncope, orthopnea, palpitations, paroxysmal nocturnal dyspnea and syncope.   Respiratory:  Negative for cough, " "hemoptysis, shortness of breath, sleep disturbances due to breathing, snoring, sputum production and wheezing.    Endocrine: Negative for cold intolerance, heat intolerance, polydipsia, polyphagia and polyuria.   Hematologic/Lymphatic: Negative for adenopathy and bleeding problem. Does not bruise/bleed easily.   Skin:  Negative for color change, dry skin, flushing, itching, nail changes, poor wound healing, rash, skin cancer, suspicious lesions and unusual hair distribution.   Musculoskeletal:  Positive for arthritis, back pain and joint pain. Negative for falls, gout, joint swelling, muscle cramps, muscle weakness, myalgias and stiffness.   Gastrointestinal:  Negative for abdominal pain, anorexia, change in bowel habit, constipation, diarrhea, dysphagia, heartburn, hematemesis, hematochezia, melena and vomiting.   Genitourinary:  Negative for decreased libido, dysuria, hematuria, hesitancy and urgency.   Neurological:  Negative for excessive daytime sleepiness, dizziness, focal weakness, headaches, light-headedness, loss of balance, numbness, paresthesias, seizures, sensory change, tremors, vertigo and weakness.   Psychiatric/Behavioral:  Negative for altered mental status, depression, hallucinations, memory loss, substance abuse and suicidal ideas. The patient does not have insomnia and is not nervous/anxious.    Allergic/Immunologic: Negative for environmental allergies and hives.     Objective: /77 (BP Location: Left arm, Patient Position: Sitting)   Pulse 72   Ht 5' 6" (1.676 m)   Wt 103 kg (227 lb 1.2 oz)   LMP 01/01/2006 (Approximate) Comment: 2006  BMI 36.65 kg/m²      Physical Exam  Constitutional:       Appearance: She is well-developed.   HENT:      Head: Normocephalic.   Eyes:      Pupils: Pupils are equal, round, and reactive to light.   Neck:      Thyroid: No thyromegaly.      Vascular: Normal carotid pulses. No carotid bruit, hepatojugular reflux or JVD.   Cardiovascular:      Rate and " Rhythm: Normal rate and regular rhythm.      Pulses: Intact distal pulses.      Heart sounds: Normal heart sounds. No murmur heard.    No friction rub. No gallop.   Pulmonary:      Effort: Pulmonary effort is normal. No tachypnea or respiratory distress.      Breath sounds: Normal breath sounds. No wheezing or rales.   Chest:      Chest wall: No tenderness.   Abdominal:      General: Bowel sounds are normal. There is no distension.      Palpations: Abdomen is soft. There is no mass.      Tenderness: There is no abdominal tenderness. There is no guarding or rebound.   Musculoskeletal:         General: No tenderness. Normal range of motion.      Cervical back: Normal range of motion.   Lymphadenopathy:      Cervical: No cervical adenopathy.   Skin:     General: Skin is warm.      Findings: No erythema or rash.   Neurological:      Mental Status: She is alert and oriented to person, place, and time.      Cranial Nerves: No cranial nerve deficit.      Coordination: Coordination normal.   Psychiatric:         Behavior: Behavior normal.         Thought Content: Thought content normal.         Judgment: Judgment normal.       Assessment:     1. Atypical chest pain    2. ARRIETA (dyspnea on exertion)    3. Morbid obesity    4. Chronic midline low back pain without sciatica    5. Primary osteoarthritis of left knee    6. Vitamin D deficiency    7. Hypercholesterolemia    8. Elevated blood pressure reading    9. Encounter for screening for cardiovascular disorders    10. Nonspecific abnormal electrocardiogram (ECG) (EKG)    11. Abnormal chest x-ray        Plan:   Discussed diet , achieving and maintaining ideal body weight, and exercise.   We reviewed meds in detail.  Reassured-Discussed goals, options, plan.  Omega-3 > 800 mg/d combined EPA/DHA if CAC high.  Could do CAC and CFD or stress CFD    Albertina was seen today for hospital follow up.    Diagnoses and all orders for this visit:    Atypical chest pain  -     CT Cardiac  Scoring; Future; Expected date: 04/04/2023    ARRIETA (dyspnea on exertion)  -     CT Cardiac Scoring; Future; Expected date: 04/04/2023    Morbid obesity  -     CT Cardiac Scoring; Future; Expected date: 04/04/2023    Chronic midline low back pain without sciatica    Primary osteoarthritis of left knee    Vitamin D deficiency    Hypercholesterolemia  -     CT Cardiac Scoring; Future; Expected date: 04/04/2023    Elevated blood pressure reading  -     Ambulatory referral/consult to Cardiology    Encounter for screening for cardiovascular disorders  -     CT Cardiac Scoring; Future; Expected date: 04/04/2023    Nonspecific abnormal electrocardiogram (ECG) (EKG)    Abnormal chest x-ray            Follow up for CAC cash pay ; after this will get CFD or stress CFD.

## 2023-04-03 ENCOUNTER — HOSPITAL ENCOUNTER (OUTPATIENT)
Dept: RADIOLOGY | Facility: HOSPITAL | Age: 63
Discharge: HOME | End: 2023-04-03
Attending: INTERNAL MEDICINE
Payer: COMMERCIAL

## 2023-04-03 ENCOUNTER — OFFICE VISIT (OUTPATIENT)
Dept: CARDIOLOGY | Facility: CLINIC | Age: 63
End: 2023-04-03
Payer: COMMERCIAL

## 2023-04-03 VITALS
BODY MASS INDEX: 36.49 KG/M2 | HEIGHT: 66 IN | HEART RATE: 72 BPM | DIASTOLIC BLOOD PRESSURE: 77 MMHG | WEIGHT: 227.06 LBS | SYSTOLIC BLOOD PRESSURE: 135 MMHG

## 2023-04-03 DIAGNOSIS — E78.00 HYPERCHOLESTEROLEMIA: ICD-10-CM

## 2023-04-03 DIAGNOSIS — R03.0 ELEVATED BLOOD PRESSURE READING: ICD-10-CM

## 2023-04-03 DIAGNOSIS — R94.31 NONSPECIFIC ABNORMAL ELECTROCARDIOGRAM (ECG) (EKG): ICD-10-CM

## 2023-04-03 DIAGNOSIS — R06.09 DOE (DYSPNEA ON EXERTION): ICD-10-CM

## 2023-04-03 DIAGNOSIS — G89.29 CHRONIC MIDLINE LOW BACK PAIN WITHOUT SCIATICA: ICD-10-CM

## 2023-04-03 DIAGNOSIS — E66.01 MORBID OBESITY: ICD-10-CM

## 2023-04-03 DIAGNOSIS — R07.89 ATYPICAL CHEST PAIN: ICD-10-CM

## 2023-04-03 DIAGNOSIS — M54.50 CHRONIC MIDLINE LOW BACK PAIN WITHOUT SCIATICA: ICD-10-CM

## 2023-04-03 DIAGNOSIS — R07.89 ATYPICAL CHEST PAIN: Primary | ICD-10-CM

## 2023-04-03 DIAGNOSIS — M17.12 PRIMARY OSTEOARTHRITIS OF LEFT KNEE: ICD-10-CM

## 2023-04-03 DIAGNOSIS — R93.89 ABNORMAL CHEST X-RAY: ICD-10-CM

## 2023-04-03 DIAGNOSIS — E55.9 VITAMIN D DEFICIENCY: ICD-10-CM

## 2023-04-03 DIAGNOSIS — Z13.6 ENCOUNTER FOR SCREENING FOR CARDIOVASCULAR DISORDERS: ICD-10-CM

## 2023-04-03 PROCEDURE — 3008F PR BODY MASS INDEX (BMI) DOCUMENTED: ICD-10-PCS | Mod: CPTII,S$GLB,, | Performed by: INTERNAL MEDICINE

## 2023-04-03 PROCEDURE — 99204 OFFICE O/P NEW MOD 45 MIN: CPT | Mod: S$GLB,,, | Performed by: INTERNAL MEDICINE

## 2023-04-03 PROCEDURE — 75571 CT HRT W/O DYE W/CA TEST: CPT | Mod: TC

## 2023-04-03 PROCEDURE — 1160F PR REVIEW ALL MEDS BY PRESCRIBER/CLIN PHARMACIST DOCUMENTED: ICD-10-PCS | Mod: CPTII,S$GLB,, | Performed by: INTERNAL MEDICINE

## 2023-04-03 PROCEDURE — 75571 CT CALCIUM SCORING CARDIAC: ICD-10-PCS | Mod: 26,,, | Performed by: RADIOLOGY

## 2023-04-03 PROCEDURE — 99999 PR PBB SHADOW E&M-EST. PATIENT-LVL IV: CPT | Mod: PBBFAC,,, | Performed by: INTERNAL MEDICINE

## 2023-04-03 PROCEDURE — 99204 PR OFFICE/OUTPT VISIT, NEW, LEVL IV, 45-59 MIN: ICD-10-PCS | Mod: S$GLB,,, | Performed by: INTERNAL MEDICINE

## 2023-04-03 PROCEDURE — 1159F MED LIST DOCD IN RCRD: CPT | Mod: CPTII,S$GLB,, | Performed by: INTERNAL MEDICINE

## 2023-04-03 PROCEDURE — 3075F PR MOST RECENT SYSTOLIC BLOOD PRESS GE 130-139MM HG: ICD-10-PCS | Mod: CPTII,S$GLB,, | Performed by: INTERNAL MEDICINE

## 2023-04-03 PROCEDURE — 99999 PR PBB SHADOW E&M-EST. PATIENT-LVL IV: ICD-10-PCS | Mod: PBBFAC,,, | Performed by: INTERNAL MEDICINE

## 2023-04-03 PROCEDURE — 75571 CT HRT W/O DYE W/CA TEST: CPT | Mod: 26,,, | Performed by: RADIOLOGY

## 2023-04-03 PROCEDURE — 1101F PR PT FALLS ASSESS DOC 0-1 FALLS W/OUT INJ PAST YR: ICD-10-PCS | Mod: CPTII,S$GLB,, | Performed by: INTERNAL MEDICINE

## 2023-04-03 PROCEDURE — 1101F PT FALLS ASSESS-DOCD LE1/YR: CPT | Mod: CPTII,S$GLB,, | Performed by: INTERNAL MEDICINE

## 2023-04-03 PROCEDURE — 1159F PR MEDICATION LIST DOCUMENTED IN MEDICAL RECORD: ICD-10-PCS | Mod: CPTII,S$GLB,, | Performed by: INTERNAL MEDICINE

## 2023-04-03 PROCEDURE — 3288F FALL RISK ASSESSMENT DOCD: CPT | Mod: CPTII,S$GLB,, | Performed by: INTERNAL MEDICINE

## 2023-04-03 PROCEDURE — 1160F RVW MEDS BY RX/DR IN RCRD: CPT | Mod: CPTII,S$GLB,, | Performed by: INTERNAL MEDICINE

## 2023-04-03 PROCEDURE — 3008F BODY MASS INDEX DOCD: CPT | Mod: CPTII,S$GLB,, | Performed by: INTERNAL MEDICINE

## 2023-04-03 PROCEDURE — 3078F PR MOST RECENT DIASTOLIC BLOOD PRESSURE < 80 MM HG: ICD-10-PCS | Mod: CPTII,S$GLB,, | Performed by: INTERNAL MEDICINE

## 2023-04-03 PROCEDURE — 3078F DIAST BP <80 MM HG: CPT | Mod: CPTII,S$GLB,, | Performed by: INTERNAL MEDICINE

## 2023-04-03 PROCEDURE — 3288F PR FALLS RISK ASSESSMENT DOCUMENTED: ICD-10-PCS | Mod: CPTII,S$GLB,, | Performed by: INTERNAL MEDICINE

## 2023-04-03 PROCEDURE — 3075F SYST BP GE 130 - 139MM HG: CPT | Mod: CPTII,S$GLB,, | Performed by: INTERNAL MEDICINE

## 2023-04-04 ENCOUNTER — TELEPHONE (OUTPATIENT)
Dept: CARDIOLOGY | Facility: CLINIC | Age: 63
End: 2023-04-04
Payer: COMMERCIAL

## 2023-04-04 ENCOUNTER — PATIENT MESSAGE (OUTPATIENT)
Dept: RESEARCH | Facility: HOSPITAL | Age: 63
End: 2023-04-04
Payer: COMMERCIAL

## 2023-04-04 DIAGNOSIS — R06.09 DOE (DYSPNEA ON EXERTION): ICD-10-CM

## 2023-04-04 DIAGNOSIS — R03.0 ELEVATED BLOOD PRESSURE READING: ICD-10-CM

## 2023-04-04 DIAGNOSIS — R07.89 ATYPICAL CHEST PAIN: Primary | ICD-10-CM

## 2023-05-09 ENCOUNTER — PATIENT MESSAGE (OUTPATIENT)
Dept: RESEARCH | Facility: HOSPITAL | Age: 63
End: 2023-05-09
Payer: COMMERCIAL

## 2023-05-16 ENCOUNTER — PATIENT MESSAGE (OUTPATIENT)
Dept: RESEARCH | Facility: HOSPITAL | Age: 63
End: 2023-05-16
Payer: COMMERCIAL

## 2023-07-03 ENCOUNTER — HOSPITAL ENCOUNTER (OUTPATIENT)
Dept: RADIOLOGY | Facility: HOSPITAL | Age: 63
Discharge: HOME | End: 2023-07-03
Attending: PHYSICIAN ASSISTANT
Payer: COMMERCIAL

## 2023-07-03 ENCOUNTER — OFFICE VISIT (OUTPATIENT)
Dept: ORTHOPEDICS | Facility: CLINIC | Age: 63
End: 2023-07-03
Payer: COMMERCIAL

## 2023-07-03 VITALS — WEIGHT: 242.5 LBS | BODY MASS INDEX: 38.97 KG/M2 | HEIGHT: 66 IN

## 2023-07-03 DIAGNOSIS — R52 PAIN: ICD-10-CM

## 2023-07-03 DIAGNOSIS — M17.0 PRIMARY OSTEOARTHRITIS OF BOTH KNEES: Primary | ICD-10-CM

## 2023-07-03 PROCEDURE — 1160F PR REVIEW ALL MEDS BY PRESCRIBER/CLIN PHARMACIST DOCUMENTED: ICD-10-PCS | Mod: CPTII,S$GLB,, | Performed by: PHYSICIAN ASSISTANT

## 2023-07-03 PROCEDURE — 3288F PR FALLS RISK ASSESSMENT DOCUMENTED: ICD-10-PCS | Mod: CPTII,S$GLB,, | Performed by: PHYSICIAN ASSISTANT

## 2023-07-03 PROCEDURE — 3008F PR BODY MASS INDEX (BMI) DOCUMENTED: ICD-10-PCS | Mod: CPTII,S$GLB,, | Performed by: PHYSICIAN ASSISTANT

## 2023-07-03 PROCEDURE — 3288F FALL RISK ASSESSMENT DOCD: CPT | Mod: CPTII,S$GLB,, | Performed by: PHYSICIAN ASSISTANT

## 2023-07-03 PROCEDURE — 99203 PR OFFICE/OUTPT VISIT, NEW, LEVL III, 30-44 MIN: ICD-10-PCS | Mod: S$GLB,,, | Performed by: PHYSICIAN ASSISTANT

## 2023-07-03 PROCEDURE — 73562 X-RAY EXAM OF KNEE 3: CPT | Mod: TC,50

## 2023-07-03 PROCEDURE — 99203 OFFICE O/P NEW LOW 30 MIN: CPT | Mod: S$GLB,,, | Performed by: PHYSICIAN ASSISTANT

## 2023-07-03 PROCEDURE — 99999 PR PBB SHADOW E&M-EST. PATIENT-LVL III: CPT | Mod: PBBFAC,,, | Performed by: PHYSICIAN ASSISTANT

## 2023-07-03 PROCEDURE — 1160F RVW MEDS BY RX/DR IN RCRD: CPT | Mod: CPTII,S$GLB,, | Performed by: PHYSICIAN ASSISTANT

## 2023-07-03 PROCEDURE — 73562 X-RAY EXAM OF KNEE 3: CPT | Mod: 26,,, | Performed by: RADIOLOGY

## 2023-07-03 PROCEDURE — 3008F BODY MASS INDEX DOCD: CPT | Mod: CPTII,S$GLB,, | Performed by: PHYSICIAN ASSISTANT

## 2023-07-03 PROCEDURE — 1159F PR MEDICATION LIST DOCUMENTED IN MEDICAL RECORD: ICD-10-PCS | Mod: CPTII,S$GLB,, | Performed by: PHYSICIAN ASSISTANT

## 2023-07-03 PROCEDURE — 99999 PR PBB SHADOW E&M-EST. PATIENT-LVL III: ICD-10-PCS | Mod: PBBFAC,,, | Performed by: PHYSICIAN ASSISTANT

## 2023-07-03 PROCEDURE — 73562 XR KNEE ORTHO BILAT: ICD-10-PCS | Mod: 26,,, | Performed by: RADIOLOGY

## 2023-07-03 PROCEDURE — 1101F PT FALLS ASSESS-DOCD LE1/YR: CPT | Mod: CPTII,S$GLB,, | Performed by: PHYSICIAN ASSISTANT

## 2023-07-03 PROCEDURE — 1101F PR PT FALLS ASSESS DOC 0-1 FALLS W/OUT INJ PAST YR: ICD-10-PCS | Mod: CPTII,S$GLB,, | Performed by: PHYSICIAN ASSISTANT

## 2023-07-03 PROCEDURE — 1159F MED LIST DOCD IN RCRD: CPT | Mod: CPTII,S$GLB,, | Performed by: PHYSICIAN ASSISTANT

## 2023-07-03 RX ORDER — MELOXICAM 15 MG/1
15 TABLET ORAL DAILY
Qty: 30 TABLET | Refills: 1 | Status: SHIPPED | OUTPATIENT
Start: 2023-07-03 | End: 2023-07-20 | Stop reason: SDUPTHER

## 2023-07-03 NOTE — PROGRESS NOTES
"  SUBJECTIVE:     Chief Complaint   Patient presents with    Right Knee - Pain    Left Knee - Pain       History of Present Illness:  Albertina Sim is a 63 y.o. year old female here with complaints of constant bilateral knee pain which started several years ago.  The right is worse than the left. There is not a history of trauma.  The pain is located in the global aspect of the knee.  The pain is described as achy. There is catching or locking.  Aggravating factors include standing, walking, and increased activity .  Associated symptoms include knee giving out. Previous treatments include rest, OTC aleve, and CSI (years ago) and PT. There is not a history of previous injury or surgery to the knee.  The patient does not use an assistive device.    Review of patient's allergies indicates:   Allergen Reactions    No known drug allergies          Current Outpatient Medications   Medication Sig Dispense Refill    cholecalciferol, vitamin D3, (VITAMIN D3) 2,000 unit Cap Take 1 capsule (2,000 Units total) by mouth once daily.      multivitamin (THERAGRAN) per tablet Take 1 tablet by mouth once daily.      naproxen sodium (ANAPROX) 220 MG tablet Take 220 mg by mouth every 12 (twelve) hours.       No current facility-administered medications for this visit.       Past Medical History:   Diagnosis Date    Arthritis     Sickle cell trait     Vitamin D deficiency        Past Surgical History:   Procedure Laterality Date    COLONOSCOPY N/A 2/14/2023    Procedure: COLONOSCOPY;  Surgeon: Alessandro Cedeño MD;  Location: [address];  Service: Endoscopy;  Laterality: N/A;  instr emailed-ml  2/7/23 pre call complete - [email or web address]         Review of Systems:  ROS:  Constitutional: no fever or chills  Eyes: no visual changes  ENT: no nasal congestion or sore throat  Respiratory: no cough or shortness of breath  Musculoskeletal: no arthralgias or myalgias      OBJECTIVE:     PHYSICAL EXAM:  Height: 5' 5.98" (167.6 cm) Weight: 110 kg " (242 lb 8.1 oz)   General: Well developed, well nourished, in no acute distress.  Neurological: Mood & affect are normal.  HEENT: NCAT, sclera nonicteric   Lungs: Respirations are equal and unlabored.   CV: 2+ bilateral upper and lower extremity pulses.   Skin: Intact throughout with no rashes, erythema, or lesions  Extremities: No LE edema, no erythema or warmth of the skin in either lower extremity.    Bilateral Knee Exam:  Knee Range of Motion: 0-120   Effusion: none  Condition of skin: intact  Location of tenderness: Medial joint line   Strength: 5 of 5 quadriceps strength and 5 of 5 hamstring strength  Stability:  stable to testing  Varus /Valgus stress:  normal    IMAGING:    X-rays of the bilateral knee, personally reviewed by me, demonstrate severe degenerative changes right knee with joint space narrowing, osteophyte formation and subchondral sclerosis.  No fracture or dislocation.   Left knee shows mild tricompartmental degenerative changes    ASSESSMENT/PLAN:   63 y.o. year old female with bilateral knee osteoarthritis    Plan: We discussed with the patient at length all the different treatment options available for the knee including NSAIDS, acetaminophen, rest, ice, knee strengthening exercise, steroid injections for temporary relief, Viscosupplementation, and knee arthroplasty.   - She has failed conservative treatment over the past several years and is interested in TKA.   We discussed surgery and she was referred to one of our joint surgeons.  - Discussed weight maintenance and BMI goal   - Continue HEP  - Meloxicam prescription  - Follow up if symptoms worsen or fail to improve

## 2023-07-20 ENCOUNTER — OFFICE VISIT (OUTPATIENT)
Dept: ORTHOPEDICS | Facility: CLINIC | Age: 63
End: 2023-07-20
Payer: COMMERCIAL

## 2023-07-20 VITALS — HEIGHT: 66 IN | WEIGHT: 238.56 LBS | BODY MASS INDEX: 38.34 KG/M2

## 2023-07-20 DIAGNOSIS — M17.11 PRIMARY OSTEOARTHRITIS OF RIGHT KNEE: ICD-10-CM

## 2023-07-20 DIAGNOSIS — M17.12 PRIMARY OSTEOARTHRITIS OF LEFT KNEE: Primary | ICD-10-CM

## 2023-07-20 PROCEDURE — 1160F RVW MEDS BY RX/DR IN RCRD: CPT | Mod: CPTII,S$GLB,, | Performed by: ORTHOPAEDIC SURGERY

## 2023-07-20 PROCEDURE — 99999 PR PBB SHADOW E&M-EST. PATIENT-LVL III: CPT | Mod: PBBFAC,,, | Performed by: ORTHOPAEDIC SURGERY

## 2023-07-20 PROCEDURE — 99213 PR OFFICE/OUTPT VISIT, EST, LEVL III, 20-29 MIN: ICD-10-PCS | Mod: S$GLB,,, | Performed by: ORTHOPAEDIC SURGERY

## 2023-07-20 PROCEDURE — 1101F PT FALLS ASSESS-DOCD LE1/YR: CPT | Mod: CPTII,S$GLB,, | Performed by: ORTHOPAEDIC SURGERY

## 2023-07-20 PROCEDURE — 1160F PR REVIEW ALL MEDS BY PRESCRIBER/CLIN PHARMACIST DOCUMENTED: ICD-10-PCS | Mod: CPTII,S$GLB,, | Performed by: ORTHOPAEDIC SURGERY

## 2023-07-20 PROCEDURE — 99999 PR PBB SHADOW E&M-EST. PATIENT-LVL III: ICD-10-PCS | Mod: PBBFAC,,, | Performed by: ORTHOPAEDIC SURGERY

## 2023-07-20 PROCEDURE — 99213 OFFICE O/P EST LOW 20 MIN: CPT | Mod: S$GLB,,, | Performed by: ORTHOPAEDIC SURGERY

## 2023-07-20 PROCEDURE — 1159F PR MEDICATION LIST DOCUMENTED IN MEDICAL RECORD: ICD-10-PCS | Mod: CPTII,S$GLB,, | Performed by: ORTHOPAEDIC SURGERY

## 2023-07-20 PROCEDURE — 1159F MED LIST DOCD IN RCRD: CPT | Mod: CPTII,S$GLB,, | Performed by: ORTHOPAEDIC SURGERY

## 2023-07-20 PROCEDURE — 1101F PR PT FALLS ASSESS DOC 0-1 FALLS W/OUT INJ PAST YR: ICD-10-PCS | Mod: CPTII,S$GLB,, | Performed by: ORTHOPAEDIC SURGERY

## 2023-07-20 PROCEDURE — 3288F FALL RISK ASSESSMENT DOCD: CPT | Mod: CPTII,S$GLB,, | Performed by: ORTHOPAEDIC SURGERY

## 2023-07-20 PROCEDURE — 3008F BODY MASS INDEX DOCD: CPT | Mod: CPTII,S$GLB,, | Performed by: ORTHOPAEDIC SURGERY

## 2023-07-20 PROCEDURE — 3008F PR BODY MASS INDEX (BMI) DOCUMENTED: ICD-10-PCS | Mod: CPTII,S$GLB,, | Performed by: ORTHOPAEDIC SURGERY

## 2023-07-20 PROCEDURE — 3288F PR FALLS RISK ASSESSMENT DOCUMENTED: ICD-10-PCS | Mod: CPTII,S$GLB,, | Performed by: ORTHOPAEDIC SURGERY

## 2023-07-20 RX ORDER — MELOXICAM 15 MG/1
15 TABLET ORAL DAILY
Qty: 30 TABLET | Refills: 1 | Status: SHIPPED | OUTPATIENT
Start: 2023-07-20 | End: 2023-09-18

## 2023-07-20 NOTE — PROGRESS NOTES
Subjective:   Albertina Sim is a 63 y.o. female who presents for evaluation of bilateral knee pain (right > left) that has been present for multiple years.  Pain is worse with activity, most notably while going upstairs.  She is been taking Aleve every morning and reports that his provided minimal relief.  Has previously tried injections and states that they provide around 1 month of relief.  Ambulates without assistance at baseline and reports she is able to walk > 1 mile before needing to rest due to pain.  She works at [employer] and states that she would need to finish the semester prior to considering surgery.      Medications Ordered This Encounter   Medications    meloxicam (MOBIC) 15 MG tablet     Sig: Take 1 tablet (15 mg total) by mouth once daily.     Dispense:  30 tablet     Refill:  1        Past Medical History:   Diagnosis Date    Arthritis     Sickle cell trait     Vitamin D deficiency        Past Surgical History:   Procedure Laterality Date    COLONOSCOPY N/A 2/14/2023    Procedure: COLONOSCOPY;  Surgeon: Alessandro Cedeño MD;  Location: [facility] ([address] [place] [address]);  Service: Endoscopy;  Laterality: N/A;  instr emailed-ml  2/7/23 pre call amalia - [email or web address]       Family History   Problem Relation Age of Onset    Atrial fibrillation Mother         dementia, 91    Hypertension Mother     Arthritis Mother     Heart disease Mother     Cancer Father         lung, 90    Glaucoma Father     Stroke Brother     Cancer Sister     Cancer Brother     Stroke Brother        Social History     Socioeconomic History    Marital status: Single   Occupational History     Employer: [employer]   Tobacco Use    Smoking status: Never    Smokeless tobacco: Never   Substance and Sexual Activity    Alcohol use: Not Currently     Comment: rare    Drug use: No    Sexual activity: Not Currently   Social History Narrative    At [employer], 10 years in May. Really enjoys her employment.  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    Before the [employer] in [facility] access [facility]  [place]. For 15 years, really tied her down, got washed out by [employer]    Both parents passed    Stopped riding bike to work this summer b/o heat    Job stressed, several people left dept.    She walks her Lithuanian dog twice daily.    She's doing yoga and enjoying the relaxation.    Single, no children.    Youngest in her family of origin. Grieving the death of her sister who  of a brain tumor, at the age of 64.    She was a twin and her twin sister, identical twin sister and now has memory problems.        No current physical activity.     Social Determinants of Health     Financial Resource Strain: Low Risk     Difficulty of Paying Living Expenses: Not hard at all   Food Insecurity: No Food Insecurity    Worried About Running Out of Food in the Last Year: Never true    Ran Out of Food in the Last Year: Never true   Transportation Needs: No Transportation Needs    Lack of Transportation (Medical): No    Lack of Transportation (Non-Medical): No   Physical Activity: Insufficiently Active    Days of Exercise per Week: 1 day    Minutes of Exercise per Session: 20 min   Stress: No Stress Concern Present    Feeling of Stress : Not at all   Social Connections: Unknown    Frequency of Communication with Friends and Family: More than three times a week    Frequency of Social Gatherings with Friends and Family: More than three times a week    Active Member of Clubs or Organizations: Yes    Attends Club or Organization Meetings: 1 to 4 times per year    Marital Status: Never    Housing Stability: Low Risk     Unable to Pay for Housing in the Last Year: No    Number of Places Lived in the Last Year: 1    Unstable Housing in the Last Year: No       ROS:  A review of systems is updated and there are no reported vision changes, ear/nose/mouth/throat complaints,  chest pain, shortness of breath, abdominal pain, urological complaints,  "fevers or chills, psychiatric complaints. Musculoskeletal and neurologcial symptoms are as documented. All other systems are negative.    Objective:      Vitals:    07/20/23 0854   Weight: 108.2 kg (238 lb 8.6 oz)   Height: 5' 6" (1.676 m)     Physical Exam    MSK:  Right Lower Extremity  Inspection  - Dynamic thrust at right knee with ambulation   - Skin intact throughout, no open wounds  - Swelling present at anteromedial knee  Palpation  - Medial joint line TTP  Range of motion  - ROM at right knee limited secondary to pain      Flexion:  110 degrees      Extension:  0 degrees  Neurovascular  - TA/EHL/Gastroc/FHL assessed in isolation without deficit  - SILT throughout  - Compartments soft  - DP palpated   - Capillary Refill <3s  - Muscle tone normal      Imaging Review: I independently reviewed and interpreted the imaging and my findings are as follows:  X-rays of bilateral knees showing Kellgren Vince grade 4 osteoarthritis at the right knee and grade 3 osteoarthritis of the left knee    Assessment:     DJD, knees    Plan:     Patient would like to complete the upcoming school semester prior to surgery, therefore she was recommended to contact [facility] in October to schedule primary right total knee arthroplasty.    The patient is prescribed meloxicam for short-term daily use, with intermittent as-needed use over the longer term.   Appropriate cautions are provided regarding possible GI and renal adverse effects.  The synergy of meloxicam and acetaminophen is described, and Tylenol Arthritis or the generic equivalent is recommended, as often as every 8 hours.  The patient is cautioned to avoid exceeding 3000 mg of Tylenol every 24 hours.  The record is reviewed regarding history renal or hepatic dysfunction, history of GI disease.      The patient's pathophysiology was explained in detail with reference to x-rays, models, other visual aids as appropriate.  Treatment options were discussed in detail.  " Questions were invited and answered to the patient's satisfaction.

## 2023-07-21 PROBLEM — M17.11 PRIMARY OSTEOARTHRITIS OF RIGHT KNEE: Status: ACTIVE | Noted: 2023-07-21

## 2023-07-25 ENCOUNTER — OFFICE VISIT (OUTPATIENT)
Dept: INTERNAL MEDICINE | Facility: CLINIC | Age: 63
End: 2023-07-25
Payer: COMMERCIAL

## 2023-07-25 VITALS
DIASTOLIC BLOOD PRESSURE: 84 MMHG | RESPIRATION RATE: 18 BRPM | BODY MASS INDEX: 38.37 KG/M2 | OXYGEN SATURATION: 98 % | SYSTOLIC BLOOD PRESSURE: 134 MMHG | WEIGHT: 238.75 LBS | HEIGHT: 66 IN | HEART RATE: 76 BPM

## 2023-07-25 DIAGNOSIS — D57.3 SICKLE CELL TRAIT: ICD-10-CM

## 2023-07-25 DIAGNOSIS — Z13.820 ENCOUNTER FOR SCREENING FOR OSTEOPOROSIS: ICD-10-CM

## 2023-07-25 DIAGNOSIS — Z13.220 SCREENING FOR LIPID DISORDERS: ICD-10-CM

## 2023-07-25 DIAGNOSIS — N18.31 CHRONIC KIDNEY DISEASE, STAGE 3A: ICD-10-CM

## 2023-07-25 DIAGNOSIS — Z00.00 ENCOUNTER FOR ANNUAL PHYSICAL EXAM: Primary | ICD-10-CM

## 2023-07-25 PROCEDURE — 1101F PT FALLS ASSESS-DOCD LE1/YR: CPT | Mod: CPTII,S$GLB,, | Performed by: INTERNAL MEDICINE

## 2023-07-25 PROCEDURE — 99999 PR PBB SHADOW E&M-EST. PATIENT-LVL IV: ICD-10-PCS | Mod: PBBFAC,,, | Performed by: INTERNAL MEDICINE

## 2023-07-25 PROCEDURE — 99397 PR PREVENTIVE VISIT,EST,65 & OVER: ICD-10-PCS | Mod: S$GLB,,, | Performed by: INTERNAL MEDICINE

## 2023-07-25 PROCEDURE — 3079F PR MOST RECENT DIASTOLIC BLOOD PRESSURE 80-89 MM HG: ICD-10-PCS | Mod: CPTII,S$GLB,, | Performed by: INTERNAL MEDICINE

## 2023-07-25 PROCEDURE — 3288F PR FALLS RISK ASSESSMENT DOCUMENTED: ICD-10-PCS | Mod: CPTII,S$GLB,, | Performed by: INTERNAL MEDICINE

## 2023-07-25 PROCEDURE — 1159F PR MEDICATION LIST DOCUMENTED IN MEDICAL RECORD: ICD-10-PCS | Mod: CPTII,S$GLB,, | Performed by: INTERNAL MEDICINE

## 2023-07-25 PROCEDURE — 3008F PR BODY MASS INDEX (BMI) DOCUMENTED: ICD-10-PCS | Mod: CPTII,S$GLB,, | Performed by: INTERNAL MEDICINE

## 2023-07-25 PROCEDURE — 3008F BODY MASS INDEX DOCD: CPT | Mod: CPTII,S$GLB,, | Performed by: INTERNAL MEDICINE

## 2023-07-25 PROCEDURE — 3079F DIAST BP 80-89 MM HG: CPT | Mod: CPTII,S$GLB,, | Performed by: INTERNAL MEDICINE

## 2023-07-25 PROCEDURE — 3288F FALL RISK ASSESSMENT DOCD: CPT | Mod: CPTII,S$GLB,, | Performed by: INTERNAL MEDICINE

## 2023-07-25 PROCEDURE — 1101F PR PT FALLS ASSESS DOC 0-1 FALLS W/OUT INJ PAST YR: ICD-10-PCS | Mod: CPTII,S$GLB,, | Performed by: INTERNAL MEDICINE

## 2023-07-25 PROCEDURE — 99999 PR PBB SHADOW E&M-EST. PATIENT-LVL IV: CPT | Mod: PBBFAC,,, | Performed by: INTERNAL MEDICINE

## 2023-07-25 PROCEDURE — 3075F PR MOST RECENT SYSTOLIC BLOOD PRESS GE 130-139MM HG: ICD-10-PCS | Mod: CPTII,S$GLB,, | Performed by: INTERNAL MEDICINE

## 2023-07-25 PROCEDURE — 1159F MED LIST DOCD IN RCRD: CPT | Mod: CPTII,S$GLB,, | Performed by: INTERNAL MEDICINE

## 2023-07-25 PROCEDURE — 3075F SYST BP GE 130 - 139MM HG: CPT | Mod: CPTII,S$GLB,, | Performed by: INTERNAL MEDICINE

## 2023-07-25 PROCEDURE — 99397 PER PM REEVAL EST PAT 65+ YR: CPT | Mod: S$GLB,,, | Performed by: INTERNAL MEDICINE

## 2023-07-25 NOTE — PROGRESS NOTES
Subjective:       Patient ID: Albertina Sim is a 63 y.o. female.    Chief Complaint: Establish Care    HPI    Presents to establish care and for annual exam.     PMHx    OA of right knee. Following with ortho, started meloxicam. Plan is for knee replacement in January.    Was having chest pain in April 2023, cardiac eval was negative. Had CT calcium score which was zero.     Sickle cell traint: hemoglobin ~ 10-11 at baseline     Health Maintenance:  Colon Cancer Screening: colonoscopy in 2023 with polyps removed. Due in 2028.   Mammogram: normal March 2023   DEXA: order today   HIV: negative 2023   Hep C: negative 2023   Lipids: order today   Vaccines:  up to date.       Review of Systems   Constitutional:  Negative for activity change, appetite change and chills.   HENT:  Negative for ear pain, sinus pressure/congestion and sneezing.    Respiratory:  Negative for cough and shortness of breath.    Cardiovascular:  Negative for chest pain, palpitations and leg swelling.   Gastrointestinal:  Negative for abdominal distention, abdominal pain, constipation, diarrhea, nausea and vomiting.   Genitourinary:  Negative for dysuria and hematuria.   Musculoskeletal:  Negative for arthralgias, back pain and myalgias.   Neurological:  Negative for dizziness and headaches.   Psychiatric/Behavioral:  Negative for agitation. The patient is not nervous/anxious.          Past Medical History:   Diagnosis Date    Arthritis     Sickle cell trait     Vitamin D deficiency      Past Surgical History:   Procedure Laterality Date    COLONOSCOPY N/A 2/14/2023    Procedure: COLONOSCOPY;  Surgeon: Alessandro Cedeño MD;  Location: [address];  Service: Endoscopy;  Laterality: N/A;  instr emailed-ml  2/7/23 pre call amalia - [email or web address]      Patient Active Problem List   Diagnosis    Hallux valgus (acquired)    DJD (degenerative joint disease) of knee    Sciatica of right side    Anemia, sickle trait, documented since 2005    Sickle cell  trait    Class 2 severe obesity due to excess calories with serious comorbidity and body mass index (BMI) of 37.0 to 37.9 in adult    Chronic midline low back pain without sciatica    Primary osteoarthritis of left knee    Chronic pain of left knee    Elevated blood pressure reading    Vitamin D deficiency    Nocturnal leg cramps    Screen for colon cancer, next 03/2022    Atypical chest pain    ARRIETA (dyspnea on exertion)    Hypercholesterolemia    Nonspecific abnormal electrocardiogram (ECG) (EKG)    Abnormal chest x-ray    Primary osteoarthritis of right knee    Chronic kidney disease, stage 3a        Objective:      Physical Exam  Constitutional:       Appearance: Normal appearance.   HENT:      Head: Normocephalic.      Right Ear: Tympanic membrane normal.      Left Ear: Tympanic membrane normal.      Nose: Nose normal.   Cardiovascular:      Rate and Rhythm: Normal rate and regular rhythm.      Pulses: Normal pulses.      Heart sounds: Normal heart sounds.   Pulmonary:      Effort: Pulmonary effort is normal.      Breath sounds: Normal breath sounds.   Abdominal:      General: Abdomen is flat. Bowel sounds are normal.      Palpations: Abdomen is soft.   Musculoskeletal:         General: Normal range of motion.      Cervical back: Normal range of motion and neck supple.   Skin:     General: Skin is warm and dry.   Neurological:      General: No focal deficit present.      Mental Status: She is alert and oriented to person, place, and time.   Psychiatric:         Mood and Affect: Mood normal.       Assessment:       Problem List Items Addressed This Visit          Renal/    Chronic kidney disease, stage 3a    Relevant Orders    Comprehensive Metabolic Panel    Hemoglobin A1C    TSH       Oncology    Sickle cell trait    Relevant Orders    CBC Auto Differential     Other Visit Diagnoses       Encounter for annual physical exam    -  Primary    Screening for lipid disorders        Relevant Orders    Lipid Panel     Encounter for screening for osteoporosis        Relevant Orders    DXA Bone Density Axial Skeleton 1 or more sites            Plan:         Albertina was seen today for establish care.    Diagnoses and all orders for this visit:    Encounter for annual physical exam  Colon Cancer Screening: colonoscopy in 2023 with polyps removed. Due in 2028.   Mammogram: normal March 2023   DEXA: order today   HIV: negative 2023   Hep C: negative 2023   Lipids: order today   Vaccines:  up to date.   Annual wellness exam completed.     All medications, histories, and concerns reviewed, reconciled, and addressed.     Appropriate Screenings per pt's sex and age have been reviewed and discussed with pt.       Chronic kidney disease, stage 3a  GFR slightly decreased on last two CMPs.   Will repeat today     Sickle cell trait  -     CBC Auto Differential; Future  Anemia stable over the last 10 years.     Screening for lipid disorders  -     Lipid Panel; Future    Encounter for screening for osteoporosis  -     DXA Bone Density Axial Skeleton 1 or more sites; Future                 Angelia Roldan MD   Internal Medicine   Primary Care

## 2023-07-26 ENCOUNTER — PATIENT MESSAGE (OUTPATIENT)
Dept: INTERNAL MEDICINE | Facility: CLINIC | Age: 63
End: 2023-07-26
Payer: COMMERCIAL

## 2023-07-26 ENCOUNTER — HOSPITAL ENCOUNTER (OUTPATIENT)
Dept: RADIOLOGY | Facility: CLINIC | Age: 63
Discharge: HOME | End: 2023-07-26
Attending: INTERNAL MEDICINE
Payer: COMMERCIAL

## 2023-07-26 DIAGNOSIS — Z13.820 ENCOUNTER FOR SCREENING FOR OSTEOPOROSIS: ICD-10-CM

## 2023-07-26 PROCEDURE — 77080 DXA BONE DENSITY AXIAL: CPT | Mod: TC

## 2023-07-26 PROCEDURE — 77080 DXA BONE DENSITY AXIAL SKELETON 1 OR MORE SITES: ICD-10-PCS | Mod: 26,,, | Performed by: INTERNAL MEDICINE

## 2023-07-26 PROCEDURE — 77080 DXA BONE DENSITY AXIAL: CPT | Mod: 26,,, | Performed by: INTERNAL MEDICINE

## 2023-08-01 ENCOUNTER — PATIENT MESSAGE (OUTPATIENT)
Dept: INTERNAL MEDICINE | Facility: CLINIC | Age: 63
End: 2023-08-01
Payer: COMMERCIAL

## 2023-09-18 RX ORDER — MELOXICAM 15 MG/1
15 TABLET ORAL
Qty: 30 TABLET | Refills: 1 | Status: SHIPPED | OUTPATIENT
Start: 2023-09-18 | End: 2023-10-07 | Stop reason: SDUPTHER

## 2023-10-09 RX ORDER — MELOXICAM 15 MG/1
15 TABLET ORAL DAILY
Qty: 30 TABLET | Refills: 1 | Status: SHIPPED | OUTPATIENT
Start: 2023-10-09

## 2023-11-08 ENCOUNTER — TELEPHONE (OUTPATIENT)
Dept: ORTHOPEDICS | Facility: CLINIC | Age: 63
End: 2023-11-08
Payer: COMMERCIAL

## 2023-11-08 NOTE — TELEPHONE ENCOUNTER
Called and spoke to pt regarding message below. Pt stated she had questions about pricing and sx date. Gave pt number to central pricing office and advised pt dr arredondo would be booked until Jan 24. Pt stated she was ok with jan 24. Advised pt once I got next years schedule I would call with a date. Pt understood conversation and verbalized understandings.   ----- Message from Uma LOCO Route sent at 11/8/2023 10:57 AM CST -----  Regarding: Schedule Knee Procedure  Contact: pt. [phone]  Pt is calling in ref to scheduling her right knee procedure with provider. Patient Requesting Call Back @ [phone]

## 2023-11-08 NOTE — TELEPHONE ENCOUNTER
Tanner with name and call back number  ----- Message from Marily Ibanez sent at 11/8/2023 11:47 AM CST -----  Regarding: Pt Advice  Contact: [phone]  CONSULT/ADVISORY    Name of Caller:  RICARDA HERNANDEZ [[id number]]    Contact Preference: [phone]    Nature of Call: Pt states she previously spoke with Ana about scheduling knee surgery and was referred to Billing.  Billing is requiring a surgery code to give pt estimate.  Please call.

## 2023-11-09 ENCOUNTER — TELEPHONE (OUTPATIENT)
Dept: ORTHOPEDICS | Facility: CLINIC | Age: 63
End: 2023-11-09
Payer: COMMERCIAL

## 2023-11-09 NOTE — TELEPHONE ENCOUNTER
Called and spoke to pt regarding code for surgery. Pt is not scheduled yet but wanted to get a price from the central billing to know how much surgery will be. Pt understood conversation and verbalized understandings.   ----- Message from Marily Ibanez sent at 11/9/2023  8:53 AM CST -----  Regarding: Missed Call  Contact: [phone]  Returning a Missed Call         Caller:  RICARDA HERNANDEZ [[id number]]           Returning call to:  Sam           Caller can be reached @:[phone]         Nature of the call:  Missed Call

## 2024-02-20 ENCOUNTER — OFFICE VISIT (OUTPATIENT)
Dept: DERMATOLOGY | Facility: CLINIC | Age: 64
End: 2024-02-20
Payer: COMMERCIAL

## 2024-02-20 DIAGNOSIS — L81.1 MELASMA: Primary | ICD-10-CM

## 2024-02-20 PROCEDURE — 1160F RVW MEDS BY RX/DR IN RCRD: CPT | Mod: CPTII,95,, | Performed by: DERMATOLOGY

## 2024-02-20 PROCEDURE — 1159F MED LIST DOCD IN RCRD: CPT | Mod: CPTII,95,, | Performed by: DERMATOLOGY

## 2024-02-20 PROCEDURE — 99204 OFFICE O/P NEW MOD 45 MIN: CPT | Mod: 95,,, | Performed by: DERMATOLOGY

## 2024-02-20 NOTE — PROGRESS NOTES
The patient location is: home  The chief complaint leading to consultation is: skin discoloration  Visit type: virtual visit with synchronous audio and video  Total time spent with patient: 13 minutes  Each patient to whom I provide medical services by telemedicine is:  (1) informed of the relationship between the physician and patient and the respective role of any other health care provider with respect to management of the patient; and (2) notified that he or she may decline to receive medical services by telemedicine and may withdraw from such care at any time.      Patient Information  Name: Albertina Sim  : 1960  MRN: [id number]     Referring Physician:  No ref. provider found   Primary Care Physician:  Angelia Roldan MD   Date of Visit: 2024      Subjective:     History of Present lllness:    Albertina Sim is a 64 y.o. female who presents with a chief complaint of discoloration.  Location: cheeks and forehead, new spot on nose  Duration: > 1 yr  Signs/Symptoms: dark areas, spreading and getting new ones  Relieving factors/Prior treatments: none; previously had a rash due to SPF so doesn't wear it    Clinical documentation obtained by nursing staff reviewed.    Review of Systems    Objective:   Physical Exam   Constitutional: She appears well-developed and well-nourished. No distress.   Neurological: She is alert and oriented to person, place, and time. She is not disoriented.   Psychiatric: She has a normal mood and affect.   Skin:   Areas Examined (abnormalities noted in diagram):   Head / Face Inspection Performed            Diagram Legend     Erythematous scaling macule/papule c/w actinic keratosis       Vascular papule c/w angioma      Pigmented verrucoid papule/plaque c/w seborrheic keratosis      Yellow umbilicated papule c/w sebaceous hyperplasia      Irregularly shaped tan macule c/w lentigo     1-2 mm smooth white papules consistent with Milia      Movable subcutaneous cyst with punctum  c/w epidermal inclusion cyst      Subcutaneous movable cyst c/w pilar cyst      Firm pink to brown papule c/w dermatofibroma      Pedunculated fleshy papule(s) c/w skin tag(s)      Evenly pigmented macule c/w junctional nevus     Mildly variegated pigmented, slightly irregular-bordered macule c/w mildly atypical nevus      Flesh colored to evenly pigmented papule c/w intradermal nevus       Pink pearly papule/plaque c/w basal cell carcinoma      Erythematous hyperkeratotic cursted plaque c/w SCC      Surgical scar with no sign of skin cancer recurrence      Open and closed comedones      Inflammatory papules and pustules      Verrucoid papule consistent consistent with wart     Erythematous eczematous patches and plaques     Dystrophic onycholytic nail with subungual debris c/w onychomycosis     Umbilicated papule    Erythematous-base heme-crusted tan verrucoid plaque consistent with inflamed seborrheic keratosis     Erythematous Silvery Scaling Plaque c/w Psoriasis     See annotation    [] Data reviewed  [] Prior external notes reviewed  [] Independent review of test  [] Management discussed with another provider  [] Independent historian    Assessment / Plan:        Melasma  - chronic problem, with exacerbation/progression  Discussed hormonal, solar, and genetic etiology. Emphasized difficulty in treatment and maintaining improvement.   Discussed importance of diligent daily sun protection with a tinted, broad-spectrum sunscreen with SPF 50+.    -     hydroquinone 8 % Emul; Compound hydroquinone 8% / tretinoin 0.025% / kojic acid 1% / niacinamide 4% / fluocinolone 0.025% cream. Apply a pea-sized amount to face qhs. Do not use for longer than 16 weeks in a row.  Dispense: 30 g; Refill: 1    Examples of tinted SPF that contain zinc and/or titanium:  Tarte BB Tinted Treatment 12 Hour Primer SPF 30  DARIUS Super Serum Skin Tint SPF 40  Supergoop CC Screen 100% Mineral CC Cream SPF 50  Colorescience Sunforgettable Total  Protection Face Shield Flex (liquid SPF 50)    Colorescience Sunforgettable Total Protection Brush-On Shield (powder SPF 50)  Avene Mineral Tinted Compact SPF 50  La Roche-Posay Anthelios Tinted SPF 50      Follow up in about 3 months (around 5/20/2024).      Jody Mackenzie MD, FAAD  [facility]

## 2024-02-20 NOTE — PATIENT INSTRUCTIONS
Your prescription has been sent to the compound[facility]  They are located in [place] at [address] just before the [address].  If you have any questions, please call the pharmacy at [phone].  Website: [email or web address]       Examples of tinted SPF that contain zinc and/or titanium:  Tarte BB Tinted Treatment 12 Hour Primer SPF 30  DARIUS Super Serum Skin Tint SPF 40  Supergoop CC Screen 100% Mineral CC Cream SPF 50  Colorescience Sunforgettable Total Protection Face Shield Flex (liquid SPF 50)    Colorescience Sunforgettable Total Protection Brush-On Shield (powder SPF 50)  Avene Mineral Tinted Compact SPF 50  La Roche-Posay Anthelios Tinted SPF 50

## 2024-03-18 ENCOUNTER — HOSPITAL ENCOUNTER (OUTPATIENT)
Dept: RADIOLOGY | Facility: HOSPITAL | Age: 64
Discharge: HOME | End: 2024-03-18
Attending: INTERNAL MEDICINE
Payer: COMMERCIAL

## 2024-03-18 VITALS — WEIGHT: 235 LBS | BODY MASS INDEX: 37.93 KG/M2

## 2024-03-18 DIAGNOSIS — Z12.31 ENCOUNTER FOR SCREENING MAMMOGRAM FOR BREAST CANCER: ICD-10-CM

## 2024-03-18 PROCEDURE — 77067 SCR MAMMO BI INCL CAD: CPT | Mod: 26,,, | Performed by: RADIOLOGY

## 2024-03-18 PROCEDURE — 77067 SCR MAMMO BI INCL CAD: CPT | Mod: TC

## 2024-03-18 PROCEDURE — 77063 BREAST TOMOSYNTHESIS BI: CPT | Mod: 26,,, | Performed by: RADIOLOGY

## 2024-03-21 ENCOUNTER — PATIENT MESSAGE (OUTPATIENT)
Dept: INTERNAL MEDICINE | Facility: CLINIC | Age: 64
End: 2024-03-21
Payer: COMMERCIAL

## 2024-04-23 RX ORDER — MELOXICAM 15 MG/1
15 TABLET ORAL DAILY
Qty: 30 TABLET | Refills: 1 | Status: SHIPPED | OUTPATIENT
Start: 2024-04-23

## 2024-05-22 ENCOUNTER — TELEPHONE (OUTPATIENT)
Dept: ORTHOPEDICS | Facility: CLINIC | Age: 64
End: 2024-05-22
Payer: COMMERCIAL

## 2024-05-22 NOTE — TELEPHONE ENCOUNTER
Tanner with name and call back number    ----- Message from Aaron Larson sent at 5/22/2024  9:54 AM CDT -----  Regarding: PT WAS SEEN 7/2023 BY  AND WAS TOLD SHE NEEDED A LEFT KNEE REPLACEMENT  Contact: pt  Pt is finally ready to schedule but  is no longer doing surgery    Confirmed contact info below:  Contact Name: Albertina Sim  Phone Number: [phone]

## 2024-05-22 NOTE — TELEPHONE ENCOUNTER
Called and spoke to pt regarding apt to discuss surgery. Pt advised last visit with Dr Gonzalez she would reach out when ready to discuss surgery. Pt is now ready, pt accepted first available with Dr Cardenas. Pt had no further questions at this time.     ----- Message from Konrad Morel MA sent at 5/22/2024 12:28 PM CDT -----  Regarding: returning call  Contact: pt at  [phone]  Pt is calling to speak with someone in provider office is asking for a return call from   bryson please call pt at  [phone]

## 2024-05-22 NOTE — TELEPHONE ENCOUNTER
[facility] with name and call back number    ----- Message from Aaron Larson sent at 5/22/2024  9:49 AM CDT -----  Regarding: PT WAS SEEN 7/2023 PT WAS TOLD SHE NEEDED SURGERY BUT WASN'T READY PT IS WANTING TO NOW GO THROUGH WITH IT  Contact: pt  Right knee replacement, bone on bone     Confirmed contact info below:  Contact Name: Albertina Sim  Phone Number: [phone]

## 2024-06-10 ENCOUNTER — PATIENT MESSAGE (OUTPATIENT)
Dept: INTERNAL MEDICINE | Facility: CLINIC | Age: 64
End: 2024-06-10
Payer: COMMERCIAL

## 2024-06-20 ENCOUNTER — PATIENT MESSAGE (OUTPATIENT)
Dept: ORTHOPEDICS | Facility: CLINIC | Age: 64
End: 2024-06-20
Payer: COMMERCIAL

## 2024-06-20 DIAGNOSIS — M17.11 PRIMARY OSTEOARTHRITIS OF RIGHT KNEE: Primary | ICD-10-CM

## 2024-07-09 ENCOUNTER — HOSPITAL ENCOUNTER (OUTPATIENT)
Dept: RADIOLOGY | Facility: HOSPITAL | Age: 64
Discharge: HOME | End: 2024-07-09
Attending: ORTHOPAEDIC SURGERY
Payer: COMMERCIAL

## 2024-07-09 ENCOUNTER — OFFICE VISIT (OUTPATIENT)
Dept: ORTHOPEDICS | Facility: CLINIC | Age: 64
End: 2024-07-09
Payer: COMMERCIAL

## 2024-07-09 VITALS — BODY MASS INDEX: 36.93 KG/M2 | HEIGHT: 66 IN | WEIGHT: 229.81 LBS

## 2024-07-09 DIAGNOSIS — M17.11 PRIMARY OSTEOARTHRITIS OF RIGHT KNEE: Primary | ICD-10-CM

## 2024-07-09 DIAGNOSIS — M17.11 PRIMARY OSTEOARTHRITIS OF RIGHT KNEE: ICD-10-CM

## 2024-07-09 PROCEDURE — 3288F FALL RISK ASSESSMENT DOCD: CPT | Mod: CPTII,S$GLB,, | Performed by: ORTHOPAEDIC SURGERY

## 2024-07-09 PROCEDURE — 73562 X-RAY EXAM OF KNEE 3: CPT | Mod: TC,LT

## 2024-07-09 PROCEDURE — 1159F MED LIST DOCD IN RCRD: CPT | Mod: CPTII,S$GLB,, | Performed by: ORTHOPAEDIC SURGERY

## 2024-07-09 PROCEDURE — 73564 X-RAY EXAM KNEE 4 OR MORE: CPT | Mod: 26,RT,, | Performed by: RADIOLOGY

## 2024-07-09 PROCEDURE — 3008F BODY MASS INDEX DOCD: CPT | Mod: CPTII,S$GLB,, | Performed by: ORTHOPAEDIC SURGERY

## 2024-07-09 PROCEDURE — 99999 PR PBB SHADOW E&M-EST. PATIENT-LVL III: CPT | Mod: PBBFAC,,, | Performed by: ORTHOPAEDIC SURGERY

## 2024-07-09 PROCEDURE — 73562 X-RAY EXAM OF KNEE 3: CPT | Mod: 26,59,LT, | Performed by: RADIOLOGY

## 2024-07-09 PROCEDURE — 1101F PT FALLS ASSESS-DOCD LE1/YR: CPT | Mod: CPTII,S$GLB,, | Performed by: ORTHOPAEDIC SURGERY

## 2024-07-09 PROCEDURE — 73564 X-RAY EXAM KNEE 4 OR MORE: CPT | Mod: TC,RT

## 2024-07-09 PROCEDURE — 1126F AMNT PAIN NOTED NONE PRSNT: CPT | Mod: CPTII,S$GLB,, | Performed by: ORTHOPAEDIC SURGERY

## 2024-07-09 PROCEDURE — 99215 OFFICE O/P EST HI 40 MIN: CPT | Mod: S$GLB,,, | Performed by: ORTHOPAEDIC SURGERY

## 2024-07-09 NOTE — PROGRESS NOTES
"Subjective:     HPI:   Albertina Sim is a 64 y.o. female who presents for eval R knee rebekah Gonzalez    History of Present Illness  The patient presents for evaluation of knee pain.    The patient is generally in good health with no significant cardiac, pulmonary, or hepatic conditions. She has a sickle cell trait, however, it is not indicative of sickle cell disease. She has been supplementing with over-the-counter vitamin D and initiated Wegovy therapy approximately 4 weeks ago. She initiated an exercise program approximately 5 weeks ago and has lost approximately 20 pounds since 05/2023. She has no history of myocardial infarction or cardiac stents. She has no history of knee surgeries. She has no history of thromboembolic events in her legs or lungs. She has been experiencing knee pain for over 10 years, which initially manifested as swelling during ambulation. She was diagnosed with knee inflammation and fluid accumulation. Previous attempts at pain management with injections and physical therapy were unsuccessful. Her primary care physician, Dr. Barr, has advised against surgical intervention if the knee is not causing significant discomfort. The pain is primarily localized to the inner aspect of the knee, with intermittent radiation downwards. Dr. Barr has previously discussed surgical intervention and timing. The patient lives alone and her niece will assist her post-surgery. She has a wedding in 09/2023 and is contemplating surgery post-niece's wedding in [place].    R knee x several years  10+ years  Putting off for years was under impression from non-ortho providers that TKA would only last 5 years  Global, medial      7/20/2023 saw Dr. Gonzalez for both knees: "bilateral knee pain (right > left) that has been present for multiple years.  Pain is worse with activity, most notably while going upstairs.  She is been taking Aleve every morning and reports that his provided minimal relief.  Has previously tried " "injections and states that they provide around 1 month of relief.  Ambulates without assistance at baseline and reports she is able to walk > 1 mile before needing to rest due to pain.  She works at [employer] and states that she would need to finish the semester prior to considering surgery.   Patient would like to complete the upcoming school semester prior to surgery, therefore she was recommended to contact [facility] in October to schedule primary right total knee arthroplasty. "    Past surgical history: None     Hx DVT: None     Medications: Aleve (220mg, PRN)    Injections: +5 years ago had right knee iaCSI, minimal relief for 1 week     Physical Therapy: Yes completed OP-PT +5 years ago, the patient stated that it did not help.   - Doing a virtual exercise program through [facility]     Bracing: None.     Assistive Devices: None.     Walkin - 5 blocks    Limitations:  difficulty walking, difficulty with stairs, ADLs      Occupation: The patient is the  for [employer] in the [employer] at [employer]     Social support: The patient stated that they live at home alone. The patient stated that she would need to find someone to help take care of them if they were to have surgery.   -niece in town unsure if she is available  -no children  - no other family in the city     ROS:  The updated medical history is in the chart and has been reviewed. A review of systems is updated and there is no reported vision changes, ear/nose/mouth/throat complaints,  chest pain, shortness of breath, abdominal pain, urological complaints, fevers or chills, psychiatric complaints. Musculoskeletal and neurologcial symptoms are as documented. All other systems are negative.      Objective:   Exam:  There were no vitals filed for this visit.  Body mass index is 37.1 kg/m².    Physical examination included assessment of the patient's general appearance with particular attention " to development, nutrition, body habitus, attention to grooming, and any evidence of distress.  Constitutional: The patient is a well-developed, well-nourished patient in no acute distress.   Cardiovascular: Vascular examination included warmth and capillary refill as well inspection for edema and assessment of pedal pulses. Pulses are palpable and regular.  Musculoskeletal: Gait was assessed as to whether it was steady, non-antalgic, and/or required the use of an assist device. The patient was also asked to walk independently and get onto the examination table.  Skin: The skin was examined for any obvious rashes or lesions in the extremity.  Neurologic: Sensation is intact to light touch in the extremity. The patient has good coordination without hyperreflexia and is alert and oriented to person, place and time and has normal mood and affect.     All of the above were examined and found to be within normal limits except for the following pertinent clinical findings:    Physical Exam  Antalgic gait with a limp favoring the right knee, slight varus thrust. No groin pain with extreme leg raise. No pain with expressive range of motion of her hip joint. Distally neurovascularly intact with good strength, sensation, and pulses. Knee range of motion is 5 to 105 degrees. Alignment is 0 degrees, which does correct into valgus. Tender to palpation at the medial, lateral, and patellofemoral joint lines with mild to moderate effusion. Knee is stable to anterior, posterior, varus and valgus stresses with flexion contracture, but no extensor lag.          Imaging:    Results  Imaging  X-ray of the knee shows bone on bone arthritis with large bone spurs and wear and tear behind the kneecap.    KNEE R ARTHRITIS    Indication:  Right knee pain  Exam Ordered: Radiographs of the right knee include a standing anteroposterior view, a standing posterioanterior view, a lateral view in full flexion, and a sunrise view  Details of  Examination: Exam shows evidence of joint space narrowing, osteophyte formation, and subchondral sclerosis, all consistent with degenerative arthritis of the knee.  No other significant findings are noted.  Impression:  Degenerative Arthritis, Right Knee    Klg4 varus R knee OA severe bone on bone      Assessment:       ICD-10-CM ICD-9-CM   1. Primary osteoarthritis of right knee  M17.11 715.16        BMI 37  Anemia, Sickle trait, 11.8  Vit D Def, on OTC vit D, 43 2019  CKD 1.1/55.8  Wegovy, exercise, lost 20lbs since may    Plan:     Assessment & Plan  1. Knee arthritis.  The patient presents with severe bone-on-bone arthritis and wear and tear behind the kneecap, indicative of severe arthritis. A knee replacement resurfacing has been proposed as the most suitable course of action. Preoperative medical clearance process has been established, with the patient being evaluated by our medical team and undergoing blood work to ensure cardiac and pulmonary health is satisfactory. A joint replacement class has also been provided. The patient has been provided with information regarding knee replacements and the robot. A one-night hospital stay has been recommended, followed by home therapy for the initial 2 weeks. Post-surgery, home therapy will be arranged for a 2-week period, followed by a transition to outpatient therapy. The patient has been advised against receiving injections within 90 days of surgery.    The above findings were discussed with patient length. We discussed the risks of conservative versus surgical management knee arthritis. Conservative management consisting of anti-inflammatory medications, glucosamine/chondroitin sulfate, weight loss, physical therapy, activity modification, as well as injections (lubricant versus corticosteroid) was discussed at length. At this point considering the patient's level of activity, pain, and radiographic findings I recommend TKA    This patient has significant  symptoms in their knee that are affecting their quality of life and daily activities.  They have tried non-operative treatment including analgesics, an exercise program, and activity modification, but the symptoms have persisted. I believe they make a good candidate for knee arthroplasty.     Implants:   Company: [employer]  System: Attune  Velys all poly     DVT prophylaxis: ASA 81mg BID x1 month    Dispo:  HH PT    Admission status: Outpatient/23hr OBS    Location: [facility]   1 night, limited social support, needs to coordinate with niece   Fall - after October, wedding end of September in [place], job busy with school starting  F/u pre-op visit  TKA + velys info today    No orders of the defined types were placed in this encounter.      This note was generated with the assistance of ambient listening technology. Verbal consent was obtained by the patient and accompanying visitor(s) for the recording of patient appointment to facilitate this note. I attest to having reviewed and edited the generated note for accuracy, though some syntax or spelling errors may persist. Please contact the author of this note for any clarification.            Past Medical History:   Diagnosis Date    Arthritis     Sickle cell trait     Vitamin D deficiency        Past Surgical History:   Procedure Laterality Date    COLONOSCOPY N/A 2/14/2023    Procedure: COLONOSCOPY;  Surgeon: Alessandro Cedeño MD;  Location: [facility] ([address] [place] [address]);  Service: Endoscopy;  Laterality: N/A;  instr emailed-ml  2/7/23 pre call amalia - marychuy       Family History   Problem Relation Name Age of Onset    Atrial fibrillation Mother Rosemarie Sim         dementia, 91    Hypertension Mother Rosemarie Sim     Arthritis Mother Rosemarie Sim     Heart disease Mother Rosemarie Sim     Cancer Father Tushar Sim         lung, 90    Glaucoma Father Tushar Sim     Stroke Brother      Cancer Sister Perri     Cancer Brother Garo     Stroke Brother Vince Workman         Social History     Socioeconomic History    Marital status: Single   Occupational History     Employer: [employer]   Tobacco Use    Smoking status: Never    Smokeless tobacco: Never   Substance and Sexual Activity    Alcohol use: Not Currently     Comment: rare    Drug use: No    Sexual activity: Not Currently   Social History Narrative    At [employer], 10 years in May. Really enjoys her employment.  [place].    Before the [place] in [place] For 15 years, really tied her down, got washed out by [employer]    Both parents passed    Stopped riding bike to work this summer b/o heat    Job stressed, several people left dept.    She walks her Stanley dog twice daily.    She's doing yoga and enjoying the relaxation.    Single, no children.    Youngest in her family of origin. Grieving the death of her sister who  of a brain tumor, at the age of 64.    She was a twin and her twin sister, identical twin sister and now has memory problems.        No current physical activity.     Social Determinants of Health     Financial Resource Strain: Low Risk  (3/31/2023)    Overall Financial Resource Strain (CARDIA)     Difficulty of Paying Living Expenses: Not hard at all   Food Insecurity: No Food Insecurity (3/31/2023)    Hunger Vital Sign     Worried About Running Out of Food in the Last Year: Never true     Ran Out of Food in the Last Year: Never true   Transportation Needs: No Transportation Needs (3/31/2023)    PRAPARE - Transportation     Lack of Transportation (Medical): No     Lack of Transportation (Non-Medical): No   Physical Activity: Insufficiently Active (3/31/2023)    Exercise Vital Sign     Days of Exercise per Week: 1 day     Minutes of Exercise per Session: 20 min   Stress: No Stress Concern Present (3/31/2023)    [place] [employer] - Occupational Stress Questionnaire     Feeling of  Stress : Not at all   Housing Stability: Low Risk  (3/31/2023)    Housing Stability Vital Sign     Unable to Pay for Housing in the Last Year: No     Number of Places Lived in the Last Year: 1     Unstable Housing in the Last Year: No

## 2024-07-29 ENCOUNTER — OFFICE VISIT (OUTPATIENT)
Dept: INTERNAL MEDICINE | Facility: CLINIC | Age: 64
End: 2024-07-29
Payer: COMMERCIAL

## 2024-07-29 ENCOUNTER — LAB VISIT (OUTPATIENT)
Dept: LAB | Facility: HOSPITAL | Age: 64
End: 2024-07-29
Payer: COMMERCIAL

## 2024-07-29 VITALS
HEART RATE: 85 BPM | WEIGHT: 222.69 LBS | OXYGEN SATURATION: 98 % | SYSTOLIC BLOOD PRESSURE: 125 MMHG | HEIGHT: 66 IN | DIASTOLIC BLOOD PRESSURE: 82 MMHG | BODY MASS INDEX: 35.79 KG/M2

## 2024-07-29 DIAGNOSIS — D57.3 SICKLE CELL TRAIT: ICD-10-CM

## 2024-07-29 DIAGNOSIS — N18.31 CHRONIC KIDNEY DISEASE, STAGE 3A: ICD-10-CM

## 2024-07-29 DIAGNOSIS — Z00.00 ANNUAL PHYSICAL EXAM: Primary | ICD-10-CM

## 2024-07-29 DIAGNOSIS — E66.9 CLASS 2 OBESITY WITHOUT SERIOUS COMORBIDITY WITH BODY MASS INDEX (BMI) OF 35.0 TO 35.9 IN ADULT, UNSPECIFIED OBESITY TYPE: ICD-10-CM

## 2024-07-29 LAB
ALBUMIN SERPL BCP-MCNC: 3.9 G/DL (ref 3.5–5.2)
ALP SERPL-CCNC: 79 U/L (ref 55–135)
ALT SERPL W/O P-5'-P-CCNC: 13 U/L (ref 10–44)
ANION GAP SERPL CALC-SCNC: 9 MMOL/L (ref 8–16)
AST SERPL-CCNC: 20 U/L (ref 10–40)
BASOPHILS # BLD AUTO: 0.03 K/UL (ref 0–0.2)
BASOPHILS NFR BLD: 0.4 % (ref 0–1.9)
BILIRUB SERPL-MCNC: 0.8 MG/DL (ref 0.1–1)
BUN SERPL-MCNC: 14 MG/DL (ref 8–23)
CALCIUM SERPL-MCNC: 10.7 MG/DL (ref 8.7–10.5)
CHLORIDE SERPL-SCNC: 106 MMOL/L (ref 95–110)
CHOLEST SERPL-MCNC: 187 MG/DL (ref 120–199)
CHOLEST/HDLC SERPL: 3.3 {RATIO} (ref 2–5)
CO2 SERPL-SCNC: 25 MMOL/L (ref 23–29)
CREAT SERPL-MCNC: 1.1 MG/DL (ref 0.5–1.4)
DIFFERENTIAL METHOD BLD: NORMAL
EOSINOPHIL # BLD AUTO: 0.2 K/UL (ref 0–0.5)
EOSINOPHIL NFR BLD: 2.5 % (ref 0–8)
ERYTHROCYTE [DISTWIDTH] IN BLOOD BY AUTOMATED COUNT: 14.5 % (ref 11.5–14.5)
EST. GFR  (NO RACE VARIABLE): 55.4 ML/MIN/1.73 M^2
ESTIMATED AVG GLUCOSE: 108 MG/DL (ref 68–131)
GLUCOSE SERPL-MCNC: 89 MG/DL (ref 70–110)
HBA1C MFR BLD: 5.4 % (ref 4–5.6)
HCT VFR BLD AUTO: 37 % (ref 37–48.5)
HDLC SERPL-MCNC: 57 MG/DL (ref 40–75)
HDLC SERPL: 30.5 % (ref 20–50)
HGB BLD-MCNC: 12.2 G/DL (ref 12–16)
IMM GRANULOCYTES # BLD AUTO: 0.01 K/UL (ref 0–0.04)
IMM GRANULOCYTES NFR BLD AUTO: 0.1 % (ref 0–0.5)
LDLC SERPL CALC-MCNC: 119.8 MG/DL (ref 63–159)
LYMPHOCYTES # BLD AUTO: 1.8 K/UL (ref 1–4.8)
LYMPHOCYTES NFR BLD: 27.2 % (ref 18–48)
MCH RBC QN AUTO: 29.3 PG (ref 27–31)
MCHC RBC AUTO-ENTMCNC: 33 G/DL (ref 32–36)
MCV RBC AUTO: 89 FL (ref 82–98)
MONOCYTES # BLD AUTO: 0.7 K/UL (ref 0.3–1)
MONOCYTES NFR BLD: 9.9 % (ref 4–15)
NEUTROPHILS # BLD AUTO: 4.1 K/UL (ref 1.8–7.7)
NEUTROPHILS NFR BLD: 59.9 % (ref 38–73)
NONHDLC SERPL-MCNC: 130 MG/DL
NRBC BLD-RTO: 0 /100 WBC
PLATELET # BLD AUTO: 232 K/UL (ref 150–450)
PMV BLD AUTO: 11 FL (ref 9.2–12.9)
POTASSIUM SERPL-SCNC: 4.5 MMOL/L (ref 3.5–5.1)
PROT SERPL-MCNC: 7.5 G/DL (ref 6–8.4)
RBC # BLD AUTO: 4.16 M/UL (ref 4–5.4)
SODIUM SERPL-SCNC: 140 MMOL/L (ref 136–145)
TRIGL SERPL-MCNC: 51 MG/DL (ref 30–150)
TSH SERPL DL<=0.005 MIU/L-ACNC: 1.44 UIU/ML (ref 0.4–4)
WBC # BLD AUTO: 6.77 K/UL (ref 3.9–12.7)

## 2024-07-29 PROCEDURE — 1126F AMNT PAIN NOTED NONE PRSNT: CPT | Mod: CPTII,S$GLB,, | Performed by: INTERNAL MEDICINE

## 2024-07-29 PROCEDURE — 36415 COLL VENOUS BLD VENIPUNCTURE: CPT | Performed by: INTERNAL MEDICINE

## 2024-07-29 PROCEDURE — 84443 ASSAY THYROID STIM HORMONE: CPT | Performed by: INTERNAL MEDICINE

## 2024-07-29 PROCEDURE — 99397 PER PM REEVAL EST PAT 65+ YR: CPT | Mod: S$GLB,,, | Performed by: INTERNAL MEDICINE

## 2024-07-29 PROCEDURE — 99999 PR PBB SHADOW E&M-EST. PATIENT-LVL III: CPT | Mod: PBBFAC,,, | Performed by: INTERNAL MEDICINE

## 2024-07-29 PROCEDURE — 83036 HEMOGLOBIN GLYCOSYLATED A1C: CPT | Performed by: INTERNAL MEDICINE

## 2024-07-29 PROCEDURE — 80061 LIPID PANEL: CPT | Performed by: INTERNAL MEDICINE

## 2024-07-29 PROCEDURE — 3074F SYST BP LT 130 MM HG: CPT | Mod: CPTII,S$GLB,, | Performed by: INTERNAL MEDICINE

## 2024-07-29 PROCEDURE — 3079F DIAST BP 80-89 MM HG: CPT | Mod: CPTII,S$GLB,, | Performed by: INTERNAL MEDICINE

## 2024-07-29 PROCEDURE — 3008F BODY MASS INDEX DOCD: CPT | Mod: CPTII,S$GLB,, | Performed by: INTERNAL MEDICINE

## 2024-07-29 PROCEDURE — 1101F PT FALLS ASSESS-DOCD LE1/YR: CPT | Mod: CPTII,S$GLB,, | Performed by: INTERNAL MEDICINE

## 2024-07-29 PROCEDURE — 85025 COMPLETE CBC W/AUTO DIFF WBC: CPT | Performed by: INTERNAL MEDICINE

## 2024-07-29 PROCEDURE — 80053 COMPREHEN METABOLIC PANEL: CPT | Performed by: INTERNAL MEDICINE

## 2024-07-29 PROCEDURE — 3288F FALL RISK ASSESSMENT DOCD: CPT | Mod: CPTII,S$GLB,, | Performed by: INTERNAL MEDICINE

## 2024-07-29 RX ORDER — SEMAGLUTIDE 0.5 MG/.5ML
0.5 INJECTION, SOLUTION SUBCUTANEOUS WEEKLY
COMMUNITY

## 2024-07-29 NOTE — PROGRESS NOTES
Subjective:       Patient ID: Albertina Sim is a 64 y.o. female.    Chief Complaint: Annual Exam    HPI    Presents  for annual exam.     Feeling well today. No new complaints     She has been participating in a weight loss program through her employer [employer]. She is receiving wegovy injections weekly. Currently on .5 mg weekly and tolerating well. Has lost 20 Ibs since last year     PMHx    OA of right knee. Following with ortho, started meloxicam. Plan is for knee replacement in October.     Was having chest pain in April 2023, cardiac eval was negative. Had CT calcium score which was zero.     Sickle cell traint: hemoglobin ~ 10-11 at baseline     Health Maintenance:  Colon Cancer Screening: colonoscopy in 2023 with polyps removed. Due in 2028.   Mammogram: normal March 2024   DEXA: normal July 2023. No need for repeat.   HIV: negative 2023   Hep C: negative 2023   Lipids: order today   Vaccines:  up to date.       Review of Systems   Constitutional:  Negative for activity change, appetite change and chills.   HENT:  Negative for ear pain, sinus pressure/congestion and sneezing.    Respiratory:  Negative for cough and shortness of breath.    Cardiovascular:  Negative for chest pain, palpitations and leg swelling.   Gastrointestinal:  Negative for abdominal distention, abdominal pain, constipation, diarrhea, nausea and vomiting.   Genitourinary:  Negative for dysuria and hematuria.   Musculoskeletal:  Negative for arthralgias, back pain and myalgias.   Neurological:  Negative for dizziness and headaches.   Psychiatric/Behavioral:  Negative for agitation. The patient is not nervous/anxious.            Past Medical History:   Diagnosis Date    Arthritis     Sickle cell trait     Vitamin D deficiency      Past Surgical History:   Procedure Laterality Date    COLONOSCOPY N/A 2/14/2023    Procedure: COLONOSCOPY;  Surgeon: Alessandro Cedeño MD;  Location: [facility] ([address] [place] [address]);  Service: Endoscopy;  Laterality: N/A;  instr  emailed-deb  2/7/23 pre call complete - marychuy      Patient Active Problem List   Diagnosis    Hallux valgus (acquired)    DJD (degenerative joint disease) of knee    Sciatica of right side    Anemia, sickle trait, documented since 2005    Sickle cell trait    Class 2 severe obesity due to excess calories with serious comorbidity and body mass index (BMI) of 37.0 to 37.9 in adult    Chronic midline low back pain without sciatica    Primary osteoarthritis of left knee    Chronic pain of left knee    Elevated blood pressure reading    Vitamin D deficiency    Nocturnal leg cramps    Screen for colon cancer, next 03/2022    Atypical chest pain    ARRIETA (dyspnea on exertion)    Hypercholesterolemia    Nonspecific abnormal electrocardiogram (ECG) (EKG)    Abnormal chest x-ray    Primary osteoarthritis of right knee    Chronic kidney disease, stage 3a        Objective:      Physical Exam  Constitutional:       Appearance: Normal appearance.   HENT:      Head: Normocephalic.      Right Ear: Tympanic membrane normal.      Left Ear: Tympanic membrane normal.      Nose: Nose normal.   Cardiovascular:      Rate and Rhythm: Normal rate and regular rhythm.      Pulses: Normal pulses.      Heart sounds: Normal heart sounds.   Pulmonary:      Effort: Pulmonary effort is normal.      Breath sounds: Normal breath sounds.   Abdominal:      General: Abdomen is flat. Bowel sounds are normal.      Palpations: Abdomen is soft.   Musculoskeletal:         General: Normal range of motion.      Cervical back: Normal range of motion and neck supple.   Skin:     General: Skin is warm and dry.   Neurological:      General: No focal deficit present.      Mental Status: She is alert and oriented to person, place, and time.   Psychiatric:         Mood and Affect: Mood normal.         Assessment:       Problem List Items Addressed This Visit    None        Plan:         Albertina was seen today for establish care.    Annual physical exam  Colon Cancer  Screening: colonoscopy in 2023 with polyps removed. Due in 2028.   Mammogram: normal March 2024   DEXA: normal July 2023. No need for repeat.   HIV: negative 2023   Hep C: negative 2023   Lipids: order today   Vaccines:  up to date.     Annual wellness exam completed.     All medications, histories, and concerns reviewed, reconciled, and addressed.     Appropriate Screenings per pt's sex and age have been reviewed and discussed with pt.       Chronic kidney disease, stage 3a  -     Comprehensive Metabolic Panel; Future; Expected date: 07/29/2024  Slightly low GFR on last labs.   Check today     Sickle cell trait  -     CBC Auto Differential; Future; Expected date: 07/29/2024  Hemoglobin stable over the last 10 years.     Class 2 obesity without serious comorbidity with body mass index (BMI) of 35.0 to 35.9 in adult, unspecified obesity type  Continue wegovy through employee program. Doing well. Has lost 20 Ibs.               Angelia Roldan MD   Internal Medicine   Primary Care

## 2024-08-26 DIAGNOSIS — M17.11 PRIMARY OSTEOARTHRITIS OF RIGHT KNEE: Primary | ICD-10-CM

## 2024-08-28 ENCOUNTER — TELEPHONE (OUTPATIENT)
Dept: PREADMISSION TESTING | Facility: HOSPITAL | Age: 64
End: 2024-08-28
Payer: COMMERCIAL

## 2024-08-28 DIAGNOSIS — M79.609 PAIN IN EXTREMITY, UNSPECIFIED EXTREMITY: Primary | ICD-10-CM

## 2024-08-28 NOTE — ANESTHESIA PAT ROS NOTE
08/28/2024  Albertina Sim is a 64 y.o., female.      Pre-op Assessment          Review of Systems           Anesthesia Assessment: Preoperative EQUATION    Planned Procedure: Procedure(s) (LRB):  ARTHROPLASTY, KNEE, TOTAL, USING [employer] COMPUTER-ASSISTED NAVIGATION: RIGHT: DEPUY - ATTUNE (Right)  Requested Anesthesia Type:Regional  Surgeon: Ramses Cardenas III, MD  Service: Orthopedics  Known or anticipated Date of Surgery:10/7/2024    Surgeon notes: reviewed    Electronic QUestionnaire Assessment completed via nurse interview with patient.        Triage considerations:     The patient has no apparent active cardiac condition (No unstable coronary Syndrome such as severe unstable angina or recent [<1 month] myocardial infarction, decompensated CHF, severe valvular   disease or significant arrhythmia)    Previous anesthesia records:GETA and No problems  2/14/23  COLONOSCOPY   Airway:  Mallampati: III / II  Mouth Opening: Normal  TM Distance: Normal  Tongue: Normal  Neck ROM: Normal ROM    Last PCP note: within 1 month , within [facility]   Subspecialty notes: Cardiology: General, Dermatology, Ortho    Other important co-morbidities: Obesity and CKD3, Anemia, Sickle Cell Trait, Wegovy for weight loss, Vit D Def       Tests already available:  Available tests,  within [facility] .   7/29/24 TSH, A1C, CBC, CMP  4/3/23 CT Cardiac Scoring  3/7/23 EKG            Instructions given. (See in Nurse's note)    Optimization:  Anesthesia Preop Clinic Assessment  Indicated POC    Medical Opinion Indicated       Sub-specialist consult indicated:   [facility] NP     Plan:    Testing:  PT/INR   Pre-anesthesia  visit       Visit focus: possible regional anesthesia and/or nerve block      Consultation:Pre Op Center NP for medical and anesthesia optimization       Patient  has previously scheduled Medical Appointment:  9/20    Navigation: Tests Scheduled.              Consults scheduled.             Results will be tracked by Preop Clinic.      9/20/24 Patient is optimized for surgery.         Dede Colón NP  Perioperative Medicine  [facility]

## 2024-09-19 ENCOUNTER — TELEPHONE (OUTPATIENT)
Dept: INTERNAL MEDICINE | Facility: CLINIC | Age: 64
End: 2024-09-19
Payer: COMMERCIAL

## 2024-09-19 NOTE — HPI
This is a 64 y.o. female  who presents today for a preoperative evaluation in preparation for right knee arthroplasty.  Surgery is indicated for  osteoarthritis of right knee .   Patient is new to me.  The history has been obtained by speaking with the patient and reviewing the electronic medical record and/or outside health information. Significant health conditions for the perioperative period are discussed below in assessment and plan.   Patient reports current health status to be Good.  Denies any new symptoms before surgery.

## 2024-09-19 NOTE — OUTPATIENT SUBJECTIVE & OBJECTIVE
"Outpatient Subjective & Objective      Chief Complaint: Preoperative evaulation, perioperative medical management, and complication reduction plan.     Functional Capacity: works out at [employer]- "builds muscles". Parked in garage and walked to [facility] without CP/SOB.       Anesthesia issues: None    Difficulty mouth opening: No    Steroid use in the last 12 months:  No    Dental Issues: None    Family anesthesia difficulty: None     Family Hx of Thrombosis: None    Past Medical History:   Diagnosis Date    Arthritis     Disorder of kidney and ureter     Hyperlipidemia     Sickle cell trait     Vitamin D deficiency          Past Medical History Pertinent Negatives:   Diagnosis Date Noted    Anxiety 09/20/2024    Asthma 09/20/2024    Cataract 10/27/2015    COPD (chronic obstructive pulmonary disease) 09/20/2024    Coronary artery disease 09/20/2024    Deep vein thrombosis 09/20/2024    Depression 09/20/2024    Diabetes mellitus 10/27/2015    Diabetes mellitus, type 2 09/20/2024    Glaucoma 10/27/2015    Hypertension 10/27/2015    Macular degeneration 10/27/2015    Myocardial infarction 09/20/2024    Pulmonary embolism 09/20/2024    Seizures 09/20/2024    Stroke 09/20/2024    Thyroid disease 09/20/2024         Past Surgical History:   Procedure Laterality Date    COLONOSCOPY N/A 2/14/2023    Procedure: COLONOSCOPY;  Surgeon: Alessandro Cedeño MD;  Location: [facility] ([address] [place] [address]);  Service: Endoscopy;  Laterality: N/A;  instr emailed-ml  2/7/23 pre call complete - marychuy       Review of Systems   Constitutional:  Negative for chills, fatigue, fever and unexpected weight change.   HENT:  Negative for congestion, hearing loss, rhinorrhea, sore throat, tinnitus and trouble swallowing.    Eyes:  Negative for visual disturbance.   Respiratory:  Negative for cough, chest tightness, shortness of breath and wheezing.    Cardiovascular:  Negative for chest pain, palpitations and leg swelling.   Gastrointestinal:  Negative for " "constipation and diarrhea.        Denies Fatty liver, Hepatitis   Genitourinary:  Negative for decreased urine volume, difficulty urinating, dysuria, frequency, hematuria and urgency.   Musculoskeletal:  Positive for arthralgias (right knee) and back pain (occasional). Negative for neck pain and neck stiffness.   Skin:  Negative for rash and wound.   Neurological:  Negative for dizziness, syncope, weakness, numbness and headaches.   Hematological:  Bruises/bleeds easily.   Psychiatric/Behavioral:  Negative for sleep disturbance and suicidal ideas.               VITALS  Visit Vitals  /75 (BP Location: Left arm, Patient Position: Sitting)   Pulse 77   Temp 98.2 °F (36.8 °C) (Oral)   Ht 5' 6" (1.676 m)   Wt 99.5 kg (219 lb 5.7 oz)   LMP 01/01/2006 (Approximate)   SpO2 98%   BMI 35.41 kg/m²          Physical Exam  Vitals reviewed.   Constitutional:       General: She is not in acute distress.     Appearance: She is well-developed. She is obese.   HENT:      Head: Normocephalic.      Nose: Nose normal.      Mouth/Throat:      Pharynx: No oropharyngeal exudate.   Eyes:      General:         Right eye: No discharge.         Left eye: No discharge.      Conjunctiva/sclera: Conjunctivae normal.      Pupils: Pupils are equal, round, and reactive to light.   Neck:      Thyroid: No thyromegaly.      Vascular: No carotid bruit or JVD.      Trachea: No tracheal deviation.   Cardiovascular:      Rate and Rhythm: Normal rate and regular rhythm.      Pulses:           Carotid pulses are 2+ on the right side and 2+ on the left side.       Dorsalis pedis pulses are 2+ on the right side and 2+ on the left side.        Posterior tibial pulses are 2+ on the right side and 2+ on the left side.      Heart sounds: Murmur (heard best at the apex) heard.      Systolic murmur is present with a grade of 2/6.   Pulmonary:      Effort: Pulmonary effort is normal. No respiratory distress.      Breath sounds: Normal breath sounds. No stridor. " No wheezing, rhonchi or rales.   Abdominal:      General: Bowel sounds are normal. There is no distension.      Palpations: Abdomen is soft.      Tenderness: There is no abdominal tenderness. There is no guarding.   Musculoskeletal:      Cervical back: Normal range of motion. No pain with movement.      Right lower leg: Edema (trace) present.      Left lower leg: Edema (trace) present.   Lymphadenopathy:      Cervical: No cervical adenopathy.   Skin:     General: Skin is warm and dry.      Capillary Refill: Capillary refill takes less than 2 seconds.      Findings: No erythema or rash.   Neurological:      Mental Status: She is alert and oriented to person, place, and time.   Psychiatric:         Behavior: Behavior normal.          Significant Labs:  Lab Results   Component Value Date    WBC 6.77 07/29/2024    HGB 12.2 07/29/2024    HCT 37.0 07/29/2024     07/29/2024    CHOL 187 07/29/2024    TRIG 51 07/29/2024    HDL 57 07/29/2024    ALT 13 07/29/2024    AST 20 07/29/2024     07/29/2024    K 4.5 07/29/2024     07/29/2024    CREATININE 1.1 07/29/2024    BUN 14 07/29/2024    CO2 25 07/29/2024    TSH 1.439 07/29/2024    HGBA1C 5.4 07/29/2024           EKG:   Results for orders placed or performed in visit on 03/07/23   EKG 12-lead    Collection Time: 03/07/23  4:28 PM    Narrative    Test Reason :     Vent. Rate : 066 BPM     Atrial Rate : 066 BPM     P-R Int : 152 ms          QRS Dur : 082 ms      QT Int : 396 ms       P-R-T Axes : 028 -17 004 degrees     QTc Int : 415 ms    Normal sinus rhythm  Low anterior forces  Abnormal ECG  When compared with ECG of 07-MAR-2023 15:42,  No significant change was found  Confirmed by Sarkis Sutton MD ([id number]) on 3/8/2023 2:30:33 PM    Referred By:             Confirmed By:Sarkis Sutton MD           Imaging   Calcium Score 4/3/23  SCORE SUMMARY     Your total calcium score is 0     Incidental findings: The heart size is normal.  The aorta and main pulmonary  artery are of normal caliber.  The coronary artery origins are conventional, and the system is likely right dominant.  The bones and upper abdominal structures are unremarkable.     Impression:     Your total calcium score is 0.  Very little coronary heart disease risk.        Electronically signed by: Denisha Aguayo  Date:                                            04/03/2023  Time:                                           17:51          Active Cardiac Conditions: None      Revised Cardiac Risk Index   High -Risk Surgery  Intraperitoneal; Intrathoracic; suprainguinal vascular Yes- + 1 No- 0   History of Ischemic Heart Disease   (Hx of MI/positive exercise test/current chest pain due to ischemia/use of nitrate therapy/EKG with pathological Q waves) Yes- + 1 No- 0   History of CHF  (Pulmonary edema/bilateral rales or S3 gallop/PND/CXR showing pulmonary vascular redistribution) Yes- + 1 No- 0   History of CVA   (Prior stroke or TIA) Yes- + 1 No- 0   Pre-operative treatment with insulin Yes- + 1 No- 0   Pre-operative creatinine > 2mg/dl Yes- + 1 No- 0   Total: 0      Risk Status:  Estimated risk of cardiac complications after non-cardiac surgery using the Revised Cardiac Risk Index for Preoperative risk is 3.9 %      ARISCAT (Canet) risk index: Low: 1.6% risk of post-op pulmonary complications; Intermediate: 13.3% risk of post-op pulmonary complications if surgery is > 3 hours.     American Society of Anesthesiologists Physical Status classification (ASA): 2             Outpatient Subjective & Objective

## 2024-09-19 NOTE — ASSESSMENT & PLAN NOTE
No changes in urine volume.     Most recent GFR:55.4    BUN=  14  Cr = 1.1  Encouraged to avoid NSAIDs, reduce salt intake, maintain adequate hydration, and exercise.      Tylenol as needed for pain    I  suggest monitoring renal function, input and output status jerrica-operatively. I  suggest avoiding nephrotoxic medication including NSAIDs, COX2 inhibitors, intravenous contrast agent,avoiding hypotension to prevent further renal impairment.

## 2024-09-19 NOTE — PROGRESS NOTES
"[facility] [address]  Progress Note    Patient Name: Albertina Sim  MRN: [id number]  Date of Evaluation- 09/20/2024  PCP- Angelia Roldan MD    Future cases for Albertina Sim [[id number]]       Case ID Status Date Time Manuel Procedure Provider Location    [id number] [facility] 10/7/2024  1:19  ARTHROPLASTY, KNEE, TOTAL, USING [employer] COMPUTER-ASSISTED NAVIGATION: RIGHT: DEPUY - ATTRamses Colin III, MD [[id number]] [facility] OR            HPI:  This is a 64 y.o. female  who presents today for a preoperative evaluation in preparation for right knee arthroplasty.  Surgery is indicated for  osteoarthritis of right knee .   Patient is new to me.  The history has been obtained by speaking with the patient and reviewing the electronic medical record and/or outside health information. Significant health conditions for the perioperative period are discussed below in assessment and plan.   Patient reports current health status to be Good.  Denies any new symptoms before surgery.       Subjective/ Objective:     Chief Complaint: Preoperative evaulation, perioperative medical management, and complication reduction plan.     Functional Capacity: works out at [employer]- "builds muscles". Parked in garage and walked to [facility] without CP/SOB.       Anesthesia issues: None    Difficulty mouth opening: No    Steroid use in the last 12 months:  No    Dental Issues: None    Family anesthesia difficulty: None     Family Hx of Thrombosis: None    Past Medical History:   Diagnosis Date    Arthritis     Disorder of kidney and ureter     Hyperlipidemia     Sickle cell trait     Vitamin D deficiency          Past Medical History Pertinent Negatives:   Diagnosis Date Noted    Anxiety 09/20/2024    Asthma 09/20/2024    Cataract 10/27/2015    COPD (chronic obstructive pulmonary disease) 09/20/2024    Coronary artery disease 09/20/2024    Deep vein thrombosis 09/20/2024    Depression 09/20/2024    Diabetes mellitus 10/27/2015    Diabetes mellitus, " "type 2 09/20/2024    Glaucoma 10/27/2015    Hypertension 10/27/2015    Macular degeneration 10/27/2015    Myocardial infarction 09/20/2024    Pulmonary embolism 09/20/2024    Seizures 09/20/2024    Stroke 09/20/2024    Thyroid disease 09/20/2024         Past Surgical History:   Procedure Laterality Date    COLONOSCOPY N/A 2/14/2023    Procedure: COLONOSCOPY;  Surgeon: Alessandro Cedeño MD;  Location: [facility] ([address] [place] [address]);  Service: Endoscopy;  Laterality: N/A;  instr emailed-ml  2/7/23 pre call complete - marychuy       Review of Systems   Constitutional:  Negative for chills, fatigue, fever and unexpected weight change.   HENT:  Negative for congestion, hearing loss, rhinorrhea, sore throat, tinnitus and trouble swallowing.    Eyes:  Negative for visual disturbance.   Respiratory:  Negative for cough, chest tightness, shortness of breath and wheezing.    Cardiovascular:  Negative for chest pain, palpitations and leg swelling.   Gastrointestinal:  Negative for constipation and diarrhea.        Denies Fatty liver, Hepatitis   Genitourinary:  Negative for decreased urine volume, difficulty urinating, dysuria, frequency, hematuria and urgency.   Musculoskeletal:  Positive for arthralgias (right knee) and back pain (occasional). Negative for neck pain and neck stiffness.   Skin:  Negative for rash and wound.   Neurological:  Negative for dizziness, syncope, weakness, numbness and headaches.   Hematological:  Bruises/bleeds easily.   Psychiatric/Behavioral:  Negative for sleep disturbance and suicidal ideas.               VITALS  Visit Vitals  /75 (BP Location: Left arm, Patient Position: Sitting)   Pulse 77   Temp 98.2 °F (36.8 °C) (Oral)   Ht 5' 6" (1.676 m)   Wt 99.5 kg (219 lb 5.7 oz)   LMP 01/01/2006 (Approximate)   SpO2 98%   BMI 35.41 kg/m²          Physical Exam  Vitals reviewed.   Constitutional:       General: She is not in acute distress.     Appearance: She is well-developed. She is obese.   HENT:      " Head: Normocephalic.      Nose: Nose normal.      Mouth/Throat:      Pharynx: No oropharyngeal exudate.   Eyes:      General:         Right eye: No discharge.         Left eye: No discharge.      Conjunctiva/sclera: Conjunctivae normal.      Pupils: Pupils are equal, round, and reactive to light.   Neck:      Thyroid: No thyromegaly.      Vascular: No carotid bruit or JVD.      Trachea: No tracheal deviation.   Cardiovascular:      Rate and Rhythm: Normal rate and regular rhythm.      Pulses:           Carotid pulses are 2+ on the right side and 2+ on the left side.       Dorsalis pedis pulses are 2+ on the right side and 2+ on the left side.        Posterior tibial pulses are 2+ on the right side and 2+ on the left side.      Heart sounds: Murmur (heard best at the apex) heard.      Systolic murmur is present with a grade of 2/6.   Pulmonary:      Effort: Pulmonary effort is normal. No respiratory distress.      Breath sounds: Normal breath sounds. No stridor. No wheezing, rhonchi or rales.   Abdominal:      General: Bowel sounds are normal. There is no distension.      Palpations: Abdomen is soft.      Tenderness: There is no abdominal tenderness. There is no guarding.   Musculoskeletal:      Cervical back: Normal range of motion. No pain with movement.      Right lower leg: Edema (trace) present.      Left lower leg: Edema (trace) present.   Lymphadenopathy:      Cervical: No cervical adenopathy.   Skin:     General: Skin is warm and dry.      Capillary Refill: Capillary refill takes less than 2 seconds.      Findings: No erythema or rash.   Neurological:      Mental Status: She is alert and oriented to person, place, and time.   Psychiatric:         Behavior: Behavior normal.          Significant Labs:  Lab Results   Component Value Date    WBC 6.77 07/29/2024    HGB 12.2 07/29/2024    HCT 37.0 07/29/2024     07/29/2024    CHOL 187 07/29/2024    TRIG 51 07/29/2024    HDL 57 07/29/2024    ALT 13 07/29/2024     AST 20 07/29/2024     07/29/2024    K 4.5 07/29/2024     07/29/2024    CREATININE 1.1 07/29/2024    BUN 14 07/29/2024    CO2 25 07/29/2024    TSH 1.439 07/29/2024    HGBA1C 5.4 07/29/2024           EKG:   Results for orders placed or performed in visit on 03/07/23   EKG 12-lead    Collection Time: 03/07/23  4:28 PM    Narrative    Test Reason :     Vent. Rate : 066 BPM     Atrial Rate : 066 BPM     P-R Int : 152 ms          QRS Dur : 082 ms      QT Int : 396 ms       P-R-T Axes : 028 -17 004 degrees     QTc Int : 415 ms    Normal sinus rhythm  Low anterior forces  Abnormal ECG  When compared with ECG of 07-MAR-2023 15:42,  No significant change was found  Confirmed by Sarkis Sutton MD ([id number]) on 3/8/2023 2:30:33 PM    Referred By:             Confirmed By:Sarkis Sutton MD           Imaging   Calcium Score 4/3/23  SCORE SUMMARY     Your total calcium score is 0     Incidental findings: The heart size is normal.  The aorta and main pulmonary artery are of normal caliber.  The coronary artery origins are conventional, and the system is likely right dominant.  The bones and upper abdominal structures are unremarkable.     Impression:     Your total calcium score is 0.  Very little coronary heart disease risk.        Electronically signed by: Denisha Aguayo  Date:                                            04/03/2023  Time:                                           17:51          Active Cardiac Conditions: None      Revised Cardiac Risk Index   High -Risk Surgery  Intraperitoneal; Intrathoracic; suprainguinal vascular Yes- + 1 No- 0   History of Ischemic Heart Disease   (Hx of MI/positive exercise test/current chest pain due to ischemia/use of nitrate therapy/EKG with pathological Q waves) Yes- + 1 No- 0   History of CHF  (Pulmonary edema/bilateral rales or S3 gallop/PND/CXR showing pulmonary vascular redistribution) Yes- + 1 No- 0   History of CVA   (Prior stroke or TIA) Yes- + 1 No- 0   Pre-operative  treatment with insulin Yes- + 1 No- 0   Pre-operative creatinine > 2mg/dl Yes- + 1 No- 0   Total: 0      Risk Status:  Estimated risk of cardiac complications after non-cardiac surgery using the Revised Cardiac Risk Index for Preoperative risk is 3.9 %      ARISCAT (Canet) risk index: Low: 1.6% risk of post-op pulmonary complications; Intermediate: 13.3% risk of post-op pulmonary complications if surgery is > 3 hours.     American Society of Anesthesiologists Physical Status classification (ASA): 2                   Orders Placed This Encounter    Echo           Assessment/Plan:     Primary osteoarthritis of right knee  Scheduled with Dr. Cardenas 10/7/24 for right knee arthroplasty.     Hypercholesterolemia  Recommend weight loss, healthy diet (DASH/Mediterranean) and exercise.   Patient should exercise 30 minutes at least five times weekly once recovered from surgery.   Not currently treated    Component      Latest Ref Rng 7/29/2024   Cholesterol Total      120 - 199 mg/dL 187    Triglycerides      30 - 150 mg/dL 51    HDL      40 - 75 mg/dL 57    LDL Cholesterol      63.0 - 159.0 mg/dL 119.8    HDL/Cholesterol Ratio      20.0 - 50.0 % 30.5    Total Cholesterol/HDL Ratio      2.0 - 5.0  3.3    Non-HDL Cholesterol      mg/dL 130          Chronic kidney disease, stage 3a  No changes in urine volume.     Most recent GFR:55.4    BUN=  14  Cr = 1.1  Encouraged to avoid NSAIDs, reduce salt intake, maintain adequate hydration, and exercise.      Tylenol as needed for pain    I  suggest monitoring renal function, input and output status jerrica-operatively. I  suggest avoiding nephrotoxic medication including NSAIDs, COX2 inhibitors, intravenous contrast agent,avoiding hypotension to prevent further renal impairment.      Anemia, sickle trait, documented since 2005  Current labs:  Hgb-  12.2    Hct-    37.0; normal.        Vitamin D deficiency  Taking 2000 units daily; no recent level.    Body mass index (BMI) of 35.0 to 35.9  with comorbidity  Patient would benefit from weight loss and has set goals to achieve success.   Lifestyle changes should be made by eating healthy, exercising at least 150 minutes weekly, and avoiding sedentary behavior.       Discussion/Management of Perioperative Care    Thromboembolic prophylaxis (VTE) Care: Risk factors for thrombosis include: age, obesity, and surgical procedure.  I recommend prophylaxis of thromboembolism with the use of compression stockings/pneumatic devices, and/or pharmacologic agents. The benefits should outweigh the risks for pharmacologic prophylaxis in the perioperative period. I also encourage early ambulation if not contraindicated during the post-operative period.    Risk factors for post-operative pulmonary complications include:age > 65 years. To reduce the risk of pulmonary complications, prophylactic recommendations include: early ambulation and pain control.      Risk factors for renal complications include: age. To reduce the risk of postoperative renal complications, I recommend the patient maintain adequate hydration.  Avoid/reduce NSAIDS and SALINAS-2 inhibitors use as well as IV contrast for renal protection.    I recommend the use of appropriate prophylactic antibiotics to reduce the risk of surgical site infections.    The patient is at an increased risk for urinary retention due to : possible regional anesthesia and advanced age. I recommend to avoid/decrease the use of benzodiazepines, anticholinergics, and Benadryl in the perioperative period. I also recommend using opioids for the shortest period of time if possible.          This visit was focused on Preoperative evaluation, Perioperative Medical management, complication reduction plans. I suggest that the patient follows up with primary care or relevant sub specialists for ongoing health care.    I appreciate the opportunity to be involved in this patients care. Please feel free to contact me if there were any  questions about this consultation.      I spent a total of 52 minutes on the day of the visit.  This includes face to face time and non-face to face time preparing to see the patient (e.g., review of tests), obtaining and/or reviewing separately obtained history, documenting clinical information in the electronic or other health record, independently interpreting results and communicating results to the patient/family/caregiver, or care coordinator.        Patient is pending optimizationINR/Echo for murmur      Dede Colón NP  Perioperative Medicine  [facility]

## 2024-09-19 NOTE — ASSESSMENT & PLAN NOTE
Recommend weight loss, healthy diet (DASH/Mediterranean) and exercise.   Patient should exercise 30 minutes at least five times weekly once recovered from surgery.   Not currently treated    Component      Latest Ref Rng 7/29/2024   Cholesterol Total      120 - 199 mg/dL 187    Triglycerides      30 - 150 mg/dL 51    HDL      40 - 75 mg/dL 57    LDL Cholesterol      63.0 - 159.0 mg/dL 119.8    HDL/Cholesterol Ratio      20.0 - 50.0 % 30.5    Total Cholesterol/HDL Ratio      2.0 - 5.0  3.3    Non-HDL Cholesterol      mg/dL 130

## 2024-09-20 ENCOUNTER — OFFICE VISIT (OUTPATIENT)
Dept: ORTHOPEDICS | Facility: CLINIC | Age: 64
End: 2024-09-20
Payer: COMMERCIAL

## 2024-09-20 ENCOUNTER — TELEPHONE (OUTPATIENT)
Dept: ORTHOPEDICS | Facility: CLINIC | Age: 64
End: 2024-09-20

## 2024-09-20 ENCOUNTER — HOSPITAL ENCOUNTER (OUTPATIENT)
Dept: RADIOLOGY | Facility: HOSPITAL | Age: 64
Discharge: HOME | End: 2024-09-20
Attending: ORTHOPAEDIC SURGERY
Payer: COMMERCIAL

## 2024-09-20 ENCOUNTER — OFFICE VISIT (OUTPATIENT)
Dept: INTERNAL MEDICINE | Facility: CLINIC | Age: 64
End: 2024-09-20
Payer: COMMERCIAL

## 2024-09-20 VITALS
HEART RATE: 77 BPM | OXYGEN SATURATION: 98 % | SYSTOLIC BLOOD PRESSURE: 133 MMHG | BODY MASS INDEX: 35.26 KG/M2 | HEIGHT: 66 IN | TEMPERATURE: 98 F | WEIGHT: 219.38 LBS | DIASTOLIC BLOOD PRESSURE: 75 MMHG

## 2024-09-20 VITALS — BODY MASS INDEX: 35.35 KG/M2 | HEIGHT: 66 IN | WEIGHT: 219.94 LBS

## 2024-09-20 VITALS — HEIGHT: 66 IN | BODY MASS INDEX: 35.35 KG/M2 | WEIGHT: 219.94 LBS

## 2024-09-20 DIAGNOSIS — Z96.651 S/P TOTAL KNEE REPLACEMENT, RIGHT: Primary | ICD-10-CM

## 2024-09-20 DIAGNOSIS — E78.00 HYPERCHOLESTEROLEMIA: ICD-10-CM

## 2024-09-20 DIAGNOSIS — E55.9 VITAMIN D DEFICIENCY: ICD-10-CM

## 2024-09-20 DIAGNOSIS — R01.1 MURMUR, CARDIAC: ICD-10-CM

## 2024-09-20 DIAGNOSIS — Z01.818 PREOPERATIVE EXAMINATION: Primary | ICD-10-CM

## 2024-09-20 DIAGNOSIS — M17.11 PRIMARY OSTEOARTHRITIS OF RIGHT KNEE: ICD-10-CM

## 2024-09-20 DIAGNOSIS — M17.11 PRIMARY OSTEOARTHRITIS OF RIGHT KNEE: Primary | ICD-10-CM

## 2024-09-20 DIAGNOSIS — D64.9 ANEMIA, UNSPECIFIED TYPE: ICD-10-CM

## 2024-09-20 DIAGNOSIS — N18.31 CHRONIC KIDNEY DISEASE, STAGE 3A: ICD-10-CM

## 2024-09-20 PROCEDURE — 99999 PR PBB SHADOW E&M-EST. PATIENT-LVL III: CPT | Mod: PBBFAC,,, | Performed by: ORTHOPAEDIC SURGERY

## 2024-09-20 PROCEDURE — 99999 PR PBB SHADOW E&M-EST. PATIENT-LVL III: CPT | Mod: PBBFAC,,, | Performed by: NURSE PRACTITIONER

## 2024-09-20 PROCEDURE — 73560 X-RAY EXAM OF KNEE 1 OR 2: CPT | Mod: 26,RT,, | Performed by: RADIOLOGY

## 2024-09-20 PROCEDURE — 73560 X-RAY EXAM OF KNEE 1 OR 2: CPT | Mod: TC,RT

## 2024-09-20 RX ORDER — BISACODYL 10 MG/1
10 SUPPOSITORY RECTAL EVERY 12 HOURS PRN
OUTPATIENT
Start: 2024-09-20

## 2024-09-20 RX ORDER — NALOXONE HCL 0.4 MG/ML
0.02 VIAL (ML) INJECTION
OUTPATIENT
Start: 2024-09-20

## 2024-09-20 RX ORDER — CEFAZOLIN SODIUM 2 G/50ML
2 SOLUTION INTRAVENOUS
OUTPATIENT
Start: 2024-09-20

## 2024-09-20 RX ORDER — PREGABALIN 75 MG/1
75 CAPSULE ORAL
OUTPATIENT
Start: 2024-09-20

## 2024-09-20 RX ORDER — AMOXICILLIN 250 MG
1 CAPSULE ORAL 2 TIMES DAILY
OUTPATIENT
Start: 2024-09-20

## 2024-09-20 RX ORDER — OXYCODONE HYDROCHLORIDE 5 MG/1
10 TABLET ORAL
OUTPATIENT
Start: 2024-09-20

## 2024-09-20 RX ORDER — ACETAMINOPHEN 500 MG
1000 TABLET ORAL EVERY 6 HOURS
OUTPATIENT
Start: 2024-09-20

## 2024-09-20 RX ORDER — PREGABALIN 75 MG/1
75 CAPSULE ORAL NIGHTLY
OUTPATIENT
Start: 2024-09-20

## 2024-09-20 RX ORDER — MIDAZOLAM HYDROCHLORIDE 1 MG/ML
4 INJECTION, SOLUTION INTRAMUSCULAR; INTRAVENOUS
OUTPATIENT
Start: 2024-09-20 | End: 2024-09-21

## 2024-09-20 RX ORDER — FAMOTIDINE 20 MG/1
20 TABLET, FILM COATED ORAL 2 TIMES DAILY
OUTPATIENT
Start: 2024-09-20

## 2024-09-20 RX ORDER — ONDANSETRON HYDROCHLORIDE 2 MG/ML
4 INJECTION, SOLUTION INTRAVENOUS EVERY 8 HOURS PRN
OUTPATIENT
Start: 2024-09-20

## 2024-09-20 RX ORDER — FENTANYL CITRATE 50 UG/ML
25 INJECTION, SOLUTION INTRAMUSCULAR; INTRAVENOUS EVERY 5 MIN PRN
OUTPATIENT
Start: 2024-09-20

## 2024-09-20 RX ORDER — CEFAZOLIN SODIUM 2 G/50ML
2 SOLUTION INTRAVENOUS
OUTPATIENT
Start: 2024-09-20 | End: 2024-09-21

## 2024-09-20 RX ORDER — PROCHLORPERAZINE EDISYLATE 5 MG/ML
5 INJECTION INTRAMUSCULAR; INTRAVENOUS EVERY 6 HOURS PRN
OUTPATIENT
Start: 2024-09-20

## 2024-09-20 RX ORDER — LIDOCAINE HYDROCHLORIDE 10 MG/ML
1 INJECTION, SOLUTION EPIDURAL; INFILTRATION; INTRACAUDAL; PERINEURAL
OUTPATIENT
Start: 2024-09-20

## 2024-09-20 RX ORDER — SODIUM CHLORIDE 0.9 % (FLUSH) 0.9 %
10 SYRINGE (ML) INJECTION
OUTPATIENT
Start: 2024-09-20

## 2024-09-20 RX ORDER — METHOCARBAMOL 750 MG/1
750 TABLET, FILM COATED ORAL 3 TIMES DAILY
OUTPATIENT
Start: 2024-09-20

## 2024-09-20 RX ORDER — SODIUM CHLORIDE 9 MG/ML
INJECTION, SOLUTION INTRAVENOUS
OUTPATIENT
Start: 2024-09-20

## 2024-09-20 RX ORDER — MUPIROCIN 20 MG/G
1 OINTMENT TOPICAL
OUTPATIENT
Start: 2024-09-20

## 2024-09-20 RX ORDER — SODIUM CHLORIDE 9 MG/ML
INJECTION, SOLUTION INTRAVENOUS CONTINUOUS
OUTPATIENT
Start: 2024-09-20 | End: 2024-09-21

## 2024-09-20 RX ORDER — MORPHINE SULFATE 2 MG/ML
2 INJECTION, SOLUTION INTRAMUSCULAR; INTRAVENOUS
OUTPATIENT
Start: 2024-09-20

## 2024-09-20 RX ORDER — POLYETHYLENE GLYCOL 3350 17 G/17G
17 POWDER, FOR SOLUTION ORAL DAILY
OUTPATIENT
Start: 2024-09-20

## 2024-09-20 RX ORDER — ASPIRIN 81 MG/1
81 TABLET ORAL 2 TIMES DAILY
OUTPATIENT
Start: 2024-09-20

## 2024-09-20 RX ORDER — TALC
6 POWDER (GRAM) TOPICAL NIGHTLY PRN
OUTPATIENT
Start: 2024-09-20

## 2024-09-20 RX ORDER — ACETAMINOPHEN 500 MG
1000 TABLET ORAL
OUTPATIENT
Start: 2024-09-20

## 2024-09-20 RX ORDER — OXYCODONE HYDROCHLORIDE 5 MG/1
5 TABLET ORAL
OUTPATIENT
Start: 2024-09-20

## 2024-09-20 RX ORDER — MUPIROCIN 20 MG/G
1 OINTMENT TOPICAL 2 TIMES DAILY
OUTPATIENT
Start: 2024-09-20 | End: 2024-09-25

## 2024-09-20 RX ORDER — FENTANYL CITRATE 50 UG/ML
100 INJECTION, SOLUTION INTRAMUSCULAR; INTRAVENOUS
OUTPATIENT
Start: 2024-09-20 | End: 2024-09-21

## 2024-09-20 NOTE — TELEPHONE ENCOUNTER
"post op help is questionable: told patient she needs to confirm her niece can help and let us know by next week: Sos support: Lives alone, no children, no other family in the city Says she has shared her surgery date with niece but "she hasn't said she's not coming so I'm assuming that she's going to be there"   "

## 2024-09-20 NOTE — H&P
CC:  Right knee pain    Albertina Sim is a 64 y.o. female with history of Right knee pain. Pain is worse with activity and weight bearing.  Patient has experienced interference of activities of daily living due to decreased range of motion and an increase in joint pain and swelling.  Patient has failed non-operative treatment including NSAIDs, corticosteroid injections, viscosupplement injections, and activity modification.  Albertina Sim currently ambulates independently.     Relevant medical conditions of significance in perioperative period:  CKD stage 3- monitored by pcp  Obesity- on wegovy managed by pcp    Past Medical History:   Diagnosis Date    Arthritis     Disorder of kidney and ureter     Hyperlipidemia     Sickle cell trait     Vitamin D deficiency        Past Surgical History:   Procedure Laterality Date    COLONOSCOPY N/A 2/14/2023    Procedure: COLONOSCOPY;  Surgeon: Alessandro Cedeño MD;  Location: [facility] ([address] [place] [address]);  Service: Endoscopy;  Laterality: N/A;  instr emailed-ml  2/7/23 pre call complete - [email or web address]       Family History   Problem Relation Name Age of Onset    Atrial fibrillation Mother Rosemarie Sim         dementia, 91    Hypertension Mother Rosemarie Sim     Arthritis Mother Rosemarie Sim     Heart disease Mother Rosemarie Sim     Cancer Father Tushar Sim         lung, 90    Glaucoma Father Tushar Sim     Stroke Brother      Cancer Sister Perri     Cancer Brother Garo     Stroke Brother Vince Giles        Review of patient's allergies indicates:   Allergen Reactions    No known drug allergies          Current Outpatient Medications:     cholecalciferol, vitamin D3, (VITAMIN D3) 2,000 unit Cap, Take 1 capsule (2,000 Units total) by mouth once daily., Disp: , Rfl:     multivitamin (THERAGRAN) per tablet, Take 1 tablet by mouth once daily., Disp: , Rfl:     WEGOVY 0.5 mg/0.5 mL PnIj, Inject 0.5 mg into the skin once a week., Disp: , Rfl:     Review of Systems:  Constitutional: no fever  "or chills  Eyes: no visual changes  ENT: no nasal congestion or sore throat  Respiratory: no cough or shortness of breath  Cardiovascular: no chest pain or palpitations  Gastrointestinal: no nausea or vomiting, tolerating diet  Genitourinary: no hematuria or dysuria  Integument/Breast: no rash or pruritis  Hematologic/Lymphatic: no easy bruising or lymphadenopathy  Musculoskeletal: positive for knee pain  Neurological: no seizures or tremors  Behavioral/Psych: no auditory or visual hallucinations  Endocrine: no heat or cold intolerance    PE:  Ht 5' 6" (1.676 m)   Wt 99.8 kg (219 lb 14.9 oz)   LMP 01/01/2006 (Approximate) Comment: 2006  BMI 35.50 kg/m²   General: Pleasant, cooperative, NAD   Gait: antalgic  HEENT: NCAT, sclera nonicteric   Lungs: Respirations clear bilaterally; equal and unlabored.   CV: S1S2; 2+ bilateral upper and lower extremity pulses.   Skin: Intact throughout with no rashes, erythema, or lesions  Extremities: No LE edema,  no erythema or warmth of the skin in either lower extremity.    Right knee exam:  Knee Range of Motion:normal, pain with passive range of motion  Effusion:none  Condition of skin:intact  Location of tenderness:Medial joint line   Strength:normal  Stability: stable to testing    Hip Examination: painless PROM of hip     Radiographs: Radiographs reveal advanced degenerative changes including subchondral cyst formation, subchondral sclerosis, osteophyte formation, joint space narrowing.     Knee Alignment: normal    Diagnosis: Primary osteoarthritis Right knee    Plan: Right total knee arthroplasty    Due to the serious nature of total joint infection and the high prevalence of community acquired MRSA, vancomycin will be used perioperatively.            "

## 2024-09-20 NOTE — DISCHARGE INSTRUCTIONS
Your surgery has been scheduled for:______10/7/24____________________________________    You should report to:  ____[facility], located on the [place] side of the first floor of the           [facility] ([phone])  ____[facility], located on the [facility] side of the            Second floor of the [facility] ([phone])  ____3rd Floor [facility] located on the [facility] side of the [facility] [phone]  _X___[facility]: located at [address] [facility] [place] [address]. Building [address].     Please Note   Tell your doctor if you take Aspirin, products containing Aspirin, herbal medications  or blood thinners, such as Coumadin, Ticlid, or Plavix.  (Consult your provider regarding holding or stopping before surgery).  Arrange for someone to drive you home following surgery.  You will not be allowed to leave the surgical facility alone or drive yourself home following sedation and anesthesia.    Before Surgery  Stop taking all herbal medications, vitamins, and supplements 7 days prior to surgery  No Motrin/Advil (Ibuprofen) 7 days before surgery  No Aleve (Naproxen) 7 days before surgery   No Goody's/BC Powder 7 days before surgery  Refrain from drinking alcoholic beverages for 24 hours before and after surgery  Stop or limit smoking at least 24 hours prior to surgery  You may take Tylenol for pain    Night before Surgery  Do not eat or drink after midnight  Take a shower or bath (shower is recommended).  Bathe with Hibiclens soap or an antibacterial soap from the neck down.  If not supplied by your surgeon, hibiclens soap will need to be purchased over the counter in pharmacy.  Rinse soap off thoroughly.  Shampoo your hair with your regular shampoo    The Day of Surgery  Take another bath or shower with hibiclens or any antibacterial soap, to reduce the chance of infection.  Take heart  and blood pressure medications with a small sip of water, as advised by the perioperative team.  Do not take fluid pills  You may brush your teeth and rinse your mouth, but do not swallow any additional water.   Do not apply perfumes, powder, body lotions or deodorant on the day of surgery.  Nail polish should be removed.  Do not wear makeup or moisturizer  Wear comfortable clothes, such as a button front shirt and loose fitting pants.  Leave all jewelry, including body piercings, and valuables at home.    Bring any devices you will need after surgery such as crutches or canes.  If you have sleep apnea, please bring your CPAP machine  In the event that your physical condition changes including the onset of a cold or respiratory illness, or if you have to delay or cancel your surgery, please notify your surgeon.

## 2024-09-20 NOTE — H&P (VIEW-ONLY)
CC:  Right knee pain    Albertina Sim is a 64 y.o. female with history of Right knee pain. Pain is worse with activity and weight bearing.  Patient has experienced interference of activities of daily living due to decreased range of motion and an increase in joint pain and swelling.  Patient has failed non-operative treatment including NSAIDs, corticosteroid injections, viscosupplement injections, and activity modification.  Albertina Sim currently ambulates independently.     Relevant medical conditions of significance in perioperative period:  CKD stage 3- monitored by pcp  Obesity- on wegovy managed by pcp    Past Medical History:   Diagnosis Date    Arthritis     Disorder of kidney and ureter     Hyperlipidemia     Sickle cell trait     Vitamin D deficiency        Past Surgical History:   Procedure Laterality Date    COLONOSCOPY N/A 2/14/2023    Procedure: COLONOSCOPY;  Surgeon: Alessandro Cedeño MD;  Location: [facility] ([address] [place] [address]);  Service: Endoscopy;  Laterality: N/A;  instr emailed-ml  2/7/23 pre call complete - [email or web address]       Family History   Problem Relation Name Age of Onset    Atrial fibrillation Mother Rosemarie Sim         dementia, 91    Hypertension Mother Rosemarie Sim     Arthritis Mother Rosemarie Sim     Heart disease Mother Rosemarie Sim     Cancer Father Tushar Sim         lung, 90    Glaucoma Father Tushar Sim     Stroke Brother      Cancer Sister Perri     Cancer Brother Garo     Stroke Brother Vince Giles        Review of patient's allergies indicates:   Allergen Reactions    No known drug allergies          Current Outpatient Medications:     cholecalciferol, vitamin D3, (VITAMIN D3) 2,000 unit Cap, Take 1 capsule (2,000 Units total) by mouth once daily., Disp: , Rfl:     multivitamin (THERAGRAN) per tablet, Take 1 tablet by mouth once daily., Disp: , Rfl:     WEGOVY 0.5 mg/0.5 mL PnIj, Inject 0.5 mg into the skin once a week., Disp: , Rfl:     Review of Systems:  Constitutional: no fever  "or chills  Eyes: no visual changes  ENT: no nasal congestion or sore throat  Respiratory: no cough or shortness of breath  Cardiovascular: no chest pain or palpitations  Gastrointestinal: no nausea or vomiting, tolerating diet  Genitourinary: no hematuria or dysuria  Integument/Breast: no rash or pruritis  Hematologic/Lymphatic: no easy bruising or lymphadenopathy  Musculoskeletal: positive for knee pain  Neurological: no seizures or tremors  Behavioral/Psych: no auditory or visual hallucinations  Endocrine: no heat or cold intolerance    PE:  Ht 5' 6" (1.676 m)   Wt 99.8 kg (219 lb 14.9 oz)   LMP 01/01/2006 (Approximate) Comment: 2006  BMI 35.50 kg/m²   General: Pleasant, cooperative, NAD   Gait: antalgic  HEENT: NCAT, sclera nonicteric   Lungs: Respirations clear bilaterally; equal and unlabored.   CV: S1S2; 2+ bilateral upper and lower extremity pulses.   Skin: Intact throughout with no rashes, erythema, or lesions  Extremities: No LE edema,  no erythema or warmth of the skin in either lower extremity.    Right knee exam:  Knee Range of Motion:normal, pain with passive range of motion  Effusion:none  Condition of skin:intact  Location of tenderness:Medial joint line   Strength:normal  Stability: stable to testing    Hip Examination: painless PROM of hip     Radiographs: Radiographs reveal advanced degenerative changes including subchondral cyst formation, subchondral sclerosis, osteophyte formation, joint space narrowing.     Knee Alignment: normal    Diagnosis: Primary osteoarthritis Right knee    Plan: Right total knee arthroplasty    Due to the serious nature of total joint infection and the high prevalence of community acquired MRSA, vancomycin will be used perioperatively.            "

## 2024-09-20 NOTE — PROGRESS NOTES
"  Pre-op R VTKA 10/7/24    No interval changes  Progressive knee pain     [facility]: echo ordered for murmur 9/23/24    Exam unchanged    Sos support:   Lives alone, no children,  no other family in the city  Says she has shared her surgery date with niece but "she hasn't said she's not coming so I'm assuming that she's going to be there"       This patient has significant symptoms in their knee that are affecting their quality of life and daily activities.  They have tried non-operative treatment including analgesics, an exercise program, and activity modification, but the symptoms have persisted. I believe they make a good candidate for knee arthroplasty.     The risks and benefits of knee arthroplasty have been discussed with the patient which include, but are not limited to infections (including severe sequelae), potential component failure, fracture, DVT, pulmonary embolus, nerve palsy, poor range of motion, the possibility of bone grafting, persistent pain, wound healing complications, transfusions, medical complications and death.     We discussed surgical options including implant type and why I believe the implant selected is a good match for the patient's needs. The patient expressed understanding and agrees to proceed with the planned surgery.     Pre-operative planning will include the following:  A pre-surgical evaluation by will be arranged.  Pre-op orders will be placed.  We will make arrangements with the operating room for proper time and staffing.  We will make Social Service arrangements for the patient.    Implants:   Company: [employer]  System: Attune  Velys all poly      DVT prophylaxis: ASA 81mg BID x1 month    Dispo:   PT     Admission status: Outpatient/23hr OBS    Location: [facility]   1 night, limited social support, needs to coordinate with niece   Fall - after October, wedding end of September in [place], job busy with school starting    Dispo: She will confirm niece can help and let us " know next week    Stiff knees make stiff knee replacements  She rides her bicycle to work 3 days/week

## 2024-09-20 NOTE — ASSESSMENT & PLAN NOTE
Patient would benefit from weight loss and has set goals to achieve success.   Lifestyle changes should be made by eating healthy, exercising at least 150 minutes weekly, and avoiding sedentary behavior.

## 2024-09-20 NOTE — PROGRESS NOTES
[facility]    HOME HEALTH ORDERS  FACE TO FACE ENCOUNTER    Patient Name: Albertina Sim  YOB: 1960    PCP: Angelia Roldan MD   PCP Address: [address] / [place] [address]  PCP Phone Number: [phone]  PCP Fax: [phone]    Encounter Date: 09/20/2024    Admit to Home Health    Diagnoses:  There are no hospital problems to display for this patient.      Future Appointments   Date Time Provider Department Center   9/23/2024  7:15 AM ECHO, [facility]   9/30/2024  8:00 AM Parminder Pineda, PT [facility] [place]   10/9/2024 11:00 AM TELEMED NURSE, [facility] [place]   10/22/2024  8:00 AM Parminder Pineda, PT [facility] [place]   10/22/2024  9:40 AM Yolanda Nichols NP [facility] [place]   11/19/2024  2:40 PM Yolanda Nichols NP [facility] [place]   12/31/2024 10:00 AM Ramses Cardenas III, MD [facility]           I have seen and examined this patient face to face today. My clinical findings that support the need for the home health skilled services and home bound status are the following:  Weakness/numbness causing balance and gait disturbance due to Joint Replacement making it taxing to leave home.  Requiring assistive device to leave home due to unsteady gait caused by Joint Replacement.  Medical restrictions requiring assistance of another human to leave home due to  Unstable ambulation and Decreased range of motions in extremities.    Allergies:  Review of patient's allergies indicates:   Allergen Reactions    No known drug allergies        Diet: regular diet    Activities: activity as tolerated    Home Health Admitting Clinician:   SN/PT to complete comprehensive assessment including routine vital signs. Instruct on disease process and s/s of complications to report to MD. Follow specific home health arthoplasty protocol. Review/verify medication list sent home with the patient at time  of discharge  and instruct patient/caregiver as needed. If coumadin ordered, coumadin clinic to manage INR with INR draws 2x per week with a goal to maintain INR between 1.8 and 2.2. Frequency may be adjusted depending on start of care date.    Notify MD if SBP > 160 or < 90; DBP > 90 or < 50; HR > 120 or < 50; Temp > 101    Home Medical Equipment:  Walker, 3-1 bedside commode, transfer tub bench    CONSULTS:    Physical Therapy may admit if patient not on coumadin, PT to perform comprehensive assessment if performing admit visit and generate therapy plan of care. Evaluate for home safety and equipment needs; Establish/upgrade home exercise program. Perform/instruct on therapeutic exercises, gait training, transfer training, and Range of Motion.      OTHER: (only select if patient needs other therapy disciplines)  Occupational Therapy to evaluate and treat. Evaluate home environment for safety and equipment needs. Perform/Instruct on transfers, ADL training, ROM, and therapeutic exercises.    MISCELLANEOUS CARE:  Home health approved by Dr. Cardenas in the post op period.    WOUND CARE ORDERS:  Assess Surgical Incision/DSRG each [place]  Aquacel AG drsg applied post-op leave on 14 days post op. Call MD if any drainage reaches border to border of drsg horizontally, s/s of infection, temp >101, induration, swelling or redness.  If dressing is removed per MD order, then apply island dressing, change/teach caregiver to perform daily dressing change if island dressing present.    Medications: Review discharge medications with patient and family and provide education.      Cannot display discharge medications since this is not an admission.      I certify that this patient is confined to her home and needs physical therapy and occupational therapy.

## 2024-09-20 NOTE — PROGRESS NOTES
Albertina Sim is a 64 y.o. year old here today for pre surgery optimization visit  in preparation for a Right total knee arthroplasty to be performed by Dr. Cardenas  on 10/7/24.  she was last seen and treated in the clinic on 9/20/2024. she will be medically optimized by the pre op center. There has been no significant change in medical status since last visit. No fever, chills, malaise, or unexplained weight change.      Allergies, Medications, past medical and surgical history reviewed.    Focused examination performed.    Patient saw surgeon in clinic today. All questions answered. Patient encouraged to call with questions. Contact information given.     Pre, jerrica, and post operative procedures and expectations discussed. Goals of successful surgery reviewed and include manageable pain levels, surgical site free of infection, medication management, and ambulation with PT/OT assistance. Healthy weight management discussed with patient and caregiver who were receptive to eduction of healthy diet and activity. No other necessary lifestyle changes identified. Educated patient about signs and symptoms of infection, medication management, anticoagulation therapy, risk of tobacco and alcohol use, and self-care to promote healing. Surgical guide given for future reference. Hibiclens given to patient with instructions. All questions were answered.     Albertina Sim verbalized an understanding to the education and goals. Patient has displayed readiness to engage in care and is ready to proceed with surgery.  Patient reports her niece is able and ready to provide assistance at home after discharge. She reports that her niece lives across the street and her brother in law lives next door.    Surgical and blood consents signed.    DME will be arranged. The mobility limitation cannot be sufficiently resolved by the use of a cane. Patient's functional mobility deficit can be sufficiently resolved with the use of a (Rolling Walker  "or Walker). Patient's mobility limitation significantly impairs their ability to participate in one of more activities of daily living. The use of a (Rolling Walker or Walker) will significantly improve the patient's ability to participate in MRADLS and the patient will use it on regular basis in the home."     Albertina Sim will contact us if there are any questions, concerns, or changes in medical status prior to surgery.       Joint class: 9/16    Patient has discussed discharge planning with surgeon. Patient will be discharged to home following surgery.   patient will be scheduled with Home Health during hospitalization.     30 minutes of time was spent on patient education, review of records, templating, H&P, , appointment scheduling and optimizing patient for surgery.      "

## 2024-09-23 ENCOUNTER — HOSPITAL ENCOUNTER (OUTPATIENT)
Dept: CARDIOLOGY | Facility: HOSPITAL | Age: 64
Discharge: HOME | End: 2024-09-23
Attending: NURSE PRACTITIONER
Payer: COMMERCIAL

## 2024-09-23 VITALS
SYSTOLIC BLOOD PRESSURE: 140 MMHG | DIASTOLIC BLOOD PRESSURE: 87 MMHG | WEIGHT: 219 LBS | BODY MASS INDEX: 35.2 KG/M2 | HEART RATE: 76 BPM | HEIGHT: 66 IN

## 2024-09-23 DIAGNOSIS — R01.1 MURMUR, CARDIAC: ICD-10-CM

## 2024-09-23 LAB
ASCENDING AORTA: 3.39 CM
AV AREA BY CONTINUOUS VTI: 2.4 CM2
AV INDEX (PROSTH): 0.64
AV LVOT MEAN GRADIENT: 2 MMHG
AV LVOT PEAK GRADIENT: 3 MMHG
AV MEAN GRADIENT: 5 MMHG
AV PEAK GRADIENT: 9 MMHG
AV VALVE AREA BY VELOCITY RATIO: 2.29 CM²
AV VALVE AREA: 2.4 CM2
AV VELOCITY RATIO: 0.61
BSA FOR ECHO PROCEDURE: 2.15 M2
CV ECHO LV RWT: 0.31 CM
DOP CALC AO PEAK VEL: 1.5 M/S
DOP CALC AO VTI: 32.45 CM
DOP CALC LVOT AREA: 3.7 CM2
DOP CALC LVOT DIAMETER: 2.18 CM
DOP CALC LVOT PEAK VEL: 0.92 M/S
DOP CALC LVOT STROKE VOLUME: 78.01 CM3
DOP CALCLVOT PEAK VEL VTI: 20.91 CM
E WAVE DECELERATION TIME: 185.71 MS
E/A RATIO: 0.9
E/E' RATIO: 8 M/S
ECHO EF ESTIMATED: 59 %
ECHO LV POSTERIOR WALL: 0.75 CM (ref 0.6–1.1)
EJECTION FRACTION: 55 %
FRACTIONAL SHORTENING: 31 % (ref 28–44)
INTERVENTRICULAR SEPTUM: 1 CM (ref 0.6–1.1)
LA MAJOR: 5.22 CM
LA MINOR: 4.99 CM
LA WIDTH: 3.42 CM
LEFT ATRIUM SIZE: 3.49 CM
LEFT ATRIUM VOLUME INDEX: 24.9 ML/M2
LEFT ATRIUM VOLUME MOD: 77.83 ML
LEFT ATRIUM VOLUME: 51.77 CM3
LEFT INTERNAL DIMENSION IN SYSTOLE: 3.31 CM (ref 2.1–4)
LEFT VENTRICLE DIASTOLIC VOLUME INDEX: 52.25 ML/M2
LEFT VENTRICLE DIASTOLIC VOLUME: 108.68 ML
LEFT VENTRICLE MASS INDEX: 69 G/M2
LEFT VENTRICLE SYSTOLIC VOLUME INDEX: 21.3 ML/M2
LEFT VENTRICLE SYSTOLIC VOLUME: 44.4 ML
LEFT VENTRICULAR INTERNAL DIMENSION IN DIASTOLE: 4.82 CM (ref 3.5–6)
LEFT VENTRICULAR MASS: 143.38 G
LV LATERAL E/E' RATIO: 9 M/S
LV SEPTAL E/E' RATIO: 7.2 M/S
MV A" WAVE DURATION": 91.34 MS
MV PEAK A VEL: 0.8 M/S
MV PEAK E VEL: 0.72 M/S
PISA TR MAX VEL: 3 M/S
PULM VEIN A" WAVE DURATION": 91.34 MS
PULM VEIN S/D RATIO: 1.16
PULMONIC VEIN PEAK A VELOCITY: 0.4 M/S
PV PEAK D VEL: 0.55 M/S
PV PEAK S VEL: 0.64 M/S
RA MAJOR: 4.77 CM
RA PRESSURE ESTIMATED: 3 MMHG
RA WIDTH: 4.12 CM
RIGHT ATRIAL AREA: 18 CM2
RIGHT VENTRICLE DIASTOLIC BASEL DIMENSION: 4.6 CM
RV TB RVSP: 6 MMHG
RV TISSUE DOPPLER FREE WALL SYSTOLIC VELOCITY 1 (APICAL 4 CHAMBER VIEW): 14.89 CM/S
SINUS: 3.05 CM
STJ: 2.64 CM
TDI LATERAL: 0.08 M/S
TDI SEPTAL: 0.1 M/S
TDI: 0.09 M/S
TR MAX PG: 36 MMHG
TRICUSPID ANNULAR PLANE SYSTOLIC EXCURSION: 2.76 CM
TV PEAK GRADIENT: 36 MMHG
TV REST PULMONARY ARTERY PRESSURE: 39 MMHG
Z-SCORE OF LEFT VENTRICULAR DIMENSION IN END DIASTOLE: -2.79
Z-SCORE OF LEFT VENTRICULAR DIMENSION IN END SYSTOLE: -1.29

## 2024-09-23 PROCEDURE — 93306 TTE W/DOPPLER COMPLETE: CPT | Mod: 26,,, | Performed by: INTERNAL MEDICINE

## 2024-09-23 PROCEDURE — 93306 TTE W/DOPPLER COMPLETE: CPT

## 2024-09-27 ENCOUNTER — PATIENT MESSAGE (OUTPATIENT)
Dept: ORTHOPEDICS | Facility: CLINIC | Age: 64
End: 2024-09-27
Payer: COMMERCIAL

## 2024-09-27 ENCOUNTER — TELEPHONE (OUTPATIENT)
Dept: ORTHOPEDICS | Facility: CLINIC | Age: 64
End: 2024-09-27
Payer: COMMERCIAL

## 2024-09-27 NOTE — TELEPHONE ENCOUNTER
Spoke to pt and informed her that the letter will be waiting at the  for her to .    Sincerely,  Ana Maria Pérez, [facility]   Certified Clinical Medical Assistant to Dr. Ramses mcfadden  Phone: [phone]  Fax: [phone]      ----- Message from Karli Davis sent at 9/27/2024  9:02 AM CDT -----  Regarding: appt access; pt advice  Contact: pt @ [phone]  Pt is advising that she is needing a note to advise that she is will be having a procedure on 10/07 as [facility] is wanting to serve on jury 10/10. Please call to advise further. Pt is advising that note can sent via  pt portal. Please call to advise further. Thank you for all you are doing.

## 2024-09-30 ENCOUNTER — CLINICAL SUPPORT (OUTPATIENT)
Dept: REHABILITATION | Facility: OTHER | Age: 64
End: 2024-09-30
Attending: ORTHOPAEDIC SURGERY
Payer: COMMERCIAL

## 2024-09-30 ENCOUNTER — TELEPHONE (OUTPATIENT)
Dept: ORTHOPEDICS | Facility: CLINIC | Age: 64
End: 2024-09-30
Payer: COMMERCIAL

## 2024-09-30 DIAGNOSIS — M25.661 DECREASED RANGE OF MOTION OF RIGHT KNEE: ICD-10-CM

## 2024-09-30 DIAGNOSIS — G89.29 CHRONIC PAIN OF RIGHT KNEE: Primary | ICD-10-CM

## 2024-09-30 DIAGNOSIS — M25.561 CHRONIC PAIN OF RIGHT KNEE: Primary | ICD-10-CM

## 2024-09-30 PROCEDURE — 97112 NEUROMUSCULAR REEDUCATION: CPT | Mod: PN

## 2024-09-30 PROCEDURE — 97162 PT EVAL MOD COMPLEX 30 MIN: CPT | Mod: PN

## 2024-09-30 NOTE — TELEPHONE ENCOUNTER
"I called the patient today regarding her surgery. The patient said that her brother-in-law is going to come to help her out after surgery. The patient verbalized understanding and has no further questions.           ----- Message from Ramses Cardenas MD sent at 9/30/2024  6:42 AM CDT -----  Regarding: TKA 10/7/24: ?help post op  Please follow up with her this week re: help post op or need to delay surgery if no help    When we first saw her she said her niece would help but at her pre-op visit she couldn't confirm that was going to happen    From my note 9/20/24:   Sos support:   Lives alone, no children,  no other family in the city  Says she has shared her surgery date with niece but "she hasn't said she's not coming so I'm assuming that she's going to be there"     Thank you  "

## 2024-09-30 NOTE — PATIENT INSTRUCTIONS
ANKLE: Pumps        Point toes down, then up. _30_ reps per set, _2_ sets per day.    Copyright © [employer]. All rights reserved.     Quad Sets        Tighten top of left thigh. Hold for _5_ seconds. Repeat _20_ times. Do _2-3__ times a day.      Copyright © [employer]. All rights reserved.     HIP ABDUCTION - SIDELYING        While lying on your side, slowly raise up your top leg towards the kin. Keep your knee straight and maintain your toes pointed forward the entire time. Keep your leg in-line with your body.    The bottom leg can be bent to stabilize your body.    Perform 2 sets of 10 repetitions     Copyright © 2024 [employer]      Heel Prop        Sit with leg propped (using a large towel, books, rollers, etc), relax letting the leg straighten into extension.    *Can also assist by placing your hand just above the knee and gently pressing down toward the floor.    Copyright © 2024 [employer]    PRONE HANG        Allow both legs to extend off the end of a firm surface as picture.  The edge of the table should cross just above the knee.  Relax the thigh and leg muscles to allow gravity to straighten the knee fully.         Copyright © 2024 [employer]

## 2024-09-30 NOTE — PROGRESS NOTES
[facility]  Physical Therapy Initial Evaluation    Name: Albertina Sim  Clinic Number: [id number]    Therapy Diagnosis:   Encounter Diagnoses   Name Primary?    Chronic pain of right knee Yes    Decreased range of motion of right knee      Physician: Ramses Cardenas III, *    Physician Orders: PT Eval and Treat   Medical Diagnosis: M17.11 (ICD-10-CM) - Primary osteoarthritis of right knee   Evaluation Date: 9/30/2024  Authorization Period Expiration: 12/31/2024  Plan of Care Certification Period: 10/7/2024  Visit # / Visits authorized: 1/ 20    Time In: 0806  Time Out: 0850  Total Billable Time separate from evaluation time: 12 minutes    Precautions: Standard    Subjective   Date of onset: at least 10 years  History of current condition - Albertina reports: her right knee has bothered her for years, but not so bad that it hurt every day. Last 6 months has had in creased right knee pain. Rides her bike to work 3 days a week. Exercises at [facility], but has increased pain the past few weeks.       Past Medical History:   Diagnosis Date    Arthritis     Disorder of kidney and ureter     Hyperlipidemia     Sickle cell trait     Vitamin D deficiency      Albertina Sim  has a past surgical history that includes Colonoscopy (N/A, 2/14/2023).    Albertina has a current medication list which includes the following prescription(s): cholecalciferol (vitamin d3), multivitamin, and wegovy.    Review of patient's allergies indicates:   Allergen Reactions    No known drug allergies         Falls: denies falls    Imaging:   X-ray  9/20/2024  FINDINGS:  Severe tricompartment degenerative changes of the knee with prominent osteophyte formation.  No evidence of acute fracture or dislocation.  No joint effusion.      Prior Therapy: yes, injections in her right knee  Social History: single lives alone. Raised house. 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 Being independent."    Objective     Right knee varus    Functional assessment:   - walking:     Timed Up and Go: 10.22 seconds    - sit to stand: Independent     30 Seconds Sit to Stand: 12     AROM  LE MMT  R  L    Hip flexion  5/5  5/5    Hip abduction  4/5  4+/5    Hip extension  4/5  4/5    Hip ER  5/5  5/5    Hip IR  5/5  5/5    Knee extension  5/5  5/5    Knee flexion  5/5  5/5    Ankle dorsiflexion  5/5  5/5    Ankle plantar flexion  5/5  5/5    Ankle inversion  5/5  5/5    Ankle eversion  5/5  5/5        Flexibility testing:  - hamstrings:             bilateral: within normal limits   - gastrocnemius:       bilateral: within normal limits   - piriformis:                bilateral: tight, decreased 50%  - quadriceps:             bilateral: tight, decrease  - hip adductors:         bilateral: within normal limits   - hip flexors:              bilateral: within normal limits   - IT bands:                 right: tight, decreased 50%, left: within normal limits     Joint mobility:  Right knee active range of motion: 8 to 97 degrees   Left knee active range of motion: 0 to 125 degrees     Intake Outcome Measure for FOTO Knee Survey    Therapist reviewed FOTO scores for Albertina Sim on 9/30/2024.   FOTO report - see Media section or FOTO account episode details.    Intake Score: 56%         TREATMENT   Treatment Time In: 0835  Treatment Time Out: 0847  Total Treatment time separate from Evaluation time: 12 minutes    Albertina participated in neuromuscular " "re-education activities to improve: muscle activation, sequencing, and strengthening for 12 minutes. The following activities were included:  Heel prop x 3'  Ankle pumps x 30 repetitions  Quadriceps sets 20 x 5"  Side lying hip abduction 2 x 10 repetitions       Home Exercises Provided and Patient Education Provided     Education provided:   - Discussed the role of the PTA on the Rehab Team. Discussed patient will be seen by a physical therapist minimally every 6th visit or every 30 days prior to being seen by PTA. Also discussed the use of the [facility] for communication.    Heel prop   Ankle pumps  Quadriceps sets  Side lying hip abduction  Prone hangs    Written Home Exercises Provided: yes.  Exercises were reviewed and Albertina was able to demonstrate them prior to the end of the session.  Albertina demonstrated good  understanding of the education provided.     See EMR under Patient Instructions for exercises provided 9/30/2024.  Assessment   Albertina is a 64 y.o. female referred to outpatient Physical Therapy with a medical diagnosis of M17.11 (ICD-10-CM) - Primary osteoarthritis of right knee . Patient presents with decreased right knee active range of motion and increased pain in right knee affecting activities of daily living and quality of life. Patient is scheduled for surgery on 10/7 and will have home health physical therapy for 2 weeks prior to outpatient physical therapy. Therapist discussed importance of ankle pumps, quadriceps sets, and establishing full knee extension as quickly as possible following surgery. Discussed scheduling for outpatient physical therapy per protocol. Instructed patient in home exercise program at this time. Patient displayed good strong quadriceps set on the right.     Patient prognosis is Excellent.   Patient will benefit from skilled outpatient Physical Therapy to address the deficits stated above and in the chart below, provide patient/family education, and to maximize " patient's level of independence.     Plan of care discussed with patient: Yes  Patient's spiritual, cultural and educational needs considered and patient is agreeable to the plan of care and goals as stated below:     Anticipated Barriers for therapy: none    Medical Necessity is demonstrated by the following:      Medical Necessity is demonstrated by the following  History  Co-morbidities and personal factors that may impact the plan of care [] LOW: no personal factors / co-morbidities  [] MODERATE: 1-2 personal factors / co-morbidities  [x] HIGH: 3+ personal factors / co-morbidities    Moderate / High Support Documentation:   Co-morbidities affecting plan of care: hyperlipidemia, Sickle Cell anemia, arthritis    Personal Factors:   no deficits     Examination  Body Structures and Functions, activity limitations and participation restrictions that may impact the plan of care [] LOW: addressing 1-2 elements  [] MODERATE: 3+ elements  [x] HIGH: 4+ elements (please support below)    Moderate / High Support Documentation: muscle weakness, decreased knee range of motion, impaired gait, limited exercise and activities of daily living      Clinical Presentation [] LOW: stable  [x] MODERATE: Evolving  [] HIGH: Unstable     Decision Making/ Complexity Score: moderate         Goals:  Short Term Goals: 1 weeks   Independent with home exercise program .      Plan   Certification Period/Plan of care expiration: 9/30/2024 to 10/7/2024.    Outpatient Physical Therapy 1 times weekly for 1 weeks to include the following interventions: Therapeutic Exercise.     Parminder Pineda, PT

## 2024-10-04 ENCOUNTER — TELEPHONE (OUTPATIENT)
Dept: ORTHOPEDICS | Facility: CLINIC | Age: 64
End: 2024-10-04
Payer: COMMERCIAL

## 2024-10-04 ENCOUNTER — PATIENT MESSAGE (OUTPATIENT)
Dept: ORTHOPEDICS | Facility: CLINIC | Age: 64
End: 2024-10-04
Payer: COMMERCIAL

## 2024-10-04 ENCOUNTER — ANESTHESIA EVENT (OUTPATIENT)
Dept: SURGERY | Facility: HOSPITAL | Age: 64
End: 2024-10-04
Payer: COMMERCIAL

## 2024-10-04 NOTE — TELEPHONE ENCOUNTER
I called the patient today regarding surgery on 10/07/2024 with Dr. Ramses Cardenas. I informed the patient that her surgery will be at  [facility] ([address] [place] [address]). I informed the patient they must arrive at 10:30am and their surgery will start at 12:30pm.     Per the [facility] COVID-19 Pre-Procedural Testing Policy (administered 10/26/2022), patients do not need tested for COVID-19 regardless of vaccination status.    I reminded the patient that they cannot eat or drink after midnight, the night before surgery.      I reminded the patient to be careful of their skin over the next few days to make sure they do not get any cuts, scratches or scrapes.    The patient verbalized that they have received their walker, bedside commode and shower chair from [facility].    The patient verbalized understanding and has no further questions.     Sincerely,  Ana Maria Pérez CMA   Certified Clinical Medical Assistant to Dr. Ramses [email or web address]  Phone: [phone]  Fax: [phone]

## 2024-10-07 ENCOUNTER — ANESTHESIA (OUTPATIENT)
Dept: SURGERY | Facility: HOSPITAL | Age: 64
End: 2024-10-07
Payer: COMMERCIAL

## 2024-10-07 ENCOUNTER — HOSPITAL ENCOUNTER (OUTPATIENT)
Facility: HOSPITAL | Age: 64
Discharge: HOME HEALTH | End: 2024-10-08
Attending: ORTHOPAEDIC SURGERY | Admitting: ORTHOPAEDIC SURGERY
Payer: COMMERCIAL

## 2024-10-07 DIAGNOSIS — M17.11 PRIMARY OSTEOARTHRITIS OF RIGHT KNEE: ICD-10-CM

## 2024-10-07 DIAGNOSIS — Z96.651 S/P TOTAL KNEE REPLACEMENT, RIGHT: Primary | ICD-10-CM

## 2024-10-07 PROCEDURE — 63600175 PHARM REV CODE 636 W HCPCS: Performed by: NURSE PRACTITIONER

## 2024-10-07 PROCEDURE — 25000003 PHARM REV CODE 250: Performed by: NURSE PRACTITIONER

## 2024-10-07 PROCEDURE — 71000039 HC RECOVERY, EACH ADD'L HOUR: Performed by: ORTHOPAEDIC SURGERY

## 2024-10-07 PROCEDURE — 63600175 PHARM REV CODE 636 W HCPCS: Performed by: ANESTHESIOLOGY

## 2024-10-07 PROCEDURE — 37000009 HC ANESTHESIA EA ADD 15 MINS: Performed by: ORTHOPAEDIC SURGERY

## 2024-10-07 PROCEDURE — C1776 JOINT DEVICE (IMPLANTABLE): HCPCS | Performed by: ORTHOPAEDIC SURGERY

## 2024-10-07 PROCEDURE — 63600175 PHARM REV CODE 636 W HCPCS: Performed by: NURSE ANESTHETIST, CERTIFIED REGISTERED

## 2024-10-07 PROCEDURE — 25000003 PHARM REV CODE 250: Performed by: SURGERY

## 2024-10-07 PROCEDURE — 94761 N-INVAS EAR/PLS OXIMETRY MLT: CPT

## 2024-10-07 PROCEDURE — D9220A PRA ANESTHESIA: Mod: ANES,,, | Performed by: ANESTHESIOLOGY

## 2024-10-07 PROCEDURE — 71000033 HC RECOVERY, INTIAL HOUR: Performed by: ORTHOPAEDIC SURGERY

## 2024-10-07 PROCEDURE — 25000003 PHARM REV CODE 250: Performed by: NURSE ANESTHETIST, CERTIFIED REGISTERED

## 2024-10-07 PROCEDURE — 25000003 PHARM REV CODE 250: Performed by: ANESTHESIOLOGY

## 2024-10-07 PROCEDURE — 20985 CPTR-ASST DIR MS PX: CPT | Mod: ,,, | Performed by: ORTHOPAEDIC SURGERY

## 2024-10-07 PROCEDURE — C1713 ANCHOR/SCREW BN/BN,TIS/BN: HCPCS | Performed by: ORTHOPAEDIC SURGERY

## 2024-10-07 PROCEDURE — 63600175 PHARM REV CODE 636 W HCPCS: Performed by: ORTHOPAEDIC SURGERY

## 2024-10-07 PROCEDURE — 27201423 OPTIME MED/SURG SUP & DEVICES STERILE SUPPLY: Performed by: ORTHOPAEDIC SURGERY

## 2024-10-07 PROCEDURE — 36000712 HC OR TIME LEV V 1ST 15 MIN: Performed by: ORTHOPAEDIC SURGERY

## 2024-10-07 PROCEDURE — 63600175 PHARM REV CODE 636 W HCPCS: Performed by: SURGERY

## 2024-10-07 PROCEDURE — 36000713 HC OR TIME LEV V EA ADD 15 MIN: Performed by: ORTHOPAEDIC SURGERY

## 2024-10-07 PROCEDURE — 99900035 HC TECH TIME PER 15 MIN (STAT)

## 2024-10-07 PROCEDURE — 25000003 PHARM REV CODE 250: Performed by: ORTHOPAEDIC SURGERY

## 2024-10-07 PROCEDURE — 37000008 HC ANESTHESIA 1ST 15 MINUTES: Performed by: ORTHOPAEDIC SURGERY

## 2024-10-07 PROCEDURE — D9220A PRA ANESTHESIA: Mod: CRNA,,, | Performed by: NURSE ANESTHETIST, CERTIFIED REGISTERED

## 2024-10-07 PROCEDURE — 27447 TOTAL KNEE ARTHROPLASTY: CPT | Mod: 22,RT,, | Performed by: ORTHOPAEDIC SURGERY

## 2024-10-07 DEVICE — ATTUNE PATELLA MEDIALIZED DOME 38MM CEMENTED AOX
Type: IMPLANTABLE DEVICE | Site: KNEE | Status: FUNCTIONAL
Brand: ATTUNE

## 2024-10-07 DEVICE — ATTUNE KNEE SYSTEM ALL POLY TIBIAL IMPLANT POSTERIOR STABILIZED SIZE 6, 6MM AOX
Type: IMPLANTABLE DEVICE | Site: KNEE | Status: FUNCTIONAL
Brand: ATTUNE

## 2024-10-07 DEVICE — FULL DOSE BONE CEMENT, 10 PACK CATALOG NUMBER IS 6191-1-010
Type: IMPLANTABLE DEVICE | Site: KNEE | Status: FUNCTIONAL
Brand: SIMPLEX

## 2024-10-07 DEVICE — ATTUNE KNEE SYSTEM FEMORAL POSTERIOR STABILIZED SIZE 6 RIGHT CEMENTED
Type: IMPLANTABLE DEVICE | Site: KNEE | Status: FUNCTIONAL
Brand: ATTUNE

## 2024-10-07 RX ORDER — OXYCODONE HYDROCHLORIDE 5 MG/1
TABLET ORAL
Qty: 50 TABLET | Refills: 0 | Status: SHIPPED | OUTPATIENT
Start: 2024-10-07

## 2024-10-07 RX ORDER — AMOXICILLIN 250 MG
1 CAPSULE ORAL DAILY
Qty: 30 TABLET | Refills: 0 | Status: SHIPPED | OUTPATIENT
Start: 2024-10-07

## 2024-10-07 RX ORDER — OXYCODONE HYDROCHLORIDE 5 MG/1
5 TABLET ORAL
Status: DISCONTINUED | OUTPATIENT
Start: 2024-10-07 | End: 2024-10-07 | Stop reason: HOSPADM

## 2024-10-07 RX ORDER — FENTANYL CITRATE 50 UG/ML
100 INJECTION, SOLUTION INTRAMUSCULAR; INTRAVENOUS
Status: DISCONTINUED | OUTPATIENT
Start: 2024-10-07 | End: 2024-10-07 | Stop reason: HOSPADM

## 2024-10-07 RX ORDER — LIDOCAINE HYDROCHLORIDE 10 MG/ML
1 INJECTION, SOLUTION EPIDURAL; INFILTRATION; INTRACAUDAL; PERINEURAL
Status: DISCONTINUED | OUTPATIENT
Start: 2024-10-07 | End: 2024-10-07 | Stop reason: HOSPADM

## 2024-10-07 RX ORDER — FAMOTIDINE 20 MG/1
20 TABLET, FILM COATED ORAL 2 TIMES DAILY
Status: DISCONTINUED | OUTPATIENT
Start: 2024-10-07 | End: 2024-10-08 | Stop reason: HOSPADM

## 2024-10-07 RX ORDER — CHOLECALCIFEROL (VITAMIN D3) 25 MCG
2000 TABLET ORAL DAILY
Status: DISCONTINUED | OUTPATIENT
Start: 2024-10-08 | End: 2024-10-08 | Stop reason: HOSPADM

## 2024-10-07 RX ORDER — HALOPERIDOL 5 MG/ML
0.5 INJECTION INTRAMUSCULAR EVERY 10 MIN PRN
Status: DISCONTINUED | OUTPATIENT
Start: 2024-10-07 | End: 2024-10-07 | Stop reason: HOSPADM

## 2024-10-07 RX ORDER — METHOCARBAMOL 750 MG/1
750 TABLET, FILM COATED ORAL 3 TIMES DAILY
Status: DISCONTINUED | OUTPATIENT
Start: 2024-10-07 | End: 2024-10-08 | Stop reason: HOSPADM

## 2024-10-07 RX ORDER — TRANEXAMIC ACID 100 MG/ML
INJECTION, SOLUTION INTRAVENOUS
Status: DISCONTINUED | OUTPATIENT
Start: 2024-10-07 | End: 2024-10-07

## 2024-10-07 RX ORDER — ASPIRIN 81 MG/1
81 TABLET ORAL 2 TIMES DAILY
Status: DISCONTINUED | OUTPATIENT
Start: 2024-10-07 | End: 2024-10-08 | Stop reason: HOSPADM

## 2024-10-07 RX ORDER — SODIUM CHLORIDE 9 MG/ML
INJECTION, SOLUTION INTRAVENOUS CONTINUOUS
Status: DISCONTINUED | OUTPATIENT
Start: 2024-10-07 | End: 2024-10-08 | Stop reason: HOSPADM

## 2024-10-07 RX ORDER — VANCOMYCIN HYDROCHLORIDE 1 G/20ML
INJECTION, POWDER, LYOPHILIZED, FOR SOLUTION INTRAVENOUS
Status: DISCONTINUED | OUTPATIENT
Start: 2024-10-07 | End: 2024-10-07 | Stop reason: HOSPADM

## 2024-10-07 RX ORDER — MIDAZOLAM HYDROCHLORIDE 1 MG/ML
INJECTION INTRAMUSCULAR; INTRAVENOUS
Status: DISCONTINUED | OUTPATIENT
Start: 2024-10-07 | End: 2024-10-07

## 2024-10-07 RX ORDER — ONDANSETRON HYDROCHLORIDE 2 MG/ML
INJECTION, SOLUTION INTRAVENOUS
Status: DISCONTINUED | OUTPATIENT
Start: 2024-10-07 | End: 2024-10-07

## 2024-10-07 RX ORDER — KETAMINE HYDROCHLORIDE 10 MG/ML
INJECTION, SOLUTION INTRAMUSCULAR; INTRAVENOUS
Status: DISCONTINUED | OUTPATIENT
Start: 2024-10-07 | End: 2024-10-07

## 2024-10-07 RX ORDER — TALC
6 POWDER (GRAM) TOPICAL NIGHTLY PRN
Status: DISCONTINUED | OUTPATIENT
Start: 2024-10-07 | End: 2024-10-07 | Stop reason: HOSPADM

## 2024-10-07 RX ORDER — POLYETHYLENE GLYCOL 3350 17 G/17G
17 POWDER, FOR SOLUTION ORAL DAILY
Status: DISCONTINUED | OUTPATIENT
Start: 2024-10-08 | End: 2024-10-08 | Stop reason: HOSPADM

## 2024-10-07 RX ORDER — ONDANSETRON HYDROCHLORIDE 2 MG/ML
4 INJECTION, SOLUTION INTRAVENOUS EVERY 8 HOURS PRN
Status: DISCONTINUED | OUTPATIENT
Start: 2024-10-07 | End: 2024-10-08 | Stop reason: HOSPADM

## 2024-10-07 RX ORDER — GLUCAGON 1 MG
1 KIT INJECTION
Status: DISCONTINUED | OUTPATIENT
Start: 2024-10-07 | End: 2024-10-07 | Stop reason: HOSPADM

## 2024-10-07 RX ORDER — ROPIVACAINE HYDROCHLORIDE 5 MG/ML
INJECTION, SOLUTION EPIDURAL; INFILTRATION; PERINEURAL
Status: DISCONTINUED | OUTPATIENT
Start: 2024-10-07 | End: 2024-10-07 | Stop reason: HOSPADM

## 2024-10-07 RX ORDER — BISACODYL 10 MG/1
10 SUPPOSITORY RECTAL EVERY 12 HOURS PRN
Status: DISCONTINUED | OUTPATIENT
Start: 2024-10-07 | End: 2024-10-08 | Stop reason: HOSPADM

## 2024-10-07 RX ORDER — FENTANYL CITRATE 50 UG/ML
25 INJECTION, SOLUTION INTRAMUSCULAR; INTRAVENOUS EVERY 5 MIN PRN
Status: DISCONTINUED | OUTPATIENT
Start: 2024-10-07 | End: 2024-10-07 | Stop reason: HOSPADM

## 2024-10-07 RX ORDER — DEXTROMETHORPHAN HYDROBROMIDE, GUAIFENESIN 5; 100 MG/5ML; MG/5ML
650 LIQUID ORAL EVERY 8 HOURS
Qty: 120 TABLET | Refills: 0 | Status: SHIPPED | OUTPATIENT
Start: 2024-10-07

## 2024-10-07 RX ORDER — SODIUM CHLORIDE 9 MG/ML
INJECTION, SOLUTION INTRAVENOUS
Status: COMPLETED | OUTPATIENT
Start: 2024-10-07 | End: 2024-10-07

## 2024-10-07 RX ORDER — PANTOPRAZOLE SODIUM 40 MG/1
40 TABLET, DELAYED RELEASE ORAL DAILY
Qty: 30 TABLET | Refills: 0 | Status: SHIPPED | OUTPATIENT
Start: 2024-10-07

## 2024-10-07 RX ORDER — SODIUM CHLORIDE 0.9 % (FLUSH) 0.9 %
10 SYRINGE (ML) INJECTION
Status: DISCONTINUED | OUTPATIENT
Start: 2024-10-07 | End: 2024-10-07 | Stop reason: HOSPADM

## 2024-10-07 RX ORDER — PROCHLORPERAZINE EDISYLATE 5 MG/ML
5 INJECTION INTRAMUSCULAR; INTRAVENOUS EVERY 6 HOURS PRN
Status: DISCONTINUED | OUTPATIENT
Start: 2024-10-07 | End: 2024-10-08 | Stop reason: HOSPADM

## 2024-10-07 RX ORDER — MORPHINE SULFATE 2 MG/ML
2 INJECTION, SOLUTION INTRAMUSCULAR; INTRAVENOUS
Status: DISCONTINUED | OUTPATIENT
Start: 2024-10-07 | End: 2024-10-08 | Stop reason: HOSPADM

## 2024-10-07 RX ORDER — METHYLPREDNISOLONE ACETATE 40 MG/ML
INJECTION, SUSPENSION INTRA-ARTICULAR; INTRALESIONAL; INTRAMUSCULAR; SOFT TISSUE
Status: DISCONTINUED | OUTPATIENT
Start: 2024-10-07 | End: 2024-10-07 | Stop reason: HOSPADM

## 2024-10-07 RX ORDER — MUPIROCIN 20 MG/G
1 OINTMENT TOPICAL
Status: COMPLETED | OUTPATIENT
Start: 2024-10-07 | End: 2024-10-07

## 2024-10-07 RX ORDER — OXYCODONE HYDROCHLORIDE 5 MG/1
5 TABLET ORAL
Status: DISCONTINUED | OUTPATIENT
Start: 2024-10-07 | End: 2024-10-08 | Stop reason: HOSPADM

## 2024-10-07 RX ORDER — LIDOCAINE HYDROCHLORIDE 20 MG/ML
INJECTION INTRAVENOUS
Status: DISCONTINUED | OUTPATIENT
Start: 2024-10-07 | End: 2024-10-07

## 2024-10-07 RX ORDER — CEFADROXIL 500 MG/1
500 CAPSULE ORAL EVERY 12 HOURS
Qty: 14 CAPSULE | Refills: 0 | Status: SHIPPED | OUTPATIENT
Start: 2024-10-07 | End: 2024-10-15

## 2024-10-07 RX ORDER — PREGABALIN 75 MG/1
75 CAPSULE ORAL NIGHTLY
Status: DISCONTINUED | OUTPATIENT
Start: 2024-10-07 | End: 2024-10-08 | Stop reason: HOSPADM

## 2024-10-07 RX ORDER — MIDAZOLAM HYDROCHLORIDE 1 MG/ML
4 INJECTION, SOLUTION INTRAMUSCULAR; INTRAVENOUS
Status: DISCONTINUED | OUTPATIENT
Start: 2024-10-07 | End: 2024-10-07 | Stop reason: HOSPADM

## 2024-10-07 RX ORDER — AMOXICILLIN 250 MG
1 CAPSULE ORAL 2 TIMES DAILY
Status: DISCONTINUED | OUTPATIENT
Start: 2024-10-07 | End: 2024-10-08 | Stop reason: HOSPADM

## 2024-10-07 RX ORDER — NALOXONE HCL 0.4 MG/ML
0.02 VIAL (ML) INJECTION
Status: DISCONTINUED | OUTPATIENT
Start: 2024-10-07 | End: 2024-10-08 | Stop reason: HOSPADM

## 2024-10-07 RX ORDER — DEXAMETHASONE SODIUM PHOSPHATE 4 MG/ML
INJECTION, SOLUTION INTRA-ARTICULAR; INTRALESIONAL; INTRAMUSCULAR; INTRAVENOUS; SOFT TISSUE
Status: DISCONTINUED | OUTPATIENT
Start: 2024-10-07 | End: 2024-10-07

## 2024-10-07 RX ORDER — MUPIROCIN 20 MG/G
1 OINTMENT TOPICAL 2 TIMES DAILY
Status: DISCONTINUED | OUTPATIENT
Start: 2024-10-07 | End: 2024-10-08 | Stop reason: HOSPADM

## 2024-10-07 RX ORDER — HYDROMORPHONE HYDROCHLORIDE 1 MG/ML
0.2 INJECTION, SOLUTION INTRAMUSCULAR; INTRAVENOUS; SUBCUTANEOUS EVERY 5 MIN PRN
Status: DISCONTINUED | OUTPATIENT
Start: 2024-10-07 | End: 2024-10-07 | Stop reason: HOSPADM

## 2024-10-07 RX ORDER — TRANEXAMIC ACID 100 MG/ML
INJECTION, SOLUTION INTRAVENOUS
Status: DISCONTINUED | OUTPATIENT
Start: 2024-10-07 | End: 2024-10-07 | Stop reason: HOSPADM

## 2024-10-07 RX ORDER — ASPIRIN 81 MG/1
81 TABLET ORAL 2 TIMES DAILY
Qty: 60 TABLET | Refills: 0 | Status: SHIPPED | OUTPATIENT
Start: 2024-10-07 | End: 2024-11-07

## 2024-10-07 RX ORDER — METHOCARBAMOL 500 MG/1
1000 TABLET, FILM COATED ORAL ONCE
Status: COMPLETED | OUTPATIENT
Start: 2024-10-07 | End: 2024-10-07

## 2024-10-07 RX ORDER — OXYCODONE HYDROCHLORIDE 10 MG/1
10 TABLET ORAL
Status: DISCONTINUED | OUTPATIENT
Start: 2024-10-07 | End: 2024-10-08 | Stop reason: HOSPADM

## 2024-10-07 RX ORDER — PREGABALIN 75 MG/1
75 CAPSULE ORAL
Status: COMPLETED | OUTPATIENT
Start: 2024-10-07 | End: 2024-10-07

## 2024-10-07 RX ORDER — PROPOFOL 10 MG/ML
VIAL (ML) INTRAVENOUS CONTINUOUS PRN
Status: DISCONTINUED | OUTPATIENT
Start: 2024-10-07 | End: 2024-10-07

## 2024-10-07 RX ORDER — CEFAZOLIN SODIUM 1 G/3ML
INJECTION, POWDER, FOR SOLUTION INTRAMUSCULAR; INTRAVENOUS
Status: DISCONTINUED | OUTPATIENT
Start: 2024-10-07 | End: 2024-10-07

## 2024-10-07 RX ORDER — FAMOTIDINE 10 MG/ML
INJECTION INTRAVENOUS
Status: DISCONTINUED | OUTPATIENT
Start: 2024-10-07 | End: 2024-10-07

## 2024-10-07 RX ORDER — ACETAMINOPHEN 500 MG
1000 TABLET ORAL
Status: COMPLETED | OUTPATIENT
Start: 2024-10-07 | End: 2024-10-07

## 2024-10-07 RX ORDER — METHOCARBAMOL 750 MG/1
750 TABLET, FILM COATED ORAL 4 TIMES DAILY PRN
Qty: 40 TABLET | Refills: 0 | Status: SHIPPED | OUTPATIENT
Start: 2024-10-07

## 2024-10-07 RX ORDER — MORPHINE SULFATE 1 MG/ML
INJECTION, SOLUTION EPIDURAL; INTRATHECAL; INTRAVENOUS
Status: DISCONTINUED | OUTPATIENT
Start: 2024-10-07 | End: 2024-10-07 | Stop reason: HOSPADM

## 2024-10-07 RX ORDER — ACETAMINOPHEN 500 MG
1000 TABLET ORAL EVERY 6 HOURS
Status: DISCONTINUED | OUTPATIENT
Start: 2024-10-07 | End: 2024-10-08 | Stop reason: HOSPADM

## 2024-10-07 RX ORDER — ONDANSETRON HYDROCHLORIDE 2 MG/ML
4 INJECTION, SOLUTION INTRAVENOUS DAILY PRN
Status: DISCONTINUED | OUTPATIENT
Start: 2024-10-07 | End: 2024-10-07 | Stop reason: HOSPADM

## 2024-10-07 RX ADMIN — SODIUM CHLORIDE: 9 INJECTION, SOLUTION INTRAVENOUS at 11:10

## 2024-10-07 RX ADMIN — SODIUM CHLORIDE: 9 INJECTION, SOLUTION INTRAVENOUS at 04:10

## 2024-10-07 RX ADMIN — MUPIROCIN 1 G: 20 OINTMENT TOPICAL at 09:10

## 2024-10-07 RX ADMIN — METHOCARBAMOL 750 MG: 750 TABLET ORAL at 09:10

## 2024-10-07 RX ADMIN — MUPIROCIN 1 G: 20 OINTMENT TOPICAL at 10:10

## 2024-10-07 RX ADMIN — CEFAZOLIN 2 G: 330 INJECTION, POWDER, FOR SOLUTION INTRAMUSCULAR; INTRAVENOUS at 01:10

## 2024-10-07 RX ADMIN — HALOPERIDOL LACTATE 0.5 MG: 5 INJECTION, SOLUTION INTRAMUSCULAR at 05:10

## 2024-10-07 RX ADMIN — CEFAZOLIN 2 G: 2 INJECTION, POWDER, FOR SOLUTION INTRAMUSCULAR; INTRAVENOUS at 09:10

## 2024-10-07 RX ADMIN — TRANEXAMIC ACID 1000 MG: 100 INJECTION INTRAVENOUS at 03:10

## 2024-10-07 RX ADMIN — KETAMINE HYDROCHLORIDE 10 MG: 10 INJECTION INTRAMUSCULAR; INTRAVENOUS at 01:10

## 2024-10-07 RX ADMIN — METHOCARBAMOL 1000 MG: 500 TABLET ORAL at 04:10

## 2024-10-07 RX ADMIN — DEXAMETHASONE SODIUM PHOSPHATE 8 MG: 4 INJECTION, SOLUTION INTRAMUSCULAR; INTRAVENOUS at 01:10

## 2024-10-07 RX ADMIN — VANCOMYCIN HYDROCHLORIDE 1500 MG: 1.5 INJECTION, POWDER, LYOPHILIZED, FOR SOLUTION INTRAVENOUS at 10:10

## 2024-10-07 RX ADMIN — FAMOTIDINE 20 MG: 20 TABLET ORAL at 09:10

## 2024-10-07 RX ADMIN — ONDANSETRON 4 MG: 2 INJECTION INTRAMUSCULAR; INTRAVENOUS at 03:10

## 2024-10-07 RX ADMIN — SENNOSIDES AND DOCUSATE SODIUM 1 TABLET: 50; 8.6 TABLET ORAL at 09:10

## 2024-10-07 RX ADMIN — PREGABALIN 75 MG: 75 CAPSULE ORAL at 09:10

## 2024-10-07 RX ADMIN — OXYCODONE 5 MG: 5 TABLET ORAL at 04:10

## 2024-10-07 RX ADMIN — MEPIVACAINE HYDROCHLORIDE 3 ML: 15 INJECTION, SOLUTION EPIDURAL; INFILTRATION at 01:10

## 2024-10-07 RX ADMIN — FAMOTIDINE 20 MG: 10 INJECTION, SOLUTION INTRAVENOUS at 12:10

## 2024-10-07 RX ADMIN — SODIUM CHLORIDE, SODIUM GLUCONATE, SODIUM ACETATE, POTASSIUM CHLORIDE, MAGNESIUM CHLORIDE, SODIUM PHOSPHATE, DIBASIC, AND POTASSIUM PHOSPHATE: .53; .5; .37; .037; .03; .012; .00082 INJECTION, SOLUTION INTRAVENOUS at 02:10

## 2024-10-07 RX ADMIN — ASPIRIN 81 MG: 81 TABLET, COATED ORAL at 09:10

## 2024-10-07 RX ADMIN — PROCHLORPERAZINE EDISYLATE 5 MG: 5 INJECTION INTRAMUSCULAR; INTRAVENOUS at 06:10

## 2024-10-07 RX ADMIN — PROPOFOL 125 MCG/KG/MIN: 10 INJECTION, EMULSION INTRAVENOUS at 01:10

## 2024-10-07 RX ADMIN — SODIUM CHLORIDE: 9 INJECTION, SOLUTION INTRAVENOUS at 10:10

## 2024-10-07 RX ADMIN — ONDANSETRON 4 MG: 2 INJECTION INTRAMUSCULAR; INTRAVENOUS at 08:10

## 2024-10-07 RX ADMIN — ACETAMINOPHEN 1000 MG: 500 TABLET ORAL at 10:10

## 2024-10-07 RX ADMIN — MIDAZOLAM HYDROCHLORIDE 2 MG: 2 INJECTION, SOLUTION INTRAMUSCULAR; INTRAVENOUS at 12:10

## 2024-10-07 RX ADMIN — LIDOCAINE HYDROCHLORIDE 50 MG: 20 INJECTION INTRAVENOUS at 01:10

## 2024-10-07 RX ADMIN — TRANEXAMIC ACID 2000 MG: 100 INJECTION INTRAVENOUS at 01:10

## 2024-10-07 RX ADMIN — PREGABALIN 75 MG: 75 CAPSULE ORAL at 10:10

## 2024-10-07 NOTE — HPI
Albertina Sim is a 64 y.o. female with history of Right knee pain. Pain is worse with activity and weight bearing.  Patient has experienced interference of activities of daily living due to decreased range of motion and an increase in joint pain and swelling.  Patient has failed non-operative treatment including NSAIDs, corticosteroid injections, viscosupplement injections, and activity modification.  Albertina Sim currently ambulates independently.      Relevant medical conditions of significance in perioperative period:  CKD stage 3- monitored by pcp  Obesity- on wegovy managed by pcp

## 2024-10-07 NOTE — ANESTHESIA POSTPROCEDURE EVALUATION
Anesthesia Post Evaluation    Patient: Albertina Sim    Procedure(s) Performed: Procedure(s) (LRB):  ARTHROPLASTY, KNEE, TOTAL, USING [employer] COMPUTER-ASSISTED NAVIGATION: RIGHT: ROMARIO - CICI (Right)    Final Anesthesia Type: spinal      Patient location during evaluation: PACU  Patient participation: Yes- Able to Participate  Level of consciousness: awake and alert and oriented  Post-procedure vital signs: reviewed and stable  Pain management: adequate  Airway patency: patent  ADONAY mitigation strategies: Multimodal analgesia  PONV status at discharge: No PONV  Anesthetic complications: no      Cardiovascular status: blood pressure returned to baseline and hemodynamically stable  Respiratory status: unassisted, spontaneous ventilation and room air  Hydration status: euvolemic  Follow-up not needed.              Vitals Value Taken Time   /89 10/07/24 1741   Temp 36.2 °C (97.1 °F) 10/07/24 1741   Pulse 66 10/07/24 1741   Resp 16 10/07/24 1741   SpO2 97 % 10/07/24 1741         Event Time   Out of Recovery 17:27:00         Pain/Joshua Score: Pain Rating Prior to Med Admin: 2 (10/7/2024  4:23 PM)  Joshua Score: 10 (10/7/2024  5:06 PM)

## 2024-10-07 NOTE — PT/OT/SLP PROGRESS
Occupational Therapy      Patient Name:  Albertina Sim   MRN:  [id number]    Patient not seen today secondary to late sx. Will follow-up tomorrow.    10/7/2024

## 2024-10-07 NOTE — ANESTHESIA PROCEDURE NOTES
Spinal    Diagnosis: osteoarthritis  Patient location during procedure: OR  Start time: 10/7/2024 1:10 PM  Timeout: 10/7/2024 1:10 PM  End time: 10/7/2024 1:15 PM    Staffing  Authorizing Provider: Rommel Combs III, MD  Performing Provider: Rommel Combs III, MD    Staffing  Performed by: Rommel Combs III, MD  Authorized by: Rommel Combs III, MD    Preanesthetic Checklist  Completed: patient identified, IV checked, site marked, risks and benefits discussed, surgical consent, monitors and equipment checked, pre-op evaluation and timeout performed  Spinal Block  Patient position: sitting  Prep: ChloraPrep  Patient monitoring: heart rate, cardiac monitor, continuous pulse ox and frequent blood pressure checks  Approach: midline  Location: L3-4  Injection technique: single shot  CSF Fluid: clear free-flowing CSF  Needle  Needle type: Geovanna   Needle gauge: 25 G  Needle length: 5 in  Additional Documentation: incremental injection and negative aspiration for heme  Needle localization: anatomical landmarks  Assessment  Ease of block: easy  Patient's tolerance of the procedure: comfortable throughout block and no complaints  Medications:    Medications: mepivacaine (CARBOCAINE) injection 15 mg/mL (1.5%) - Other   3 mL - 10/7/2024 1:15:00 PM

## 2024-10-07 NOTE — TRANSFER OF CARE
"Anesthesia Transfer of Care Note    Patient: Albertina Sim    Procedure(s) Performed: Procedure(s) (LRB):  ARTHROPLASTY, KNEE, TOTAL, USING [facility] Temperature Management COMPUTER-ASSISTED NAVIGATION: RIGHT: ROMARIO BOLANOS (Right)    Patient location: PACU    Anesthesia Type: general and spinal    Transport from OR: Transported from OR on 100% O2 by closed face mask with adequate spontaneous ventilation    Post pain: adequate analgesia    Post assessment: no apparent anesthetic complications and tolerated procedure well    Post vital signs: stable    Level of consciousness: awake and responds to stimulation    Nausea/Vomiting: no nausea/vomiting    Complications: none    Transfer of care protocol was followed    Last vitals: Visit Vitals  /79 (BP Location: Left arm, Patient Position: Lying)   Pulse 73   Temp 36.6 °C (97.8 °F) (Skin)   Resp 16   Ht 5' 6" (1.676 m)   Wt 103.9 kg (229 lb)   LMP 01/01/2006 (Approximate)   SpO2 99%   Breastfeeding No   BMI 36.96 kg/m²     "

## 2024-10-07 NOTE — PROGRESS NOTES
Pre op completed.  Patient belongings to be placed in a locker. Bed in lowest position. Call light within reach. Family at beside. DME needed. Bear hugger refused.

## 2024-10-07 NOTE — ANESTHESIA PREPROCEDURE EVALUATION
10/07/2024  Albertina Sim is a 64 y.o., female.      Pre-op Assessment    I have reviewed the Patient Summary Reports.     I have reviewed the Nursing Notes.    I have reviewed the Medications.     Review of Systems  Anesthesia Hx:  No problems with previous Anesthesia             Denies Family Hx of Anesthesia complications.     Cardiovascular:  Cardiovascular Normal Exercise tolerance: good                                           Pulmonary:  Pulmonary Normal                       Renal/:  Chronic Renal Disease, CKD                Musculoskeletal:  Arthritis               Neurological:    Neuromuscular Disease,                                     Past Medical History:   Diagnosis Date    Arthritis     Disorder of kidney and ureter     Hyperlipidemia     Sickle cell trait     Vitamin D deficiency      Past Surgical History:   Procedure Laterality Date    COLONOSCOPY N/A 2/14/2023    Procedure: COLONOSCOPY;  Surgeon: Alessandro Cedeño MD;  Location: [facility] ([address] [place] [address]);  Service: Endoscopy;  Laterality: N/A;  instr emailed-ml  2/7/23 pre call complete - [email or web address]     Patient Active Problem List   Diagnosis    Hallux valgus (acquired)    DJD (degenerative joint disease) of knee    Sciatica of right side    Anemia, sickle trait, documented since 2005    Sickle cell trait    Chronic midline low back pain without sciatica    Primary osteoarthritis of left knee    Chronic pain of left knee    Elevated blood pressure reading    Vitamin D deficiency    Nocturnal leg cramps    Screen for colon cancer, next 03/2022    Atypical chest pain    ARRIETA (dyspnea on exertion)    Hypercholesterolemia    Nonspecific abnormal electrocardiogram (ECG) (EKG)    Abnormal chest x-ray    Primary osteoarthritis of right knee    Chronic kidney disease, stage 3a    Body mass index (BMI) of 35.0 to 35.9 with comorbidity    Chronic pain of  right knee    Decreased range of motion of right knee     No current facility-administered medications on file as of 10/7/2024.     Outpatient Medications as of 10/7/2024   Medication Sig Dispense Refill    cholecalciferol, vitamin D3, (VITAMIN D3) 2,000 unit Cap Take 1 capsule (2,000 Units total) by mouth once daily.      multivitamin (THERAGRAN) per tablet Take 1 tablet by mouth once daily.        Review of patient's allergies indicates:   Allergen Reactions    No known drug allergies         Physical Exam  General: Well nourished, Cooperative, Alert and Oriented    Airway:  Mallampati: II   Mouth Opening: Normal  TM Distance: Normal  Tongue: Normal  Neck ROM: Normal ROM    Chest/Lungs:  Normal Respiratory Rate    Heart:  Rate: Normal      Wt Readings from Last 3 Encounters:   09/23/24 99.3 kg (219 lb)   09/20/24 99.8 kg (219 lb 14.9 oz)   09/20/24 99.7 kg (219 lb 14.5 oz)     Temp Readings from Last 3 Encounters:   09/20/24 36.8 °C (98.2 °F) (Oral)   03/07/23 36.7 °C (98.1 °F) (Oral)   02/14/23 36.5 °C (97.7 °F)     BP Readings from Last 3 Encounters:   09/23/24 (!) 140/87   09/20/24 133/75   07/29/24 125/82     Pulse Readings from Last 3 Encounters:   09/23/24 76   09/20/24 77   07/29/24 85     Lab Results   Component Value Date    WBC 6.77 07/29/2024    HGB 12.2 07/29/2024    HCT 37.0 07/29/2024    MCV 89 07/29/2024     07/29/2024         Chemistry        Component Value Date/Time     07/29/2024 0906    K 4.5 07/29/2024 0906     07/29/2024 0906    CO2 25 07/29/2024 0906    BUN 14 07/29/2024 0906    CREATININE 1.1 07/29/2024 0906    GLU 89 07/29/2024 0906        Component Value Date/Time    CALCIUM 10.7 (H) 07/29/2024 0906    ALKPHOS 79 07/29/2024 0906    AST 20 07/29/2024 0906    ALT 13 07/29/2024 0906    BILITOT 0.8 07/29/2024 0906    ESTGFRAFRICA >60.0 06/01/2022 0856    EGFRNONAA 59.7 (A) 06/01/2022 0856        Results for orders placed or performed in visit on 03/07/23   EKG 12-lead     Collection Time: 03/07/23  4:28 PM    Narrative    Test Reason :     Vent. Rate : 066 BPM     Atrial Rate : 066 BPM     P-R Int : 152 ms          QRS Dur : 082 ms      QT Int : 396 ms       P-R-T Axes : 028 -17 004 degrees     QTc Int : 415 ms    Normal sinus rhythm  Low anterior forces  Abnormal ECG  When compared with ECG of 07-MAR-2023 15:42,  No significant change was found  Confirmed by Sarkis Sutton MD ([id number]) on 3/8/2023 2:30:33 PM    Referred By:             Confirmed By:Sarkis Sutton MD        Anesthesia Plan  Type of Anesthesia, risks & benefits discussed:    Anesthesia Type: Gen Natural Airway, CSE, Spinal, Epidural  Intra-op Monitoring Plan: Standard ASA Monitors  Post Op Pain Control Plan: multimodal analgesia and IV/PO Opioids PRN  Induction:  IV  Informed Consent: Informed consent signed with the Patient and all parties understand the risks and agree with anesthesia plan.  All questions answered.   ASA Score: 2  Day of Surgery Review of History & Physical: H&P Update referred to the surgeon/provider.    Ready For Surgery From Anesthesia Perspective.     .

## 2024-10-07 NOTE — PLAN OF CARE
Care plan reviewed w/ pt and family at bedside. AVSS on RA. R knee dressing CDI. Xrays done. Pain controlled w/ PRN medications. Spinal resolved, pt able to move BLE. Report given to [facility]. Pt transferred to room 304.

## 2024-10-07 NOTE — OP NOTE
Ronald Right TKA  OPERATIVE NOTE    Date of Operation:   10/7/2024    Providers Performing:   Surgeon(s):  Ramses Cardenas III, MD  Assistant: SMA Manjula    Operative Procedure:   Right Total Knee Arthroplasty, using Depuy VELYS robotic assisted solution, CPT 11960  Computer-assisted surgical navigational procedure for musculoskeletal procedures, image-less, CPT 15051  Surgical techniques requiring use of robotic surgical system, CPT     -22 modifier for CDC class 2 or higher obesity (BMI 36.9) and terrible fixed varus arthritis with significant medal tibia and femur sclerosis requiring prolonged operative time and increased case complexity and difficulty      Preoperative Diagnosis:   Right Knee Osteoarthritis, ICD-10 M17.11    Postoperative Diagnosis:   SAME    Components Used:   Depuy  Femur: Attune PS Size 6  Tibia: Attune all poly AOX PS Size 6 x  6mm  Patella: Attune Medialized Dome 38 mm    2 packs cement: Simplex P    Implant Name Type Inv. Item Serial No.  Lot No. LRB No. Used Action   CEMENT BONE SURG SMPLX P RADPQ - [id number]  CEMENT BONE SURG SMPLX P RADPQ  [employer]. [id number] Right 1 Implanted   CEMENT BONE SURG SMPLX P RADPQ - [id number]  CEMENT BONE SURG SMPLX P RADPQ  [employer]. [id number] Right 1 Implanted   attune knee system all poly tibial implant    [employer] [id number] Right 1 Implanted   COMP FEM POS ATTUNE SZ6 R - [id number]  COMP FEM POS ATTUNE SZ6 R  [employer] [id number] Right 1 Implanted   PATELLA ATTUNE MED PAT 38MM - [id number]  PATELLA ATTUNE MED PAT 38MM  [employer] [id number] Right 1 Implanted       Anesthesia:   Spinal    LUCY cocktail: Cardenas Block, no toradol    Estimated Blood Loss:   350 cc    Specimens:   None    Complications:   None    Indications:   The patient has failed non-operative measures including medications, injections and activity modifications for their knee.  After an explanation of risks and benefits of knee arthroplasty surgery, the  patient wishes to proceed with surgery.    Operative Notes:   The patient was greeted in the pre-op holding area.  The patients knee was marked with a surgical marker by the surgeon.  Anesthesia per above technique was administered by the anesthesia team.  The patient was placed in the supine position on the OR table with all bony prominences padded.  A tourniquet was not used.  IV antibiotics were administered.  The leg was prepped and draped in the usual sterile fashion.  A timeout was performed and the correct patient, site and procedure were identified.    A midline incision was made with a standard medial parapatellar arthrotomy.  The standard medial capsular release was performed along with the lateral gutter.  The patella was mobilized from the lateral parapatellar ligament and bony osteophytes were removed.  The intercondylar notch was cleared of the ACL and PCL.    The femoral and tibial guide pins where placed and the arrays were positioned and tightened to the pins. The hip was then brought through a range of motion and the hip center was identified, medial and lateral malleolus were identified and the tibia and femur were registered.     Using a tibia first with tensioner technique the joint was first tensioned manually in extension and flexion and through the full range of motion to define our gaps. Provisional cuts/implants were positioned virtually for appropriate size gaps and implant placement.    The VELYS robot was then brought into position and checkpoints were confirmed. Care was taken during the burring process to protect the soft tissue. We cut the tibia. The tensioner was then placed in the joint and the knee was again extension and flexion and through the full range of motion to define our gaps. The cuts/implants were positioned virtually for appropriate size gaps and implant placement and the femoral cuts were then made.     The PS box was cut. Meniscal remnants were removed and the knee  was brought into flexion and posterior osteophytes were removed.      At this time the trial implants were placed and knee assessed for stability, and flexion arc. The patella was prepared using free-hand technique and the three-holed lug drill guide was sized and appropriately assessed. Patella tracking was found to be acceptable. The tibial component rotation was marked. The trials and guide pins were removed. The tibial component was positioned and the keel was drilled and punched.    The wound was copiously irrigated with pulsitile lavage and the bony surfaces dried.  Cement was mixed on the back table and applied to all components.  Cementation of the tibial, femoral, and patellar components was performed sequentially with removal of all excess cement. While the cement dried and a 0.35% betadine solution was left to soak in the surgical field.     After the cement was dry hemostasis was obtained with bovie electrocautery.  The knee was again copiously irrigated with pulsatile lavage.     1 gram of vancomycin powder was placed in the knee. The arthrotomy was closed with interrupted #1 vicryl suture and #2 quill, the subcuticular layer was closed with 2-0 vicryl suture and a running 4-0 monocryl was used to closed the skin surface.  Pin sites were closed with 4-0 monocryl and skin glue. Surgicel sealant was placed over the top of the incision and sterile dressing placed over the wound. Appropriately sized TEDs hose were placed for edema control.     Sponge and needle counts were correct.    The first-assistant was critical to all steps of the operation, including retraction and leg stabilization during exposure and bone preparation, as well as the deep and superficial wound closure.  Dr. Cardenas performed and/or supervised the key portions of this surgical procedure, including evaluation of the bone cuts, reshaping of the bony elements, and insertion of the provisional and final components.    Postoperative  Plan:  The patient will be anticoagulated with 81mg aspirin twice daily for one month.   They will receive 24 hours of post-operative antibiotics.    Activity will be weight bearing as tolerated.    No cbrex  Brother in law here, will help   HH PT      Due patient and or surgical factors the patient will receive an Rx for cefadroxil 500mg BID x7 days after discharge per [place] data:   Shruthi MM, Nia JE, Yen M, Cristian PASCAL. The [facility] [place] Clinical Research Award: Extended Oral Antibiotics Prevent Periprosthetic Joint Infection in High-Risk Cases: 3855 Patients With 1-Year Follow-Up. J Arthroplasty. 2021 Jul;36(7S):S18-S25. doi: 10.1016/j.arth.2021.01.051. Epub 2021 Jan 23. PMID: [id number].      Signed by: Ramses Cardenas III, MD

## 2024-10-07 NOTE — PLAN OF CARE
Plan of care reviewed with patient; verbalized understanding.   Medications reviewed and administered as ordered.  Rounding for safety and patient care per policy.   Safety precautions maintained. Patient working appropriately with PT/OT.  DME at bedside.  Call light within reach, bed wheels locked, bed in lowest position, side rails ^x2, safety maintained. NADN, Will continue monitor.        Problem: Wound  Goal: Absence of Infection Signs and Symptoms  Intervention: Prevent or Manage Infection  Flowsheets (Taken 10/7/2024 1753)  Fever Reduction/Comfort Measures: ice pack(s) applied  Infection Management: aseptic technique maintained     Problem: Wound  Goal: Skin Health and Integrity  Intervention: Optimize Skin Protection  Flowsheets (Taken 10/7/2024 1753)  Pressure Reduction Techniques: frequent weight shift encouraged     Problem: Knee Arthroplasty  Goal: Absence of Bleeding  Intervention: Monitor and Manage Bleeding  Flowsheets (Taken 10/7/2024 1753)  Bleeding Management: dressing monitored     Problem: Knee Arthroplasty  Goal: Absence of Infection Signs and Symptoms  Intervention: Prevent or Manage Infection  Flowsheets (Taken 10/7/2024 1753)  Fever Reduction/Comfort Measures: ice pack(s) applied  Infection Management: aseptic technique maintained     Problem: Fall Injury Risk  Goal: Absence of Fall and Fall-Related Injury  Intervention: Identify and Manage Contributors  Flowsheets (Taken 10/7/2024 1753)  Self-Care Promotion:   BADL personal objects within reach   BADL personal routines maintained   adaptive equipment use encouraged  Medication Review/Management: medications reviewed     Problem: Fall Injury Risk  Goal: Absence of Fall and Fall-Related Injury  Intervention: Promote Injury-Free Environment  Flowsheets (Taken 10/7/2024 1753)  Safety Promotion/Fall Prevention:   assistive device/personal item within reach   Fall Risk signage in place   family expresses understanding of fall risk and prevention    Fall Risk reviewed with patient/family   lighting adjusted   medications reviewed   nonskid shoes/socks when out of bed   patient expresses understanding of fall risk and prevention   side rails raised x 2   instructed to call staff for mobility

## 2024-10-07 NOTE — HOSPITAL COURSE
On 10/7/24, the patient arrived to the [facility] for proper pre-operative management.  Upon completion of pre-operative preparation, the patient was taken back to the operative theatre. A R TKA was performed without complication and the patient was transported to the post anesthesia care unit in stable condition.  After appropriate recovery from the anaesthetic agents used during the surgery, the patient was then transported to the hospital inpatient floor.  The interim of the hospital stay from arrival on the floor up to discharge has been uncomplicated. The patient has tolerated regular diet.  The patient's pain has been controlled using a multimodal approach. Currently, the patient's pain is well controlled on an oral regimen.  The patient has been voiding without difficulty.  The patient began participation in physical therapy after surgery and has progressed throughout the entire hospital stay.  Currently, the patient's progress is sufficient to allow the them to be discharged to home safely.  The patient agrees with this assessment and desires a discharge today.

## 2024-10-07 NOTE — PT/OT/SLP PROGRESS
Physical Therapy      Patient Name:  Albertina Sim   MRN:  [id number]    Patient not seen today secondary to late surgery. Will follow-up again as able.

## 2024-10-08 VITALS
HEART RATE: 71 BPM | TEMPERATURE: 98 F | DIASTOLIC BLOOD PRESSURE: 84 MMHG | BODY MASS INDEX: 36.8 KG/M2 | OXYGEN SATURATION: 100 % | HEIGHT: 66 IN | SYSTOLIC BLOOD PRESSURE: 137 MMHG | WEIGHT: 229 LBS | RESPIRATION RATE: 18 BRPM

## 2024-10-08 PROCEDURE — 63600175 PHARM REV CODE 636 W HCPCS: Performed by: NURSE PRACTITIONER

## 2024-10-08 PROCEDURE — 97165 OT EVAL LOW COMPLEX 30 MIN: CPT

## 2024-10-08 PROCEDURE — 25000003 PHARM REV CODE 250

## 2024-10-08 PROCEDURE — 97535 SELF CARE MNGMENT TRAINING: CPT

## 2024-10-08 PROCEDURE — 99900035 HC TECH TIME PER 15 MIN (STAT)

## 2024-10-08 PROCEDURE — 97116 GAIT TRAINING THERAPY: CPT

## 2024-10-08 PROCEDURE — 99499 UNLISTED E&M SERVICE: CPT | Mod: ,,, | Performed by: SURGERY

## 2024-10-08 PROCEDURE — 97110 THERAPEUTIC EXERCISES: CPT

## 2024-10-08 PROCEDURE — 25000003 PHARM REV CODE 250: Performed by: NURSE PRACTITIONER

## 2024-10-08 PROCEDURE — 97162 PT EVAL MOD COMPLEX 30 MIN: CPT

## 2024-10-08 PROCEDURE — 94761 N-INVAS EAR/PLS OXIMETRY MLT: CPT

## 2024-10-08 PROCEDURE — 97530 THERAPEUTIC ACTIVITIES: CPT

## 2024-10-08 RX ADMIN — ACETAMINOPHEN 1000 MG: 500 TABLET ORAL at 05:10

## 2024-10-08 RX ADMIN — OXYCODONE HYDROCHLORIDE 5 MG: 5 TABLET ORAL at 09:10

## 2024-10-08 RX ADMIN — ASPIRIN 81 MG: 81 TABLET, COATED ORAL at 08:10

## 2024-10-08 RX ADMIN — CHOLECALCIFEROL TAB 25 MCG (1000 UNIT) 2000 UNITS: 25 TAB at 08:10

## 2024-10-08 RX ADMIN — MUPIROCIN 1 G: 20 OINTMENT TOPICAL at 08:10

## 2024-10-08 RX ADMIN — SODIUM CHLORIDE: 9 INJECTION, SOLUTION INTRAVENOUS at 12:10

## 2024-10-08 RX ADMIN — METHOCARBAMOL 750 MG: 750 TABLET ORAL at 08:10

## 2024-10-08 RX ADMIN — CEFAZOLIN 2 G: 2 INJECTION, POWDER, FOR SOLUTION INTRAMUSCULAR; INTRAVENOUS at 05:10

## 2024-10-08 RX ADMIN — FAMOTIDINE 20 MG: 20 TABLET ORAL at 08:10

## 2024-10-08 RX ADMIN — SENNOSIDES AND DOCUSATE SODIUM 1 TABLET: 50; 8.6 TABLET ORAL at 08:10

## 2024-10-08 RX ADMIN — THERA TABS 1 TABLET: TAB at 08:10

## 2024-10-08 RX ADMIN — ACETAMINOPHEN 1000 MG: 500 TABLET ORAL at 12:10

## 2024-10-08 RX ADMIN — POLYETHYLENE GLYCOL 3350 17 G: 17 POWDER, FOR SOLUTION ORAL at 08:10

## 2024-10-08 NOTE — NURSING
Has met unit/department guidelines for discharge from each phase of the post procedure continuum. Patient discharged.  Instructions, placed in dc folder and Prescriptions given.  IV removed, tolerated well, w/ catheter intact, no redness or swelling to area. . Dressing to right knee remains CDI. Patient verbalized understanding instructions.  AAOx3, VSS, NADN, no complaints of pain noted at this time.  Wheelchair to private vehicle in care of spouse.  All personal belongings sent with pt.  Blue Bracelet given applied to pts wrist and instructions given to call # on bracelet w/any surgery related issues.

## 2024-10-08 NOTE — ASSESSMENT & PLAN NOTE
Albertina Sim is a 64 y.o. female is s/p R TKA on 10/7/2024    Surgical dressing C/D/I, ice in place  Pain control: multimodal, Anesthesia Surgical Home following  PT/OT: WBAT RLE  DVT PPx: ASA 81 BID, FCDs at all times when not ambulating  Abx: postop Ancef  Drain: none  [place]: none    Dispo: plan to dc to home with home health today once cleared by PT/OT     This encounter was created in error - please disregard.

## 2024-10-08 NOTE — PROGRESS NOTES
"[facility])  Orthopedics  Progress Note    Patient Name: Albertina Sim  MRN: [id number]  Admission Date: 10/7/2024  Hospital Length of Stay: 0 days  Attending Provider: Ramses Cardenas III, MD  Primary Care Provider: Angelia Roldan MD  Follow-up For: Procedure(s) (LRB):  ARTHROPLASTY, KNEE, TOTAL, USING [employer] COMPUTER-ASSISTED NAVIGATION: RIGHT: DEPUY - ATTUNE (Right)    Post-Operative Day: 1 Day Post-Op  Subjective:     Principal Problem:Primary osteoarthritis of right knee    Principal Orthopedic Problem: Same    Interval History: NAEON, patient stable, pain controlled. No acute complaints this morning. , Some nausea and emesis last night but improved this AM.  NO PT yesterday due to late surgery.       Review of patient's allergies indicates:   Allergen Reactions    No known drug allergies        Current Facility-Administered Medications   Medication    0.9%  NaCl infusion    acetaminophen tablet 1,000 mg    aspirin EC tablet 81 mg    bisacodyL suppository 10 mg    famotidine tablet 20 mg    methocarbamoL tablet 750 mg    morphine injection 2 mg    multivitamin tablet    mupirocin 2 % ointment 1 g    naloxone 0.4 mg/mL injection 0.02 mg    ondansetron injection 4 mg    oxyCODONE immediate release tablet 5 mg    oxyCODONE immediate release tablet Tab 10 mg    polyethylene glycol packet 17 g    pregabalin capsule 75 mg    prochlorperazine injection Soln 5 mg    senna-docusate 8.6-50 mg per tablet 1 tablet    vitamin D 1000 units tablet 2,000 Units     Objective:     Vital Signs (Most Recent):  Temp: 97.1 °F (36.2 °C) (10/08/24 0501)  Pulse: 66 (10/08/24 0501)  Resp: 16 (10/08/24 0501)  BP: 115/67 (10/08/24 0501)  SpO2: 97 % (10/08/24 0501) Vital Signs (24h Range):  Temp:  [96.8 °F (36 °C)-97.8 °F (36.6 °C)] 97.1 °F (36.2 °C)  Pulse:  [56-74] 66  Resp:  [12-18] 16  SpO2:  [97 %-100 %] 97 %  BP: ()/(64-89) 115/67     Weight: 103.9 kg (229 lb)  Height: 5' 6" (167.6 cm)  Body mass " index is 36.96 kg/m².      Intake/Output Summary (Last 24 hours) at 10/8/2024 0614  Last data filed at 10/8/2024 0043  Gross per 24 hour   Intake 2120 ml   Output 1450 ml   Net 670 ml        Ortho/SPM Exam  AAOx4  NAD  Reg rate  No increased WOB    RLE:  Dressing c/d/i  Ice in place  SILT T/SP/DP/Arambula/Sa  Motor intact T/SP/DP  WWP extremities  FCDs in place and functioning       Significant Labs:   Recent Lab Results       None          All pertinent labs within the past 24 hours have been reviewed.    Significant Imaging: I have reviewed and interpreted all pertinent imaging results/findings.  Assessment/Plan:     * Primary osteoarthritis of right knee  Albertina Sim is a 64 y.o. female is s/p R TKA on 10/7/2024    Surgical dressing C/D/I, ice in place  Pain control: multimodal, Anesthesia Surgical Home following  PT/OT: WBAT RLE  DVT PPx: ASA 81 BID, FCDs at all times when not ambulating  Abx: postop Ancef  Drain: none  [place]: none    Dispo: plan to dc to home with home health today once cleared by PT/OT            Marlon Araiza MD  Orthopedics  [facility] ([facility])

## 2024-10-08 NOTE — PLAN OF CARE
Problem: Physical Therapy  Goal: Physical Therapy Goal  Description: Goals to be met by: 10/8/24      Pt met goals at al to demonstrate safe level of mobility for d/c home with her brother in law to assist to assist PRN and OPPT to begin 10/9/24.      Patient demonstrates a mobility limitation that significantly impairs their ability to participate in one or more mobility related activities of daily living. Patient's mobility limitation cannot be sufficiently resolved with the use of a cane, but can be sufficiently resolved with the use of a rolling walker.The use of a rolling walker will considerably improve their ability to participate in MRADLs. Patient will use the walker on a regular basis at home.        Outcome: Met

## 2024-10-08 NOTE — PT/OT/SLP EVAL
Physical Therapy Evaluation and Treatment and Discharge Summary    Patient Name: Albertina Sim   MRN: [id number]  Recent Surgery: Procedure(s) (LRB):  ARTHROPLASTY, KNEE, TOTAL, USING [employer] COMPUTER-ASSISTED NAVIGATION: RIGHT: DEPUY - ATTUNE (Right) 1 Day Post-Op    Recommendations:     Discharge Recommendations: Low Intensity Therapy   Discharge Equipment Recommendations: bedside commode, shower chair, walker, rolling   Barriers to discharge: None    Assessment:     Albertina Sim is a 64 y.o. female admitted with a medical diagnosis of Primary osteoarthritis of right knee. She presents with the following impairments/functional limitations: weakness, impaired endurance, impaired self care skills, impaired functional mobility, gait instability, impaired balance, decreased lower extremity function, pain, decreased ROM, edema. Pt presents s/p R TKA with expected post-op deficits.  Pt did really well with PT today and was able to amb 180' with RW and CGA/SBA .    She ascended/descended 4 steps with R railing  and CGA  She did well with no LOB and no dizziness, but she did have mild nausea during session.  Pt is ready for d/c home today from PT standpoint with her brother-in-law  to assist PRN and  will benefit from OPPT for cont PT to maximize  functional recovery, strength and ROM.        Rehab Prognosis: Good; patient would benefit from cont PT services post d/c  to address these deficits and reach maximum level of function.    Plan:     During this hospitalization, patient to be d/c home today with her brother-in-law to assist PRN and OPPT to address the above listed problems via gait training, therapeutic activities, therapeutic exercises    Plan of Care Expires: 11/07/24    Subjective     Chief Complaint: Pt reports feeling nauseated after gait training.   Patient Comments/Goals: Riding her bike, walking and [place]  Pain/Comfort:  Pain Rating 1: 4/10  Location - Side 1: Right  Location 1:  knee  Pain Addressed 1: Pre-medicate for activity, Reposition, Distraction  Pain Rating Post-Intervention 1: 4/10    Social History:  Living Environment: Patient lives alone in a 2 story home with number of outside stair(s): 3 LYDUMILA with R railing, number of inside stair(s): full flight, bed/bath on 2nd floor, can live on 1st floor in the guest room, and tub-shower combo.  She will go to her brother-in-law's [facility] after d/c with 5 LYUDMILA and R railing at the front entrance and a ramped entrance at the back entrance. His house has a walk in shower with a bench.  Prior Level of Function: Prior to admission, patient was independent  Equipment Used at Home: none  DME owned (not currently used): bedside commode, ordered a RW  Assistance Upon Discharge:  her brother-in-law and family    Objective:     Communicated with RN prior to session. Patient found up in chair with cryotherapy, peripheral IV, FCD upon PT entry to room.  Pt's brother-in-law present in room and for entire PT session.     General Precautions: Standard, fall   Orthopedic Precautions: RLE weight bearing as tolerated   Braces: N/A    Respiratory Status: Room air    Exams:  RLE ROM: WFL except decrease knee flex/ext due to pain  RLE Strength:  at least 3/5  LLE ROM: WNL  LLE Strength: WNL  Cognitive: Patient is oriented to Person, Place, Time, Situation  Gross Motor Coordination: WFL  Postural Exam: Patient presented with the following abnormalities:    -       Rounded shoulders  -       Forward head  Sensation:    -       Intact    Functional Mobility:  Gait belt applied - Yes  Bed Mobility  Supine to Sit: supervision for LE management and trunk management  Sit to Supine: supervision for LE management  Transfers  Sit to Stand: stand by assistance with rolling walker and with cues for hand placement and foot placement  Gait  Patient ambulated 180' with rolling walker and contact guard assistance initially but able to progress to SBA. Patient required cues for heel  strike, position in walker, step through gait pattern, upright posture, and weight bearing status to increase independence and safety. Patient required cues ~ 25% of the time.  Pt amb with decrease nicola and step length with a step-to gait pattern.  Balance  Sitting: GOOD: Maintains balance through MODERATE excursions of active trunk movement  Standing: FAIR: Needs CONTACT GUARD during gait  Stairs: Pt ascended/descended 4 stair(s) with No Assistive Device with right handrail with Contact Guard Assistance.     Therapeutic Activities and Exercises:  Patient educated on role of acute care PT and PT POC, safety while in hospital including calling nurse for mobility, and call light usage.  Educated about importance of OOB mobility and remaining up in chair most of the day.  Pt ed on WBAT R LE   Pt instructed on and performed therapeutic ex's for TKA HEP (QS, AP, GS, HS, Hip abd, SLR, SAQ, LAQ) x 10 reps x 3 sets each for muscle strengthening, endurance and increase knee ROM. Pt demonstrated good understanding back to PT. Patient required skilled PT for instruction of exercises and appropriate cues to perform exercises safely and appropriately.  Issued written handout of TKA HEP and reviewed with pt.  Pt ed on importance of elevating  LE above level of her heart 3-4 times a day and proper placement of pillows to keep knee extended and not flexed.  Pt and her caregiver verbalized good understanding back to PT.    Pt ed on mobility expectations at home after d/c and she verbalized good understanding back to PT  PT reviewed and demonstrated car transfers with pt and caregiver and answered all questions.  Pt and caregiver verbalized good understanding back to PT.   Pt ed on use of cryotherapy for edema and pain control, and on safety awareness with use of RW in the home and pt verbalized good understanding back to PT.   Pt with seated rest break b/t gait training and stair training.     AM-PAC 6 CLICK MOBILITY  Total  Score:18    Patient left up in chair with all lines intact, call button in reach, RN notified, and brother-in-law present.    GOALS:   Multidisciplinary Problems       Physical Therapy Goals       Not on file              Multidisciplinary Problems (Resolved)          Problem: Physical Therapy    Goal Priority Disciplines Outcome Interventions   Physical Therapy Goal   (Resolved)     PT, PT/OT Met    Description: Goals to be met by: 10/8/24      Pt met goals at [facility] to demonstrate safe level of mobility for d/c home with her brother in law to assist to assist PRN and OPPT to begin 10/9/24.      Patient demonstrates a mobility limitation that significantly impairs their ability to participate in one or more mobility related activities of daily living. Patient's mobility limitation cannot be sufficiently resolved with the use of a cane, but can be sufficiently resolved with the use of a rolling walker.The use of a rolling walker will considerably improve their ability to participate in MRADLs. Patient will use the walker on a regular basis at home.                             History:     Past Medical History:   Diagnosis Date    Arthritis     Disorder of kidney and ureter     Hyperlipidemia     Sickle cell trait     Vitamin D deficiency        Past Surgical History:   Procedure Laterality Date    ARTHROPLASTY, KNEE, TOTAL, USING COMPUTER-ASSISTED NAVIGATION Right 10/7/2024    Procedure: ARTHROPLASTY, KNEE, TOTAL, USING [email or web address] COMPUTER-ASSISTED NAVIGATION: RIGHT: DEPUY - ATTUNE;  Surgeon: Ramses Cardenas III, MD;  Location: [facility];  Service: Orthopedics;  Laterality: Right;    COLONOSCOPY N/A 2/14/2023    Procedure: COLONOSCOPY;  Surgeon: Alessandro Cedeño MD;  Location: [address]);  Service: Endoscopy;  Laterality: N/A;  instr emailed-ml  2/7/23 pre call complete - [email or web address]       Time Tracking:     PT Received On: 10/08/24  PT Start Time: 1039  PT Stop Time: 1125  PT Total Time (min): 46 min     Billable  Minutes: Evaluation 8, Gait Training 16, Therapeutic Activity 9, and Therapeutic Exercise 13    10/8/2024

## 2024-10-08 NOTE — NURSING
Patient arrived from recovery room in bed.  Care plan reviewed with patient and family at bedside. Right knee dressing CDI.  Patient able to move BLE.  Patient instructed to call before getting out of bed.  Patient verbalized understanding. Call light placed within reach.

## 2024-10-08 NOTE — DISCHARGE INSTRUCTIONS
Do not remove surgical dressing for 2 weeks post-op. This will be done only by MD at initial post-op visit.    Patient may shower at home. Dry area around surgical dressing well afterward. No submerging underwater (bathing, swimming, hot tub) for 1 month.     If dressing becomes saturated with drainage or there are signs of infection, please call [facility] [phone] for further instructions and to make appt to be seen.

## 2024-10-08 NOTE — PROGRESS NOTES
Acute Pain Service and Perioperative Surgical Home Progress Note    Interval history  Albertina Sim is a 64 y.o., female,     Rested well overnight.  Eating and drinking with some nausea and one episode of vomiting.  Resolved after compazine.  Ambulating with assistance to restroom - good urine output. Normal strength in lower extremities.  Pain well managed on multimodal regimen.    Surgery:  Procedure(s) (LRB):  ARTHROPLASTY, KNEE, TOTAL, USING [employer] COMPUTER-ASSISTED NAVIGATION: RIGHT: DEPUY - ATTUNE (Right)    Post Op Day #: 1    Problem List:    Active Hospital Problems    Diagnosis  POA    *Primary osteoarthritis of right knee [M17.11]  Yes      Resolved Hospital Problems   No resolved problems to display.       Subjective:       General appearance of alert, oriented, no complaints   Pain with rest: 4    Numbers   Pain with movement: 5    Numbers   Side Effects    1. Pruritis No    2. Nausea Yes    3. Motor Blockade No, 0=Ability to raise lower extremities off bed    4. Sedation Yes, 1=awake and alert    Schedule Medications:    acetaminophen  1,000 mg Oral Q6H    aspirin  81 mg Oral BID    ceFAZolin (Ancef) IV (PEDS and ADULTS)  2 g Intravenous Q8H    famotidine  20 mg Oral BID    methocarbamoL  750 mg Oral TID    multivitamin  1 tablet Oral Daily    mupirocin  1 g Nasal BID    polyethylene glycol  17 g Oral Daily    pregabalin  75 mg Oral QHS    senna-docusate 8.6-50 mg  1 tablet Oral BID    vitamin D  2,000 Units Oral Daily        Continuous Infusions:   0.9% NaCl   Intravenous Continuous 150 mL/hr at 10/08/24 0002 New Bag at 10/08/24 0002        PRN Medications:    Current Facility-Administered Medications:     bisacodyL, 10 mg, Rectal, Q12H PRN    morphine, 2 mg, Intravenous, Q3H PRN    naloxone, 0.02 mg, Intravenous, PRN    ondansetron, 4 mg, Intravenous, Q8H PRN    oxyCODONE, 5 mg, Oral, Q3H PRN    oxyCODONE, 10 mg, Oral, Q3H PRN    prochlorperazine, 5 mg, Intravenous, Q6H PRN    "    Antibiotics:  Antibiotics (From admission, onward)      Start     Stop Route Frequency Ordered    10/07/24 2100  ceFAZolin 2 g in 0.9% NaCl 50 mL IVPB (MB+)  (MEDICATIONS - ANTIBIOTICS NO PENICILLIN ALLERGY TOTAL KNEE PATHWAY POST-OP)         10/08/24 1259 IV Every 8 hours (non-standard times) 10/07/24 1555    10/07/24 2100  mupirocin 2 % ointment 1 g         10/12/24 2059 Nasl 2 times daily 10/07/24 1751               Objective:         Vital Signs (Most Recent):  Temp: 97.1 °F (36.2 °C) (10/08/24 0501)  Pulse: 66 (10/08/24 0501)  Resp: 16 (10/08/24 0501)  BP: 115/67 (10/08/24 0501)  SpO2: 97 % (10/08/24 0501) Vital Signs Range (Last 24H):  Temp:  [96.8 °F (36 °C)-97.8 °F (36.6 °C)]   Pulse:  [56-74]   Resp:  [12-18]   BP: ()/(64-89)   SpO2:  [97 %-100 %]          I & O (Last 24H):  Intake/Output Summary (Last 24 hours) at 10/8/2024 0538  Last data filed at 10/8/2024 0043  Gross per 24 hour   Intake 2120 ml   Output 1450 ml   Net 670 ml       Physical Exam:    GA: Alert, comfortable, no acute distress.   Pulmonary: Clear to auscultation . Normal respiratory ratei.  Cardiac: regular rate and rhythm.          Laboratory: reviewed in chart  CBC: No results for input(s): "WBC", "RBC", "HGB", "HCT", "PLT", "MCV", "MCH", "MCHC" in the last 72 hours.    BMP: No results for input(s): "NA", "K", "CO2", "CL", "BUN", "CREATININE", "GLU", "MG", "PHOS", "CALCIUM" in the last 72 hours.    No results for input(s): "PT", "INR", "PROTIME", "APTT" in the last 72 hours.          Assessment:         Pain control adequate    Plan:  1) Pain: Multimodal pain regimen ordered which includes acetaminophen, celecoxib, pregabalin, and prn oxycodone.  Well controlled on current regimen.  Will continue to monitor.    2) HLD: continue home regimen   3) FEN/GI: Tolerating regular diet.     4) Dispo: Pt working well with PT/OT. Case management and SW following along for setting up home health and physical therapy. Plan to discharge home " this am.              Evaluator Violeta Grijalva PA-C

## 2024-10-08 NOTE — DISCHARGE SUMMARY
[facility])  Orthopedics  Discharge Summary      Patient Name: Albertina Sim  MRN: [id number]  Admission Date: 10/7/2024  Hospital Length of Stay: 0 days  Discharge Date and Time: 10/8/2024 11:50 AM  Attending Physician: Mimi att. providers found   Discharging Provider: Marlon Araiza MD  Primary Care Provider: Angelia Roldan MD    HPI:   Albertina Sim is a 64 y.o. female with history of Right knee pain. Pain is worse with activity and weight bearing.  Patient has experienced interference of activities of daily living due to decreased range of motion and an increase in joint pain and swelling.  Patient has failed non-operative treatment including NSAIDs, corticosteroid injections, viscosupplement injections, and activity modification.  Albertina Sim currently ambulates independently.      Relevant medical conditions of significance in perioperative period:  CKD stage 3- monitored by pcp  Obesity- on wegovy managed by pcp    Procedure(s) (LRB):  ARTHROPLASTY, KNEE, TOTAL, USING [employer] COMPUTER-ASSISTED NAVIGATION: RIGHT: ROMARIO  CICI (Right)      Hospital Course:  On 10/7/24, the patient arrived to the [facility] for proper pre-operative management.  Upon completion of pre-operative preparation, the patient was taken back to the operative theatre. A R TKA was performed without complication and the patient was transported to the post anesthesia care unit in stable condition.  After appropriate recovery from the anaesthetic agents used during the surgery, the patient was then transported to the hospital inpatient floor.  The interim of the hospital stay from arrival on the floor up to discharge has been uncomplicated. The patient has tolerated regular diet.  The patient's pain has been controlled using a multimodal approach. Currently, the patient's pain is well controlled on an oral regimen.  The patient has been voiding without difficulty.  The patient began participation in physical therapy  after surgery and has progressed throughout the entire hospital stay.  Currently, the patient's progress is sufficient to allow the them to be discharged to home safely.  The patient agrees with this assessment and desires a discharge today.      Goals of Care Treatment Preferences:         Consults (From admission, onward)          Status Ordering Provider     Inpatient consult to Social Work  Once        Provider:  (Not yet assigned)    Acknowledged CARLOS BARAHONA     Inpatient consult to Pain Management  Once        Provider:  (Not yet assigned)    Acknowledged CARLOS BARAHONA     Inpatient consult to Respiratory Care  Once        Provider:  (Not yet assigned)    Acknowledged CARLOS BARAHONA            Significant Diagnostic Studies: No pertinent studies.    Pending Diagnostic Studies:       None          Final Active Diagnoses:    Diagnosis Date Noted POA    PRINCIPAL PROBLEM:  Primary osteoarthritis of right knee [M17.11] 07/21/2023 Yes      Problems Resolved During this Admission:      Discharged Condition: good    Disposition: Home or Self Care    Follow Up:    Patient Instructions:      Activity as tolerated     Sponge bath only until clinic visit     Keep surgical extremity elevated     Lifting restrictions   Order Comments: No strenuous exercise or lifting of > 10 lbs     Weight bearing as tolerated     No driving, operating heavy equipment or signing legal documents while taking pain medication     Leave dressing on - Keep it clean, dry, and intact until clinic visit   Order Comments: Do not remove surgical dressing for 2 weeks post-op. This will be done only by MD at initial post-op visit. If dressing is completely saturated, replace with identical dressing - silver-impregnated hydrocolloid dressing.     Do not get dressings wet. Do not shower.     If dressing continues to be saturated or there are signs of infection, please call Ortho Clinic [phone] for further instructions and to make appt  to be seen.     Call MD for:  temperature >100.4     Call MD for:  persistent nausea and vomiting     Call MD for:  severe uncontrolled pain     Call MD for:  difficulty breathing, headache or visual disturbances     Call MD for:  redness, tenderness, or signs of infection (pain, swelling, redness, odor or green/yellow discharge around incision site)     Call MD for:  hives     Call MD for:  persistent dizziness or light-headedness     Call MD for:  extreme fatigue     Medications:  Reconciled Home Medications:      Medication List        CONTINUE taking these medications      acetaminophen 650 MG Tbsr  Commonly known as: TYLENOL  Take 1 tablet (650 mg total) by mouth every 8 (eight) hours.     aspirin 81 MG EC tablet  Commonly known as: ECOTRIN  Take 1 tablet (81 mg total) by mouth 2 (two) times a day.     cefadroxil 500 MG Cap  Commonly known as: DURICEF  Take 1 capsule (500 mg total) by mouth every 12 (twelve) hours. for 7 days     cholecalciferol (vitamin D3) 50 mcg (2,000 unit) Cap capsule  Commonly known as: VITAMIN D3  Take 1 capsule (2,000 Units total) by mouth once daily.     methocarbamoL 750 MG Tab  Commonly known as: ROBAXIN  Take 1 tablet (750 mg total) by mouth 4 (four) times daily as needed (for muscle spasms).     multivitamin per tablet  Commonly known as: THERAGRAN  Take 1 tablet by mouth once daily.     oxyCODONE 5 MG immediate release tablet  Commonly known as: ROXICODONE  Take 1-2 tablets every 4-6 hours as needed for pain     pantoprazole 40 MG tablet  Commonly known as: PROTONIX  Take 1 tablet (40 mg total) by mouth once daily.     SENEXON-S 8.6-50 mg per tablet  Generic drug: senna-docusate 8.6-50 mg  Take 1 tablet by mouth once daily.     WEGOVY 0.5 mg/0.5 mL Pnij  Generic drug: semaglutide (weight loss)  Inject 0.5 mg into the skin once a week.              Marlon Araiza MD  Orthopedics  [facility]

## 2024-10-08 NOTE — PT/OT/SLP EVAL
"Occupational Therapy   Evaluation and Discharge Note    Name: Albertina Sim  MRN: [id number]  Admitting Diagnosis: Primary osteoarthritis of right knee  Recent Surgery: Procedure(s) (LRB):  ARTHROPLASTY, KNEE, TOTAL, USING [employer] COMPUTER-ASSISTED NAVIGATION: RIGHT: DEPUY - ATTUNE (Right) 1 Day Post-Op    Recommendations:     Discharge Recommendations: Low Intensity Therapy  Discharge Equipment Recommendations: shower chair  Barriers to discharge:  None    Assessment:     Albertina Sim is a 64 y.o. female with a medical diagnosis of Primary osteoarthritis of right knee. Pt was willing to participate and tolerated session well overall. She was able to perform ADLs assessed and functional mobility without physical assistance. She demonstrated good functional endurance and safety awareness c/ RW during ambulation to bathroom. At this time, patient is functioning at appropriate independence levels with adequate support at home to be discharged from acute OT services at this time.     Plan:     During this hospitalization, patient does not require further acute OT services.  Please re-consult if situation changes.    Plan of Care Reviewed with: patient (brother in-law)    Subjective     Chief Complaint: none stated  Patient/Family Comments/goals: "I'll be staying with my brother in-law when I go home."    Occupational Profile:  Living Environment: 2SH, 3 LYUDMILA c/ RHR; t/s, standard toilet; able to live in guest room on first floor; lives alone  Previous level of function: indep, drives  Roles and Routines: retired  Equipment Used at home: bedside commode, walker, rolling  Assistance upon Discharge: brother in-law; pt will be staying at his house: SSH, ramp entrance in back, WIS c/ grab bars and shower chair    Pain/Comfort:  Pain Rating 1: 0/10  Location - Side 1: Right  Location 1: knee  Pain Rating Post-Intervention 1: 0/10    Patients cultural, spiritual, [employer]ybeterian conflicts given the current situation: no    Objective: "     Communicated with: RN prior to session.  Patient found supine with cryotherapy, FCD upon OT entry to room.    General Precautions: Standard, fall  Orthopedic Precautions: RLE weight bearing as tolerated  Braces: N/A  Respiratory Status: Room air     Occupational Performance:    Bed Mobility:    Patient completed Rolling/Turning to Right with stand by assistance  Patient completed Scooting/Bridging with stand by assistance  Patient completed Supine to Sit with stand by assistance    Functional Mobility/Transfers:  Patient completed Sit <> Stand Transfer with stand by assistance  with  rolling walker   Patient completed Toilet Transfer Step Transfer technique with supervision with  rolling walker  Chair t/f: SBA c/ RW  Functional Mobility: from bed to bathroom to chair; SBA c/ RW; no LOB    Activities of Daily Living:  Grooming: supervision /setup; pt brushed teeth, washed face standing at sink  Upper Body Dressing: supervision /setup; pt donned dress sitting in chair  Lower Body Dressing: supervision /setup; pt donned socks and underwear sitting in chair  Toileting: pt not needing to void at this time      Cognitive/Visual Perceptual:  Cognitive/Psychosocial Skills:     -       Oriented to: Person, Place, Time, and Situation   -       Follows Commands/attention:Follows multistep  commands  -       Safety awareness/insight to disability: intact     Physical Exam:  Balance:    -       static standing balance: close SPV  Upper Extremity Range of Motion:     -       Right Upper Extremity: WFL  -       Left Upper Extremity: WFL  Upper Extremity Strength:    -       Right Upper Extremity: WFL  -       Left Upper Extremity: WFL   Strength:    -       Right Upper Extremity: WFL  -       Left Upper Extremity: WFL  Fine Motor Coordination:    -       Intact  Left hand, manipulation of objects and Right hand, manipulation of objects    AMPAC 6 Click ADL:  AMPAC Total Score: 23    Treatment & Education:  Pt edu on role  of OT, POC, safety when performing self care tasks , benefit of performing OOB activity, and safety when performing functional transfers and mobility.  - White board updated  - Self care tasks completed-- as noted above    - pt educated on proper sequencing for LB dressing  - pt educated on proper sequencing for getting into/out of vehicle  - pt educated on WB status    Patient left up in chair with all lines intact, call button in reach, RN notified, and brother in law present      History:     Past Medical History:   Diagnosis Date    Arthritis     Disorder of kidney and ureter     Hyperlipidemia     Sickle cell trait     Vitamin D deficiency          Past Surgical History:   Procedure Laterality Date    COLONOSCOPY N/A 2/14/2023    Procedure: COLONOSCOPY;  Surgeon: Alessandro Cedeño MD;  Location: [facility] ([address] [place] [address]);  Service: Endoscopy;  Laterality: N/A;  instr emailed-ml  2/7/23 pre call complete - [email or web address]       Time Tracking:     OT Date of Treatment: 10/08/24  OT Start Time: 0932  OT Stop Time: 1000  OT Total Time (min): 28 min    Billable Minutes:Evaluation 10  Self Care/Home Management 18    10/8/2024

## 2024-10-08 NOTE — PLAN OF CARE
Plan of care reviewed with patient; verbalized understanding.   Medications reviewed and administered as ordered.  Rounding for safety and patient care per policy.   Safety precautions maintained. Patient working appropriately with PT/OT.  DME at bedside.  Call light within reach, bed wheels locked, bed in lowest position, side rails ^x2, safety maintained. NADN, Will continue monitor.      Problem: Adult Inpatient Plan of Care  Goal: Absence of Hospital-Acquired Illness or Injury  Outcome: Progressing  Intervention: Identify and Manage Fall Risk  Flowsheets (Taken 10/8/2024 0206)  Safety Promotion/Fall Prevention:   assistive device/personal item within reach   Fall Risk reviewed with patient/family   instructed to call staff for mobility   medications reviewed   lighting adjusted   nonskid shoes/socks when out of bed   side rails raised x 2  Intervention: Prevent and Manage VTE (Venous Thromboembolism) Risk  Flowsheets (Taken 10/8/2024 0206)  VTE Prevention/Management:   dorsiflexion/plantar flexion performed   fluids promoted   remove, assess skin, and reapply foot pump   intravenous hydration  Intervention: Prevent Infection  Flowsheets (Taken 10/8/2024 0206)  Infection Prevention:   rest/sleep promoted   single patient room provided   hand hygiene promoted

## 2024-10-08 NOTE — PLAN OF CARE
Problem: Adult Inpatient Plan of Care  Goal: Plan of Care Review  Outcome: Adequate for Care Transition  Flowsheets (Taken 10/8/2024 0913)  Plan of Care Reviewed With:   patient   spouse     Problem: Adult Inpatient Plan of Care  Goal: Patient-Specific Goal (Individualized)  Outcome: Adequate for Care Transition  Flowsheets (Taken 10/8/2024 0913)  Individualized Care Needs: pain management     Problem: Knee Arthroplasty  Goal: Absence of Bleeding  Intervention: Monitor and Manage Bleeding  Flowsheets (Taken 10/8/2024 0913)  Bleeding Management: dressing monitored     Problem: Knee Arthroplasty  Goal: Nausea and Vomiting Relief  Intervention: Prevent or Manage Nausea and Vomiting  Flowsheets (Taken 10/8/2024 0913)  Nausea/Vomiting Interventions:   nausea triggers minimized   stimuli minimized     Problem: Fall Injury Risk  Goal: Absence of Fall and Fall-Related Injury  Intervention: Identify and Manage Contributors  Flowsheets (Taken 10/8/2024 0913)  Medication Review/Management:   medications reviewed   high-risk medications identified     Problem: Fall Injury Risk  Goal: Absence of Fall and Fall-Related Injury  Intervention: Promote Injury-Free Environment  Flowsheets (Taken 10/8/2024 0913)  Safety Promotion/Fall Prevention:   assistive device/personal item within reach   Fall Risk reviewed with patient/family   family expresses understanding of fall risk and prevention   high risk medications identified   instructed to call staff for mobility   lighting adjusted   medications reviewed   nonskid shoes/socks when out of bed   patient expresses understanding of fall risk and prevention   side rails raised x 2     Problem: Chronic Kidney Disease  Goal: Electrolyte Balance  Intervention: Monitor and Manage Electrolyte Imbalance  Flowsheets (Taken 10/8/2024 0913)  Fluid/Electrolyte Management: fluids provided     Plan of care reviewed with patient, verbalized understanding. Medications reviewed and given as ordered.  Rounding for safety and patient care per policy. Safety precautions maintained. Call light within reach, bed wheels locked, bed in lowest position, side rails up x2, patient instructed to call for assistance, DME at bedside.

## 2024-10-08 NOTE — SUBJECTIVE & OBJECTIVE
"Principal Problem:Primary osteoarthritis of right knee    Principal Orthopedic Problem: Same    Interval History: NIMOEON, patient stable, pain controlled. No acute complaints this morning. , Some nausea and emesis last night but improved this AM.  NO PT yesterday due to late surgery.       Review of patient's allergies indicates:   Allergen Reactions    No known drug allergies        Current Facility-Administered Medications   Medication    0.9%  NaCl infusion    acetaminophen tablet 1,000 mg    aspirin EC tablet 81 mg    bisacodyL suppository 10 mg    famotidine tablet 20 mg    methocarbamoL tablet 750 mg    morphine injection 2 mg    multivitamin tablet    mupirocin 2 % ointment 1 g    naloxone 0.4 mg/mL injection 0.02 mg    ondansetron injection 4 mg    oxyCODONE immediate release tablet 5 mg    oxyCODONE immediate release tablet Tab 10 mg    polyethylene glycol packet 17 g    pregabalin capsule 75 mg    prochlorperazine injection Soln 5 mg    senna-docusate 8.6-50 mg per tablet 1 tablet    vitamin D 1000 units tablet 2,000 Units     Objective:     Vital Signs (Most Recent):  Temp: 97.1 °F (36.2 °C) (10/08/24 0501)  Pulse: 66 (10/08/24 0501)  Resp: 16 (10/08/24 0501)  BP: 115/67 (10/08/24 0501)  SpO2: 97 % (10/08/24 0501) Vital Signs (24h Range):  Temp:  [96.8 °F (36 °C)-97.8 °F (36.6 °C)] 97.1 °F (36.2 °C)  Pulse:  [56-74] 66  Resp:  [12-18] 16  SpO2:  [97 %-100 %] 97 %  BP: ()/(64-89) 115/67     Weight: 103.9 kg (229 lb)  Height: 5' 6" (167.6 cm)  Body mass index is 36.96 kg/m².      Intake/Output Summary (Last 24 hours) at 10/8/2024 0614  Last data filed at 10/8/2024 0043  Gross per 24 hour   Intake 2120 ml   Output 1450 ml   Net 670 ml        Ortho/SPM Exam  AAOx4  NAD  Reg rate  No increased WOB    RLE:  Dressing c/d/i  Ice in place  SILT T/SP/DP/Arambula/Sa  Motor intact T/SP/DP  WWP extremities  FCDs in place and functioning       Significant Labs:   Recent Lab Results       None          All " pertinent labs within the past 24 hours have been reviewed.    Significant Imaging: I have reviewed and interpreted all pertinent imaging results/findings.

## 2024-10-09 ENCOUNTER — OFFICE VISIT (OUTPATIENT)
Dept: ORTHOPEDICS | Facility: CLINIC | Age: 64
End: 2024-10-09
Payer: COMMERCIAL

## 2024-10-09 DIAGNOSIS — Z96.651 S/P TOTAL KNEE REPLACEMENT, RIGHT: Primary | ICD-10-CM

## 2024-10-09 PROCEDURE — 99024 POSTOP FOLLOW-UP VISIT: CPT | Mod: 95,,,

## 2024-10-09 PROCEDURE — 3044F HG A1C LEVEL LT 7.0%: CPT | Mod: CPTII,95,,

## 2024-10-09 PROCEDURE — 1159F MED LIST DOCD IN RCRD: CPT | Mod: CPTII,95,,

## 2024-10-09 PROCEDURE — 1160F RVW MEDS BY RX/DR IN RCRD: CPT | Mod: CPTII,95,,

## 2024-10-10 NOTE — PROGRESS NOTES
I called the patient today regarding her  surgery with Dr. Ramses Cardenas III. The patient had a right TKA on 10/7/24.     Pain Scale: 4 / 10    Any issues with Fever: No.    Any issues with medications (specifically DVT prophylaxis): No.    Pain medication: yes; oxycodone 5 mg  Celebrex : n/a  Protonix : yes  Resume home meds : Yes    Any issues with:   Bowel movements: Yes:  no BM yet  Passing mray: No.  Urination: No.    Completing at home exercises: Yes    Any concerns regarding their dressing/bandage:  No.    Patient confirmed first HH-PT appointment: on  10/9/24 at 4:00PM.     Any other concerns: No.    Blue Bracelet : yes    The patient was informed that if they have any urgent issues with their bandage, medications or any other health concerns regarding their surgery to call the 24/7 Orthopedic Post-op Hot Line at [phone]. The patient was reminded that if they have any chest pain or shortness of breath to call 911 or go to the ER.    The patient verbalized understanding and does not have any other questions

## 2024-10-21 NOTE — PROGRESS NOTES
[facility]  Physical Therapy Initial Evaluation    Name: Albertina Sim  Clinic Number: [id number]    Therapy Diagnosis:   Encounter Diagnoses   Name Primary?    Impaired functional mobility, balance, gait, and endurance Yes    Decreased range of motion of right knee     Muscle weakness of lower extremity      Physician: Ramses Cardenas III, *    Physician Orders: PT Eval and Treat   Medical Diagnosis from Referral: M17.11 (ICD-10-CM) - Primary osteoarthritis of right knee   Evaluation Date: 10/22/2024  Surgery Date: 10/7/2024  Authorization Period Expiration: 12/31/2024  Plan of Care Expiration: 11/19/2024  Visit # / Visits authorized: 1/ 20    Time In: 0800  Time Out: 0900  Total Billable Time: 30 minutes    Precautions: Standard    Subjective     Date of onset: Date of surgery: 10/07/2024    History of current condition - Albertina reports: she had a right TKA on 10/07/2024. Reports her leg is still very swollen. Patient reports Home Health Physical Therapy was seeing her. Initially seen twice in the first wee and 3 times last week. States she has her post op visit later today       Past Medical History:   Diagnosis Date    Arthritis     Disorder of kidney and ureter     Hyperlipidemia     Sickle cell trait     Vitamin D deficiency      Albertina Sim  has a past surgical history that includes Colonoscopy (N/A, 2/14/2023) and arthroplasty, knee, total, using computer-assisted navigation (Right, 10/7/2024).    Albertina has a current medication list which includes the following prescription(s): acetaminophen, aspirin, cholecalciferol (vitamin d3), methocarbamol, multivitamin, oxycodone, pantoprazole, senna-docusate 8.6-50 mg, and wegovy.    Review of patient's allergies indicates:   Allergen Reactions    No known drug allergies         Imaging: 10/7/2024  X-ray  FINDINGS:  Status post interval recent right knee total arthroplasty demonstrating anatomic positioning and alignment.  No acute  displaced fracture-dislocation identified.  Scattered subcutaneous gas about the knee with soft tissue swelling likely related to recent surgery.  Otherwise no change.  Refer to operative/procedure report for further details.    Prior Therapy: yes, injections, prehab, Home Health Physical Therapy   Social History: single lives alone. Raised house, 3 steps to enter. 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 Getting back [place]. Get out more.    Objective     Observation:     Knee Right Left Pain/Dysfunction with Movement   AROM/PROM      flexion  70/80  125/130 Pain in right    extension  9/7  2/4 Hyperextension on left      *denotes pain    At end of session right knee active range of motion extension full extension in long sitting.  In sitting, right knee active range of motion flexion 80 degrees, passive range of motion 85 degrees      LE MMT Right Left Pain/Dysfunction with Movement   (approx 4 sec hold w/ max contraction)   Hip Flexion  5/5  5/5    Hip Abduction  3-/5  3-/5     Knee Extension 5/5  5/5 Pain in right     Knee Flexion  5/5  5/5 Pain in right        Flexibility testing:  - hamstrings:           bilateral: within normal limits   - gastrocnemius:     bilateral: tight, right: neutral, left: -5 degrees       TU seconds (with rolling walker)    30 second sit-to-stand test (without U/E support): 7 repetitions     5X Sit to Stand: 20 seconds  Norms:   11.4 seconds for 60-69  "years age groups  12.6 seconds for 70 - 79 years old  14.8 seconds for  80-89 years old    Gait Analysis: with rolling walker: forward flexed at trunk, leaning to the right, good heel to toe gait    Impairment/Limitation/Restriction for KOOS, Functional, Daily Living    Therapist reviewed KOOS scores for lAbertina Sim on 10/22/2024.   KOOS documents entered into [employer] - see Media section.    Limitation Score: 61.6%    Limitation for FOTO Knee Survey  Limitation Score: 54%         TREATMENT     Treatment Time In: 0830  Treatment Time Out: 0900  Total Treatment time separate from Evaluation: 30 minutes    Albertina received therapeutic exercises to develop strength, ROM, and flexibility for 2 minutes including:  Gastrocnemius stretches with towel 3 x 30"      Albertina participated in neuromuscular re-education activities to improve: muscle activation, muscle sequencing, and muscle strengthening. for 28 minutes. The following activities were included:    [x]+quadriceps sets in long sitting 20 x 5"  [x]+heel slides on knee glide x 5'    [x]+quadriceps sets performed with electrical stimulation  received NMES Electrical Stimulation to elicit muscle contraction of the right quadriceps. Pt received stimulation at a rate of 50 pps with Russian current, ramp of 2 seconds with 10 second on time and 10 second off time. Patient tolerated treatment well without any adverse effects.        Home Exercises and Patient Education Provided    Education provided:   - Discussed the role of the PTA on the Rehab Team. Discussed patient will be seen by a physical therapist minimally every 6th visit or every 30 days prior to being seen by PTA. Also discussed the use of the [facility] for communication.    Long sitting calf stretch with towel   Long sitting quadriceps   Heel prop  Sitting heel slides    Written Home Exercises Provided: yes.  Exercises were reviewed and Albertina was able to demonstrate them prior to the end of the session.  " Albertina demonstrated good  understanding of the education provided.     See Electronic Medical Record under Patient Instructions for exercises provided 10/22/2024.    Assessment     Albertina is a 64 y.o. female referred to outpatient Physical Therapy with a medical diagnosis of M17.11 (ICD-10-CM) - Primary osteoarthritis of right knee . Pt presents to PT with pain, decreased right knee range of motion, decreased strength, poor posture, impaired balance and gait, and functional deficits with walking. Pt would benefit from skilled physical therapy consisting of manual therapy including soft tissue mobilization/myofascial release right  knee, patellar mobs, knee flex/ext mobs/stretching; therapeutic exercise including LE strengthening/stretching, postural education, balance and gait training, and modalities prn to address limitations and increase functional mobility.      Patient presenting on evaluation today with decreased range of motion in right knee, weak right quadriceps activation, muscle weakness, and impaired gait. Utilized electrical stimulation for right quad activation. Will benefit from continued neuromuscular reeducation, manual therapy, gait training, and bilateral lower extremity strengthening and stretching to progress to goals.      Pt prognosis is Good.   Pt will benefit from skilled outpatient Physical Therapy to address the deficits stated above and in the chart below, provide pt/family education, and to maximize pt's level of independence.     Plan of care discussed with patient: Yes  Pt's spiritual, cultural and educational needs considered and patient is agreeable to the plan of care and goals as stated below:     Anticipated Barriers for therapy: none     Medical Necessity is demonstrated by the following  History  Co-morbidities and personal factors that may impact the plan of care [] LOW: no personal factors / co-morbidities  [] MODERATE: 1-2 personal factors / co-morbidities  [x] HIGH: 3+  personal factors / co-morbidities     Moderate / High Support Documentation:   Co-morbidities affecting plan of care: hyperlipidemia, Sickle Cell anemia, arthritis     Personal Factors:   no deficits      Examination  Body Structures and Functions, activity limitations and participation restrictions that may impact the plan of care [] LOW: addressing 1-2 elements  [] MODERATE: 3+ elements  [x] HIGH: 4+ elements (please support below)     Moderate / High Support Documentation: muscle weakness, decreased knee range of motion, impaired gait, limited exercise and activities of daily living       Clinical Presentation [] LOW: stable  [x] MODERATE: Evolving  [] HIGH: Unstable      Decision Making/ Complexity Score: moderate              Goals:  Short Term Goals: 2 weeks  1. Pt will be independent with home exercise program supplement physical therapy in improving functional mobility.  2. Pt will improve right lower extremity strength to at least 75% of left  lower extremity strength as measured via [employer] handheld dynamometer in order to improve functional mobility  3. Pt will improve right  knee active range of motion to at least 0 to 100 degrees in order to improve gait    Long Term Goals: 4 weeks  1. Pt will be independent with updated home exercise program supplement physical therapy in improving functional mobility.  2. Pt will improve lower extremity manual muscle test  to 5/5 bilateral in order to improve functional mobility  3. Pt will improve right  knee active range of motion to at least 0 to 120 degrees in order to improve gait and ability to perform ADLs  4. Pt will improve Focus On Therapeutic Outcomes knee survey score to </= 31% limited in order to demo improved functional mobility  5. Pt will perform Timed Up and Go in < 10 seconds without AD in order to demo improved gait speed  6. Pt will perform at least 15 sit to stands without UE support on 30 second sit to stand test in order to demo improved  "ability to perform transfers      TUG Cutoff Scores:        30" sit to stand Cutoff Scores:          Plan     Plan of care Certification: 10/22/2024 to 11/19/2024.    Outpatient Physical Therapy 3 times weekly for 2 weeks 2 times a week for 2 weeks to include the following interventions: Gait Training, Manual Therapy, Moist Heat/ Ice, Neuromuscular Re-ed, Orthotic Management and Training, Patient Education, Therapeutic Activites and Therapeutic Exercise, ASTYM, Kinesiotape    Parminder Pineda, PT, DPT     "

## 2024-10-22 ENCOUNTER — CLINICAL SUPPORT (OUTPATIENT)
Dept: REHABILITATION | Facility: OTHER | Age: 64
End: 2024-10-22
Attending: ORTHOPAEDIC SURGERY
Payer: COMMERCIAL

## 2024-10-22 ENCOUNTER — OFFICE VISIT (OUTPATIENT)
Dept: ORTHOPEDICS | Facility: CLINIC | Age: 64
End: 2024-10-22
Payer: COMMERCIAL

## 2024-10-22 DIAGNOSIS — Z74.09 IMPAIRED FUNCTIONAL MOBILITY, BALANCE, GAIT, AND ENDURANCE: Primary | ICD-10-CM

## 2024-10-22 DIAGNOSIS — M62.81 MUSCLE WEAKNESS OF LOWER EXTREMITY: ICD-10-CM

## 2024-10-22 DIAGNOSIS — M25.661 DECREASED RANGE OF MOTION OF RIGHT KNEE: ICD-10-CM

## 2024-10-22 DIAGNOSIS — Z96.651 S/P TOTAL KNEE REPLACEMENT, RIGHT: Primary | ICD-10-CM

## 2024-10-22 PROCEDURE — 1101F PT FALLS ASSESS-DOCD LE1/YR: CPT | Mod: CPTII,S$GLB,, | Performed by: NURSE PRACTITIONER

## 2024-10-22 PROCEDURE — 1159F MED LIST DOCD IN RCRD: CPT | Mod: CPTII,S$GLB,, | Performed by: NURSE PRACTITIONER

## 2024-10-22 PROCEDURE — 99024 POSTOP FOLLOW-UP VISIT: CPT | Mod: S$GLB,,, | Performed by: NURSE PRACTITIONER

## 2024-10-22 PROCEDURE — 3288F FALL RISK ASSESSMENT DOCD: CPT | Mod: CPTII,S$GLB,, | Performed by: NURSE PRACTITIONER

## 2024-10-22 PROCEDURE — 3044F HG A1C LEVEL LT 7.0%: CPT | Mod: CPTII,S$GLB,, | Performed by: NURSE PRACTITIONER

## 2024-10-22 PROCEDURE — 99999 PR PBB SHADOW E&M-EST. PATIENT-LVL III: CPT | Mod: PBBFAC,,, | Performed by: NURSE PRACTITIONER

## 2024-10-22 PROCEDURE — 1160F RVW MEDS BY RX/DR IN RCRD: CPT | Mod: CPTII,S$GLB,, | Performed by: NURSE PRACTITIONER

## 2024-10-22 PROCEDURE — 97162 PT EVAL MOD COMPLEX 30 MIN: CPT | Mod: PN

## 2024-10-22 PROCEDURE — 1125F AMNT PAIN NOTED PAIN PRSNT: CPT | Mod: CPTII,S$GLB,, | Performed by: NURSE PRACTITIONER

## 2024-10-22 PROCEDURE — 97112 NEUROMUSCULAR REEDUCATION: CPT | Mod: PN

## 2024-10-22 NOTE — PROGRESS NOTES
Albertina Sim presents for initial post-operative visit following a right total knee arthroplasty performed by Dr. Cardenas on 10/7/2024.      Exam:     Ambulating well with assistive device.  Incision is clean and dry without drainage or erythema.   ROM:5-90    Initial post-operative radiographs reviewed today revealing a well fixed and aligned prosthesis.    A/P:  2 weeks s/p right total knee replacement    - The patient was advised to keep the incision clean and dry for the next 24 hours after which she may wash the area with antibacterial soap in the shower. Will not submerge incision until one month post op.  - Outpatient PT: ongoing at the [facility] location  - Continue aspirin for 1 month post op.  - Pain medication refill not needed  - Reviewed antibiotic prophylaxis   - Follow up in 4 weeks with surgeon. Pt will call clinic with problems/concerns.

## 2024-10-22 NOTE — PATIENT INSTRUCTIONS
CALF STRETCH WITH TOWEL - GASTROCNEMIUS        While in a seated position, place a towel around the ball of your foot and pull your ankle back until a stretch is felt on your calf area.    Keep your knee in a straightened position during the stretch.    Hold for 30 seconds. Perform 3 repetitions    Also perform your thigh squeezes (quad sets) in this position.    Hold for 5 seconds, Perform 20 repetitions     Copyright © 2024 [employer]    Heel Prop        Sit with leg propped (using a large towel, books, rollers, etc), relax letting the leg straighten into extension.    *Can also assist by placing your hand just above the knee and gently pressing down toward the floor.    Start for 5 minutes and work up to 10 minutes    Copyright © 2024 [employer]    SEATED HEEL SLIDES        While in a seated position and foot forward and rested on the floor, slowly slide your foot closer towards you.     Hold a gentle stretch and then return foot forward to original position.      Perform 30 repetitions    Copyright © 2024 [employer]

## 2024-10-22 NOTE — PROGRESS NOTES
[facility]   Physical Therapy Treatment Note     Name: Albertina Sim  Clinic Number: [id number]    Therapy Diagnosis:   Encounter Diagnoses   Name Primary?    Impaired functional mobility, balance, gait, and endurance Yes    Muscle weakness of lower extremity      Physician: Ramses Cardenas III, *    Visit Date: 10/23/2024  Physician Orders: PT Eval and Treat   Medical Diagnosis from Referral: M17.11 (ICD-10-CM) - Primary osteoarthritis of right knee   Evaluation Date: 10/22/2024  Surgery Date: 10/7/2024  Authorization Period Expiration: 12/31/2024  Plan of Care Expiration: 11/19/2024  Visit # / Visits authorized: 2/ 20  Focus On Therapeutic Outcomes Tool: 2/ 5, 1. 10/23/2024   PTA Visit #: 0/5     Time In: 0800  Time Out: 0900  Total Billable Time: 60 minutes    Precautions: Standard    Subjective   Patient reports: she went to the post op follow up yesterday and had some stiffness later in the day.    She was compliant with home exercise program.  Response to previous treatment: some stiffness  Functional change: no change reported today    Pain: 4/10  Location: knee  right    OBJECTIVE     Objective Measures updated at progress report unless specified.     Treatment     Below therapeutic interventions were performed under the supervision of therapist and physical therapy technician.      Albertina received therapeutic exercises to develop strength, endurance, ROM, and flexibility for 6 minutes including:  [x]+recumbent bike rowing x 6' (seat 12)    Albertina received the following manual therapy techniques: Joint mobilizations were applied to the: right knee for 5 minutes, including:  [x]Grades I and II femoral and tibial mobilizations     Albertina participated in neuromuscular re-education activities to improve: muscle activation, muscle sequencing, and muscle strengthening for 31 minutes. The following activities were included:    [x]+heel prop x 5'  [x]+short arc quadriceps 20  "repetitions  [x]+straight leg raises 2 x 10 repetitions   [x]+long arc quadriceps 20 repetitions   [x]+side lying hip abduction 2 x 10 repetitions    [x]+standing terminal knee extensions with green band 20 x 5"    [x] quadriceps sets in long sitting 20 x 5"  [x] heel slides on knee glide x 5'     [] quadriceps sets performed with electrical stimulation  received NMES Electrical Stimulation to elicit muscle contraction of the right quadriceps. Pt received stimulation at a rate of 50 pps with Russian current, ramp of 2 seconds with 10 second on time and 10 second off time. Patient tolerated treatment well without any adverse effects.         Albertina participated in dynamic functional therapeutic activities to improve functional performance for 15 minutes, including:  [x]+sit to stand with pad on chair 2 x 10 repetitions   [x]+reverse walking in // bars x 3'  [x]+forward step outs with contralateral arm swing x 20 repetitions in // bars  [x]+lateral step outs with arms abducted x 20 repetitions each direction in // bars    Albertina participated in gait training to improve functional mobility and safety for 3  minutes, including:  [x]+instructed patient in 3 point and 2 point gait pattern with cane      Albertina received cold pack for 00 minutes to right knee.      Home Exercises Provided and Patient Education Provided     Education provided:   -instructed patient in 2 point and 3 point gait pattern with the cane    Written Home Exercises Provided: Patient instructed to cont prior HEP.  Exercises were reviewed and Albertina was able to demonstrate them prior to the end of the session.  Albertina demonstrated good  understanding of the education provided.     See Electronic Medical Record under Patient Instructions for exercises provided prior visit.    Assessment     Improving right knee active extension. Improved quadriceps activation, but still weak, particularly with straight leg raises.    Albertina Is progressing well towards " her goals.   Patient prognosis is Good.     Patient will continue to benefit from skilled outpatient physical therapy to address the deficits listed in the problem list box on initial evaluation, provide pt/family education and to maximize pt's level of independence in the home and community environment.     Patient's spiritual, cultural and educational needs considered and pt agreeable to plan of care and goals.    Anticipated barriers to physical therapy: none    Goals:   Short Term Goals: 2 weeks  1. Pt will be independent with home exercise program supplement physical therapy in improving functional mobility. (In progress)  2. Pt will improve right lower extremity strength to at least 75% of left  lower extremity strength as measured via [employer] handheld dynamometer in order to improve functional mobility. (In progress)  3. Pt will improve right  knee active range of motion to at least 0 to 100 degrees in order to improve gait. (In progress)     Long Term Goals: 4 weeks  1. Pt will be independent with updated home exercise program supplement physical therapy in improving functional mobility. (In progress)  2. Pt will improve lower extremity manual muscle test  to 5/5 bilateral in order to improve functional mobility.  (In progress)  3. Pt will improve right  knee active range of motion to at least 0 to 120 degrees in order to improve gait and ability to perform activities of daily living.(In progress)   4. Pt will improve Focus On Therapeutic Outcomes knee survey score to </= 31% limited in order to demo improved functional mobility.   5. Pt will perform Timed Up and Go in < 10 seconds without AD in order to demo improved gait speed. (In progress)  6. Pt will perform at least 15 sit to stands without UE support on 30 second sit to stand test in order to demo improved ability to perform transfers. (In progress)      Plan     Continue with gait training, lower extremity stretching and strengthening, and balance  training.    Parminder Pineda, PT

## 2024-10-23 ENCOUNTER — CLINICAL SUPPORT (OUTPATIENT)
Dept: REHABILITATION | Facility: OTHER | Age: 64
End: 2024-10-23
Payer: COMMERCIAL

## 2024-10-23 DIAGNOSIS — Z74.09 IMPAIRED FUNCTIONAL MOBILITY, BALANCE, GAIT, AND ENDURANCE: Primary | ICD-10-CM

## 2024-10-23 DIAGNOSIS — M62.81 MUSCLE WEAKNESS OF LOWER EXTREMITY: ICD-10-CM

## 2024-10-23 PROCEDURE — 97530 THERAPEUTIC ACTIVITIES: CPT | Mod: PN

## 2024-10-23 PROCEDURE — 97112 NEUROMUSCULAR REEDUCATION: CPT | Mod: PN

## 2024-10-24 NOTE — PROGRESS NOTES
"[facility]   Physical Therapy Treatment Note     Name: Albertina Sim  Clinic Number: [id number]    Therapy Diagnosis:   Encounter Diagnoses   Name Primary?    Impaired functional mobility, balance, gait, and endurance Yes    Muscle weakness of lower extremity      Physician: Ramses Cardenas III, *    Visit Date: 10/25/2024  Physician Orders: PT Eval and Treat   Medical Diagnosis from Referral: M17.11 (ICD-10-CM) - Primary osteoarthritis of right knee   Evaluation Date: 10/22/2024  Surgery Date: 10/7/2024  Authorization Period Expiration: 12/31/2024  Plan of Care Expiration: 11/19/2024  Reassessment Due: 11/23/2024  Visit # / Visits authorized: 3 (2/ 20)  Focus On Therapeutic Outcomes Tool: 3/ 5; 1 (Last issued on 10/23/2024)     PTA Visit #: 1/ 5     Time In: 8:10 am  Time Out: 9:13 am  Total Billable Time: 59 minutes    Precautions: Standard    Subjective   Patient reports: Started feeling some numbness in the front of the knee and still has stiffness    She was compliant with home exercise program.  Response to previous treatment: "I left feeling great, but after an hour it stiffened up"  Functional change: None today    Pain: 6/10  Location: Right knee    OBJECTIVE     Objective Measures updated at progress report unless specified.     Treatment         Albertina received therapeutic exercises to develop strength, endurance, ROM, and flexibility for 10 minutes including:  [x] Recumbent bike (Seat 13) x 8 min (Half revolutions)  [x] +Gastroc stretch on incline 3 x 30"    Albertina received the following manual therapy techniques: Joint mobilizations were applied to the: right knee for 00 minutes, including:  [] Grades I and II femoral and tibial mobilizations       Albertina participated in neuromuscular re-education activities to improve: muscle activation, muscle sequencing, and muscle strengthening for 41 minutes. The following activities were included:  [x] Heel prop on half foam x 4 min on " "right  [x] Heel slides on knee glide x 5 min on right  [x] Quadriceps sets in long sitting 20 x 5" on right  [x] Short arc quadriceps x 20 on right   [x] Straight leg raises 2 x 10 on right  [x] Long arc quadriceps x 20 on right   [x] Side lying hip abduction 2 x 10   [x] Standing terminal knee extensions with orange sport band 20 x 5"        [] Quadriceps sets performed with electrical stimulation  received NMES Electrical Stimulation to elicit muscle contraction of the right quadriceps. Pt received stimulation at a rate of 50 pps with Russian current, ramp of 2 seconds with 10 second on time and 10 second off time. Patient tolerated treatment well without any adverse effects.         Albertina participated in dynamic functional therapeutic activities to improve functional performance for 8 minutes, including:  [x] Sit to stand with foam on chair 2 x 10   [x] Reverse walking in // bars x 4 laps  (Try treadmill next visit)  [] Forward step outs with contralateral arm swing x 20 repetitions in // bars  [] Lateral step outs with arms abducted x 20 repetitions each direction in // bars      Albertina participated in gait training to improve functional mobility and safety for 00 minutes, including:  [] Instructed patient in 3 point and 2 point gait pattern with cane      Albertina received cold pack for 00 minutes to right knee.      Home Exercises Provided and Patient Education Provided     Education provided:   - Continuance of HEP    Written Home Exercises Provided: Patient instructed to cont prior HEP.  Exercises were reviewed and Albertina was able to demonstrate them prior to the end of the session.  Albertina demonstrated good  understanding of the education provided.     See Electronic Medical Record under Patient Instructions for exercises provided prior visit.    Assessment   Patient tolerated treatment well today with minimal quad lag during straight leg raises, but overall good quad activation. Patient not quite able to " achieve full revolutions on bike today. Will continue to work towards improving knee range of motion and lower extremity strength as tolerated.     Albertina Is progressing well towards her goals.   Patient prognosis is Good.     Patient will continue to benefit from skilled outpatient physical therapy to address the deficits listed in the problem list box on initial evaluation, provide pt/family education and to maximize pt's level of independence in the home and community environment.     Patient's spiritual, cultural and educational needs considered and pt agreeable to plan of care and goals.    Anticipated barriers to physical therapy: none    Goals:   Short Term Goals: 2 weeks  1. Pt will be independent with home exercise program supplement physical therapy in improving functional mobility. (In progress)  2. Pt will improve right lower extremity strength to at least 75% of left  lower extremity strength as measured via MicroFet handheld dynamometer in order to improve functional mobility. (In progress)  3. Pt will improve right  knee active range of motion to at least 0 to 100 degrees in order to improve gait. (In progress)     Long Term Goals: 4 weeks  1. Pt will be independent with updated home exercise program supplement physical therapy in improving functional mobility. (In progress)  2. Pt will improve lower extremity manual muscle test  to 5/5 bilateral in order to improve functional mobility.  (In progress)  3. Pt will improve right  knee active range of motion to at least 0 to 120 degrees in order to improve gait and ability to perform activities of daily living.(In progress)   4. Pt will improve Focus On Therapeutic Outcomes knee survey score to </= 31% limited in order to demo improved functional mobility.   5. Pt will perform Timed Up and Go in < 10 seconds without AD in order to demo improved gait speed. (In progress)  6. Pt will perform at least 15 sit to stands without UE support on 30 second sit to  stand test in order to demo improved ability to perform transfers. (In progress)    Plan     Continue with gait training, lower extremity stretching and strengthening, and balance training.    Maritza Smiley III, PTA

## 2024-10-25 ENCOUNTER — DOCUMENTATION ONLY (OUTPATIENT)
Dept: REHABILITATION | Facility: OTHER | Age: 64
End: 2024-10-25

## 2024-10-25 ENCOUNTER — CLINICAL SUPPORT (OUTPATIENT)
Dept: REHABILITATION | Facility: OTHER | Age: 64
End: 2024-10-25
Payer: COMMERCIAL

## 2024-10-25 DIAGNOSIS — Z74.09 IMPAIRED FUNCTIONAL MOBILITY, BALANCE, GAIT, AND ENDURANCE: Primary | ICD-10-CM

## 2024-10-25 DIAGNOSIS — M62.81 MUSCLE WEAKNESS OF LOWER EXTREMITY: ICD-10-CM

## 2024-10-25 PROCEDURE — 97112 NEUROMUSCULAR REEDUCATION: CPT | Mod: PN,CQ

## 2024-10-25 PROCEDURE — 97110 THERAPEUTIC EXERCISES: CPT | Mod: PN,CQ

## 2024-10-25 PROCEDURE — 97530 THERAPEUTIC ACTIVITIES: CPT | Mod: PN,CQ

## 2024-10-25 NOTE — PROGRESS NOTES
PT/PTA met face to face to discuss pt's treatment plan and progress towards established goals. Pt will be seen by a physical therapist minimally every 6th visit or every 30 days.    Maritza Smiley III, PTA    Meeting as noted above.     Parminder Pineda, PT, CWS, Cert DN

## 2024-10-28 ENCOUNTER — CLINICAL SUPPORT (OUTPATIENT)
Dept: REHABILITATION | Facility: OTHER | Age: 64
End: 2024-10-28
Payer: COMMERCIAL

## 2024-10-28 DIAGNOSIS — M62.81 MUSCLE WEAKNESS OF LOWER EXTREMITY: ICD-10-CM

## 2024-10-28 DIAGNOSIS — Z74.09 IMPAIRED FUNCTIONAL MOBILITY, BALANCE, GAIT, AND ENDURANCE: Primary | ICD-10-CM

## 2024-10-28 PROCEDURE — 97110 THERAPEUTIC EXERCISES: CPT | Mod: PN

## 2024-10-28 PROCEDURE — 97112 NEUROMUSCULAR REEDUCATION: CPT | Mod: PN

## 2024-10-28 PROCEDURE — 97530 THERAPEUTIC ACTIVITIES: CPT | Mod: PN

## 2024-10-30 ENCOUNTER — CLINICAL SUPPORT (OUTPATIENT)
Dept: REHABILITATION | Facility: OTHER | Age: 64
End: 2024-10-30
Payer: COMMERCIAL

## 2024-10-30 DIAGNOSIS — M62.81 MUSCLE WEAKNESS OF LOWER EXTREMITY: ICD-10-CM

## 2024-10-30 DIAGNOSIS — Z74.09 IMPAIRED FUNCTIONAL MOBILITY, BALANCE, GAIT, AND ENDURANCE: Primary | ICD-10-CM

## 2024-10-30 PROCEDURE — 97110 THERAPEUTIC EXERCISES: CPT | Mod: PN,CQ

## 2024-10-30 PROCEDURE — 97112 NEUROMUSCULAR REEDUCATION: CPT | Mod: PN,CQ

## 2024-10-30 PROCEDURE — 97530 THERAPEUTIC ACTIVITIES: CPT | Mod: PN,CQ

## 2024-11-01 ENCOUNTER — CLINICAL SUPPORT (OUTPATIENT)
Dept: REHABILITATION | Facility: OTHER | Age: 64
End: 2024-11-01
Payer: COMMERCIAL

## 2024-11-01 ENCOUNTER — EXTERNAL HOME HEALTH (OUTPATIENT)
Dept: HOME HEALTH SERVICES | Facility: HOSPITAL | Age: 64
End: 2024-11-01
Payer: COMMERCIAL

## 2024-11-01 DIAGNOSIS — M62.81 MUSCLE WEAKNESS OF LOWER EXTREMITY: ICD-10-CM

## 2024-11-01 DIAGNOSIS — Z74.09 IMPAIRED FUNCTIONAL MOBILITY, BALANCE, GAIT, AND ENDURANCE: Primary | ICD-10-CM

## 2024-11-01 PROCEDURE — 97530 THERAPEUTIC ACTIVITIES: CPT | Mod: PN,CQ

## 2024-11-01 PROCEDURE — 97112 NEUROMUSCULAR REEDUCATION: CPT | Mod: PN,CQ

## 2024-11-01 PROCEDURE — 97110 THERAPEUTIC EXERCISES: CPT | Mod: PN,CQ

## 2024-11-01 NOTE — PROGRESS NOTES
[facility]   Physical Therapy Treatment Note     Name: Albertina Sim  [facility] Number: [id number]    Therapy Diagnosis:   Encounter Diagnoses   Name Primary?    Impaired functional mobility, balance, gait, and endurance Yes    Muscle weakness of lower extremity        Physician: Ramses Cardenas III, *    Visit Date: 11/4/2024  Physician Orders: PT Eval and Treat   Medical Diagnosis from Referral: M17.11 (ICD-10-CM) - Primary osteoarthritis of right knee   Evaluation Date: 10/22/2024  Surgery Date: 10/7/2024  Authorization Period Expiration: 12/31/2024  Plan of Care Expiration: 11/19/2024  Reassessment Due: 11/23/2024  Visit # / Visits authorized: 7 (5/ 20)  Focus On Therapeutic Outcomes Tool: 2/ 5; 2 (Last issued on 11/1/2024)     PTA Visit #: 0/ 5     Time In: 0800  Time Out: 0857  Total Billable Time: 57 minutes    Precautions: Standard    Subjective   Patient reports: walking is fine, but bending is painful. Felt good after last session, but experienced stiffness over the weekend.    She was compliant with home exercise program.  Response to previous treatment: felt really good.  Functional change: walking is better. Still brings the cane with her, but not using cane in her home.    Pain: 5/10  Location: Right knee    OBJECTIVE   11/4/2024  30 seconds Sit to Stand:  9    Timed Up and Go: 11 seconds without the cane    Right knee active range of motion: 0 -  95  degrees (start of session)  Right knee active range of motion: 0  - 100  degrees (end of session), 105 degrees flexion passive range of motion     11/1/2024:   Right knee active range of motion: 0 - 97 degrees (end of session)     10/28/2024:   30 seconds Sit to Stand: 9     Timed Up and Go: 14 seconds with cane    Right knee active range of motion: 0 - 80 degrees (start of session)  Right knee active range of motion: 0 - 95 degrees (end of session)    Treatment   Below therapeutic interventions were performed under the supervision  "of therapist and physical therapy technician.      Albertina received therapeutic exercises to develop strength, endurance, ROM, and flexibility for 12 minutes including:  [x] Recumbent bike (Seat 13) x 8 min   (Full revolutions by the end)  [x] Gastroc stretch on incline 3 x 30"  [x] Prone quadriceps stretch with strap x 2 min    Albertina received the following manual therapy techniques: Joint mobilizations were applied to the: right knee for 00 minutes, including:  [] Grades I and II femoral and tibial mobilizations       Albertina participated in neuromuscular re-education activities to improve: muscle activation, muscle sequencing, and muscle strengthening for 30 minutes. The following activities were included:  [] Heel prop on half foam x 4 min on right   [x] Heel slides on knee glide x 5 min on right   [x] Quadriceps sets in long sitting 30 x 5" on right   [] Short arc quadriceps x 20 on right   [x] Straight leg raises 2 x 10 on right   [x] Long arc quadriceps with 2# x 20 on right   [x] Seated hamstring curl with Yellow Theraband x 20 on right  [x] Side lying hip abduction 2 x 10   [] Standing terminal knee extensions with orange sport band 20 x 5"     [x]+double leg shuttle 2 x 10 repetitions with 50#  [x]+single leg shuttle 2 x 10 repetitions with 25#       [] Quadriceps sets performed with electrical stimulation  received NMES Electrical Stimulation to elicit muscle contraction of the right quadriceps. Pt received stimulation at a rate of 50 pps with Russian current, ramp of 2 seconds with 10 second on time and 10 second off time. Patient tolerated treatment well without any adverse effects.         Albertina participated in dynamic functional therapeutic activities to improve functional performance for 15 minutes, including:  [] Focus On Therapeutic Outcomes   [] Sit to stand with foam on chair 2 x 10   [x] Sit to stand from chair x 15  [x] Reverse walking on treadmill x 4 min  (0.6 speed)   [] Forward step outs " with contralateral arm swing x 20 repetitions in // bars   [] Lateral step outs with arms abducted x 20 repetitions each direction in // bars  [x] Lateral stepping 2 x 30ft  [x] Hesitation march 2 x 30ft   [x] Weekly reassessment        Albertina received cold pack for 00 minutes to right knee.      Home Exercises Provided and Patient Education Provided     Education provided:   - Continuance of home exercise program     Written Home Exercises Provided: Patient instructed to cont prior HEP.  Exercises were reviewed and Albertina was able to demonstrate them prior to the end of the session.  Albertina demonstrated good  understanding of the education provided.     See Electronic Medical Record under Patient Instructions for exercises provided prior visit.    Assessment   Patient still experiencing stiffness in right knee. IMproving right knee active range of motion flexion. Added leg presses today.    Albertina Is progressing well towards her goals.   Patient prognosis is Good.     Patient will continue to benefit from skilled outpatient physical therapy to address the deficits listed in the problem list box on initial evaluation, provide pt/family education and to maximize pt's level of independence in the home and community environment.     Patient's spiritual, cultural and educational needs considered and pt agreeable to plan of care and goals.    Anticipated barriers to physical therapy: none    Goals:   Short Term Goals: 2 weeks  1. Pt will be independent with home exercise program supplement physical therapy in improving functional mobility. (In progress)  2. Pt will improve right lower extremity strength to at least 75% of left  lower extremity strength as measured via MicroFet handheld dynamometer in order to improve functional mobility. (In progress)  3. Pt will improve right  knee active range of motion to at least 0 to 100 degrees in order to improve gait. (In progress)     Long Term Goals: 4 weeks  1. Pt will be  independent with updated home exercise program supplement physical therapy in improving functional mobility. (In progress)  2. Pt will improve lower extremity manual muscle test  to 5/5 bilateral in order to improve functional mobility.  (In progress)  3. Pt will improve right  knee active range of motion to at least 0 to 120 degrees in order to improve gait and ability to perform activities of daily living.(In progress)   4. Pt will improve Focus On Therapeutic Outcomes knee survey score to </= 31% limited in order to demo improved functional mobility.   5. Pt will perform Timed Up and Go in < 10 seconds without AD in order to demo improved gait speed. (In progress)  6. Pt will perform at least 15 sit to stands without UE support on 30 second sit to stand test in order to demo improved ability to perform transfers. (In progress)    Plan     Continue with gait training, lower extremity stretching and strengthening, and balance training.    Parminder Pineda, PT

## 2024-11-04 ENCOUNTER — CLINICAL SUPPORT (OUTPATIENT)
Dept: REHABILITATION | Facility: OTHER | Age: 64
End: 2024-11-04
Payer: COMMERCIAL

## 2024-11-04 DIAGNOSIS — Z74.09 IMPAIRED FUNCTIONAL MOBILITY, BALANCE, GAIT, AND ENDURANCE: Primary | ICD-10-CM

## 2024-11-04 DIAGNOSIS — M62.81 MUSCLE WEAKNESS OF LOWER EXTREMITY: ICD-10-CM

## 2024-11-04 PROCEDURE — 97112 NEUROMUSCULAR REEDUCATION: CPT | Mod: PN

## 2024-11-04 PROCEDURE — 97530 THERAPEUTIC ACTIVITIES: CPT | Mod: PN

## 2024-11-04 PROCEDURE — 97110 THERAPEUTIC EXERCISES: CPT | Mod: PN

## 2024-11-05 NOTE — PROGRESS NOTES
[facility]   Physical Therapy Treatment Note     Name: Albertina Sim  [facility] Number: [id number]    Therapy Diagnosis:   Encounter Diagnoses   Name Primary?    Impaired functional mobility, balance, gait, and endurance Yes    Muscle weakness of lower extremity        Physician: Ramses Cardenas III, *    Visit Date: 11/6/2024  Physician Orders: PT Eval and Treat   Medical Diagnosis from Referral: M17.11 (ICD-10-CM) - Primary osteoarthritis of right knee   Evaluation Date: 10/22/2024  Surgery Date: 10/7/2024  Authorization Period Expiration: 12/31/2024  Plan of Care Expiration: 11/19/2024  Reassessment Due: 11/23/2024  Visit # / Visits authorized: 8 (7/ 20)  Focus On Therapeutic Outcomes Tool: 3/ 5; 2 (Last issued on 11/1/2024)     PTA Visit #: 0/ 5     Time In: 8:04 am  Time Out: 9:10 am  Total Billable Time: 55 minutes    Precautions: Standard    Subjective   Patient reports: she is stiff this morning but overall feeling okay.     She was compliant with home exercise program.  Response to previous treatment: felt really good.  Functional change: walking is better. Still brings the cane with her, but not using cane in her home.    Pain: 3/10  Location: Right knee    OBJECTIVE   11/4/2024  30 seconds Sit to Stand:  9    Timed Up and Go: 11 seconds without the cane    Right knee active range of motion: 0 -  95  degrees (start of session)  Right knee active range of motion: 0  - 100  degrees (end of session), 105 degrees flexion passive range of motion     11/1/2024:   Right knee active range of motion: 0 - 97 degrees (end of session)     10/28/2024:   30 seconds Sit to Stand: 9     Timed Up and Go: 14 seconds with cane    Right knee active range of motion: 0 - 80 degrees (start of session)  Right knee active range of motion: 0 - 95 degrees (end of session)    Treatment   Below therapeutic interventions were performed under the supervision of therapist and physical therapy  "technician.      Albertina received therapeutic exercises to develop strength, endurance, ROM, and flexibility for 12 minutes including:  [x] Recumbent bike (Seat 13) x 8 min   (Full revolutions by the end)  [x] Gastroc stretch on incline 2x 30"  [x] Prone quadriceps stretch with strap x 2 min    Albertina received the following manual therapy techniques: Joint mobilizations were applied to the: right knee for 00 minutes, including:  [] Grades I and II femoral and tibial mobilizations       Albertina participated in neuromuscular re-education activities to improve: muscle activation, muscle sequencing, and muscle strengthening for 30 minutes. The following activities were included:  [] Heel prop on half foam x 4 min on right   [x] Heel slides on knee glide x 5 min on right   [x] Quadriceps sets in long sitting 10x 10 seconds on right   [x] Short arc quadriceps with 2# weight and eccentric lowering x20 on right   [x] Straight leg raises 2 x 10 on right   [x] Long arc quadriceps with 2# x 20 on right   [] Seated hamstring curl with Yellow Theraband x 20 on right  [] Side lying hip abduction 2 x 10   [x] Standing terminal knee extensions with ball at wall 20 x 5"   [x] + double leg heel raises x20     [x] double leg shuttle 3x 10 repetitions with 50#  [x] single leg shuttle 3x 10 repetitions with 25#       Albertina participated in dynamic functional therapeutic activities to improve functional performance for 15 minutes, including:  [] Focus On Therapeutic Outcomes   [] Sit to stand with foam on chair 2 x 10   [x] Sit to stand from chair 2x 15  [x] Reverse walking on treadmill x 4 min  (0.6 speed)   [] Forward step outs with contralateral arm swing x 20 repetitions in // bars   [] Lateral step outs with arms abducted x 20 repetitions each direction in // bars  [x] Lateral stepping with green (light) loop around knees 2 x 30ft each   [x] Hesitation march 2 x 30ft   [] Weekly reassessment        Albertina received cold pack for 00 " minutes to right knee.      Home Exercises Provided and Patient Education Provided     Education provided:   - Continuance of home exercise program     Written Home Exercises Provided: Patient instructed to cont prior HEP.  Exercises were reviewed and Albertina was able to demonstrate them prior to the end of the session.  Albertina demonstrated good  understanding of the education provided.     See Electronic Medical Record under Patient Instructions for exercises provided prior visit.    Assessment   Continued with quadriceps activation as well as knee range of motion. Able to achieve full revolutions on recumbent bike after rocking warm up. Consider progressing weight at next visit for shuttle press.    Albertina Is progressing well towards her goals.   Patient prognosis is Good.     Patient will continue to benefit from skilled outpatient physical therapy to address the deficits listed in the problem list box on initial evaluation, provide patient/family education and to maximize patient's level of independence in the home and community environment.     Patient's spiritual, cultural and educational needs considered and patient agreeable to plan of care and goals.    Anticipated barriers to physical therapy: none    Goals:   Short Term Goals: 2 weeks  1. Pt will be independent with home exercise program supplement physical therapy in improving functional mobility. (In progress)  2. Pt will improve right lower extremity strength to at least 75% of left  lower extremity strength as measured via MicroFet handheld dynamometer in order to improve functional mobility. (In progress)  3. Pt will improve right  knee active range of motion to at least 0 to 100 degrees in order to improve gait. (In progress)     Long Term Goals: 4 weeks  1. Pt will be independent with updated home exercise program supplement physical therapy in improving functional mobility. (In progress)  2. Pt will improve lower extremity manual muscle test  to  5/5 bilateral in order to improve functional mobility.  (In progress)  3. Pt will improve right  knee active range of motion to at least 0 to 120 degrees in order to improve gait and ability to perform activities of daily living.(In progress)   4. Pt will improve Focus On Therapeutic Outcomes knee survey score to </= 31% limited in order to demo improved functional mobility.   5. Pt will perform Timed Up and Go in < 10 seconds without AD in order to demo improved gait speed. (In progress)  6. Pt will perform at least 15 sit to stands without UE support on 30 second sit to stand test in order to demo improved ability to perform transfers. (In progress)    Plan     Continue with gait training, lower extremity stretching and strengthening, and balance training.    Madalyn Leonard, PT

## 2024-11-06 ENCOUNTER — CLINICAL SUPPORT (OUTPATIENT)
Dept: REHABILITATION | Facility: OTHER | Age: 64
End: 2024-11-06
Payer: COMMERCIAL

## 2024-11-06 DIAGNOSIS — Z96.651 S/P TOTAL KNEE REPLACEMENT, RIGHT: ICD-10-CM

## 2024-11-06 DIAGNOSIS — M62.81 MUSCLE WEAKNESS OF LOWER EXTREMITY: ICD-10-CM

## 2024-11-06 DIAGNOSIS — Z74.09 IMPAIRED FUNCTIONAL MOBILITY, BALANCE, GAIT, AND ENDURANCE: Primary | ICD-10-CM

## 2024-11-06 PROCEDURE — 97110 THERAPEUTIC EXERCISES: CPT | Mod: PN

## 2024-11-06 PROCEDURE — 97112 NEUROMUSCULAR REEDUCATION: CPT | Mod: PN

## 2024-11-06 PROCEDURE — 97530 THERAPEUTIC ACTIVITIES: CPT | Mod: PN

## 2024-11-06 RX ORDER — OXYCODONE HYDROCHLORIDE 5 MG/1
TABLET ORAL
Qty: 40 TABLET | Refills: 0 | Status: SHIPPED | OUTPATIENT
Start: 2024-11-06

## 2024-11-11 ENCOUNTER — CLINICAL SUPPORT (OUTPATIENT)
Dept: REHABILITATION | Facility: OTHER | Age: 64
End: 2024-11-11
Payer: COMMERCIAL

## 2024-11-11 ENCOUNTER — PATIENT MESSAGE (OUTPATIENT)
Dept: ORTHOPEDICS | Facility: CLINIC | Age: 64
End: 2024-11-11
Payer: COMMERCIAL

## 2024-11-11 DIAGNOSIS — Z74.09 IMPAIRED FUNCTIONAL MOBILITY, BALANCE, GAIT, AND ENDURANCE: Primary | ICD-10-CM

## 2024-11-11 DIAGNOSIS — M62.81 MUSCLE WEAKNESS OF LOWER EXTREMITY: ICD-10-CM

## 2024-11-11 PROCEDURE — 97530 THERAPEUTIC ACTIVITIES: CPT | Mod: PN

## 2024-11-11 PROCEDURE — 97112 NEUROMUSCULAR REEDUCATION: CPT | Mod: PN

## 2024-11-11 NOTE — PROGRESS NOTES
[facility]   Physical Therapy Treatment Note     Name: Albertina Sim  [facility] Number: [id number]    Therapy Diagnosis:   Encounter Diagnoses   Name Primary?    Impaired functional mobility, balance, gait, and endurance Yes    Muscle weakness of lower extremity        Physician: Ramses Cardenas III, *    Visit Date: 11/11/2024  Physician Orders: PT Eval and Treat   Medical Diagnosis from Referral: M17.11 (ICD-10-CM) - Primary osteoarthritis of right knee   Evaluation Date: 10/22/2024  Surgery Date: 10/7/2024  Authorization Period Expiration: 12/31/2024  Plan of Care Expiration: 11/19/2024  Reassessment Due: 11/19/2024  Visit # / Visits authorized: 10 (9/ 20)  Focus On Therapeutic Outcomes Tool: 4/ 5; 2 (Last issued on 11/1/2024)     PTA Visit #: 0/ 5     Time In: 0755  Time Out: 0859  Total Billable Time: 64 minutes    Precautions: Standard    Subjective   Patient reports: she is stiff this morning but overall feeling okay.     She was compliant with home exercise program.  Response to previous treatment: felt really good.  Functional change: walking is better. Still brings the cane with her, but not using cane in her home.    Pain: 3/10  Location: Right knee    OBJECTIVE   11/11/24  30 seconds Sit to Stand:  11  repetitions     Timed Up and Go: 11  seconds without the cane    Right knee active range of motion: 0 -  100  degrees (start of session)  Right knee active range of motion: 0  - 105 degrees (end of session), 110 degrees flexion passive range of motion       11/4/2024  30 seconds Sit to Stand:  9 repetitions     Timed Up and Go: 11 seconds without the cane    Right knee active range of motion: 0 -  95  degrees (start of session)  Right knee active range of motion: 0  - 100  degrees (end of session), 105 degrees flexion passive range of motion     11/1/2024:   Right knee active range of motion: 0 - 97 degrees (end of session)     10/28/2024:   30 seconds Sit to Stand: 9     Timed Up  "and Go: 14 seconds with cane    Right knee active range of motion: 0 - 80 degrees (start of session)  Right knee active range of motion: 0 - 95 degrees (end of session)    Treatment   Below therapeutic interventions were performed under the supervision of therapist and physical therapy technician.      Albertina received therapeutic exercises to develop strength, endurance, ROM, and flexibility for 7 minutes including:  [x] Recumbent bike (Seat 12) x 6 min     [x] Gastroc stretch on incline 2x 30"  [] Prone quadriceps stretch with strap x 2 min    Albertina received the following manual therapy techniques: Joint mobilizations were applied to the: right knee for 00 minutes, including:  [] Grades I and II femoral and tibial mobilizations       Albertina participated in neuromuscular re-education activities to improve: muscle activation, muscle sequencing, and muscle strengthening for 32 minutes. The following activities were included:  [] Heel prop on half foam x 4 min on right   [] Heel slides on knee glide x 5 min on right   [x] Quadriceps sets in long sitting 15 x 5"  [x] Short arc quadriceps with 2# weight and eccentric lowering x 20 on right   [x] Straight leg raises 2 x 10 on right   [x] Long arc quadriceps with 2# x 20 on right   [] Seated hamstring curl with Yellow Theraband x 20 on right  [x] Side lying hip abduction 2 x 10 with 1#  []+prone hamstring curls with 1# 2 x 10 repetitions     [x] Standing terminal knee extensions with thick green monster band 30 x 5"   [x] double leg heel raises x 20     [x] double leg shuttle 3x 10 repetitions with 75#  [x] single leg shuttle 3x 10 repetitions with 50#    [x] Seated tibial internal rotation/external rotation x 20 repetitions        Albertina participated in dynamic functional therapeutic activities to improve functional performance for 25 minutes, including:  [] Focus On Therapeutic Outcomes   [] Sit to stand with foam on chair 2 x 10   [x] Sit to stand from chair 2 x " "15  [x] Reverse walking on treadmill x 5 min  (0.6 speed)   [] Forward step outs with contralateral arm swing x 20 repetitions in // bars   [] Lateral step outs with arms abducted x 20 repetitions each direction in // bars  [x] Lateral stepping with green (light) loop around knees 2 x 30ft each   [x] Hesitation march 2 x 30ft   [x] Weekly reassessment    [x]+forward step ups 4" 2 x 10 repetitions   [x]+forward step downs 2" 2 x 10 repetitions       Albertina received cold pack for 00 minutes to right knee.      Home Exercises Provided and Patient Education Provided     Education provided:   - Continuance of home exercise program     Written Home Exercises Provided: Patient instructed to cont prior HEP.  Exercises were reviewed and Albertina was able to demonstrate them prior to the end of the session.  Albertina demonstrated good  understanding of the education provided.     See Electronic Medical Record under Patient Instructions for exercises provided prior visit.    Assessment   Continues to progress with right knee flexion. Good training effect with exercises today. Good strong right quadriceps activation noted with quadriceps sets today in long sitting.     Albertina Is progressing well towards her goals.   Patient prognosis is Good.     Patient will continue to benefit from skilled outpatient physical therapy to address the deficits listed in the problem list box on initial evaluation, provide patient/family education and to maximize patient's level of independence in the home and community environment.     Patient's spiritual, cultural and educational needs considered and patient agreeable to plan of care and goals.    Anticipated barriers to physical therapy: none    Goals:   Short Term Goals: 2 weeks  1. Pt will be independent with home exercise program supplement physical therapy in improving functional mobility. (ongoing)  2. Pt will improve right lower extremity strength to at least 75% of left  lower extremity " strength as measured via MicroFet handheld dynamometer in order to improve functional mobility. (In progress)  3. Pt will improve right  knee active range of motion to at least 0 to 100 degrees in order to improve gait. (met)     Long Term Goals: 4 weeks  1. Pt will be independent with updated home exercise program supplement physical therapy in improving functional mobility. (ongoing)  2. Pt will improve lower extremity manual muscle test  to 5/5 bilateral in order to improve functional mobility.  (In progress)  3. Pt will improve right  knee active range of motion to at least 0 to 120 degrees in order to improve gait and ability to perform activities of daily living.(In progress)   4. Pt will improve Focus On Therapeutic Outcomes knee survey score to </= 31% limited in order to demo improved functional mobility.   5. Pt will perform Timed Up and Go in < 10 seconds without AD in order to demo improved gait speed. (In progress)  6. Pt will perform at least 15 sit to stands without UE support on 30 second sit to stand test in order to demo improved ability to perform transfers. (In progress)    Plan     Continue with gait training, lower extremity stretching and strengthening, and balance training.    Parminder Pineda, PT

## 2024-11-12 NOTE — PROGRESS NOTES
[facility]   Physical Therapy Treatment Note     Name: Albertina Sim  [facility] Number: [id number]    Therapy Diagnosis:   Encounter Diagnoses   Name Primary?    Impaired functional mobility, balance, gait, and endurance Yes    Muscle weakness of lower extremity        Physician: Ramses Cardenas III, *    Visit Date: 11/13/2024  Physician Orders: PT Eval and Treat   Medical Diagnosis from Referral: M17.11 (ICD-10-CM) - Primary osteoarthritis of right knee   Evaluation Date: 10/22/2024  Surgery Date: 10/7/2024  Authorization Period Expiration: 12/31/2024  Plan of Care Expiration: 11/19/2024  Reassessment Due: 11/19/2024  Visit # / Visits authorized: 11 (10/ 20)  Focus On Therapeutic Outcomes Tool: 5/ 5; 2 (Last issued on 11/1/2024)     PTA Visit #: 0/ 5     Time In: 0800  Time Out: 0900  Total Billable Time: 60 minutes    Precautions: Standard    Subjective   Patient reports: the stiffness is better. Denied pain. States the numbness is a smaller area of the right lateral knee    She was compliant with home exercise program.  Response to previous treatment: did fine  Functional change: able to sleep on her side    Pain: 3/10  Location: Right knee    OBJECTIVE   11/11/24  30 seconds Sit to Stand:  11  repetitions     Timed Up and Go: 11  seconds without the cane    Right knee active range of motion: 0 -  100  degrees (start of session)  Right knee active range of motion: 0  - 105 degrees (end of session), 110 degrees flexion passive range of motion       11/4/2024  30 seconds Sit to Stand:  9 repetitions     Timed Up and Go: 11 seconds without the cane    Right knee active range of motion: 0 -  95  degrees (start of session)  Right knee active range of motion: 0  - 100  degrees (end of session), 105 degrees flexion passive range of motion     11/1/2024:   Right knee active range of motion: 0 - 97 degrees (end of session)     10/28/2024:   30 seconds Sit to Stand: 9     Timed Up and Go: 14  "seconds with cane    Right knee active range of motion: 0 - 80 degrees (start of session)  Right knee active range of motion: 0 - 95 degrees (end of session)    Treatment     Below therapeutic interventions were performed under the supervision of therapist and physical therapy technician.      Albertina received therapeutic exercises to develop strength, endurance, ROM, and flexibility for 7 minutes including:  [x] Recumbent bike (Seat 12) x 6 min     [x] Gastroc stretch on incline 2 x 30"  [] Prone quadriceps stretch with strap x 2 min    Albertina received the following manual therapy techniques: Joint mobilizations were applied to the: right knee for 00 minutes, including:  [] Grades I and II femoral and tibial mobilizations       Albertina participated in neuromuscular re-education activities to improve: muscle activation, muscle sequencing, and muscle strengthening for 32 minutes. The following activities were included:  [] Heel prop on half foam x 4 min on right   [] Heel slides on knee glide x 5 min on right   [x] Quadriceps sets in long sitting 15 x 5"  [x] Short arc quadriceps with 2# weight and eccentric lowering x 20 on right   [x] Straight leg raises 2 x 10 on right   [x] Long arc quadriceps with 2# x 20 on right   [] Seated hamstring curl with Yellow Theraband x 20 on right  [x] Side lying hip abduction 2 x 10 with 1#  [x] prone hamstring curls with 1# 2 x 10 repetitions     [x] Standing terminal knee extensions with thick green monster band 30 x 5"   [x] double leg heel raises x 20     [x] double leg shuttle 3x 10 repetitions with 75#  [x] single leg shuttle 3x 10 repetitions with 50#    [x] Seated tibial internal rotation/external rotation x 20 repetitions with thin yellow monster band       Albertina participated in dynamic functional therapeutic activities to improve functional performance for 21 minutes, including:  [] Focus On Therapeutic Outcomes   [] Sit to stand with foam on chair 2 x 10   [x] Sit to stand " "from chair 2 x 15  [x] Reverse walking on treadmill x 5 min  (0.6 speed)   [] Forward step outs with contralateral arm swing x 20 repetitions in // bars   [] Lateral step outs with arms abducted x 20 repetitions each direction in // bars  [x] Lateral stepping with green (light) loop around knees 2 x 30ft each   [x] Hesitation march 2 x 30ft   [] Weekly reassessment    [x] forward step ups 4" 2 x 10 repetitions   [x] forward step downs 2" 2 x 10 repetitions   [x]+right knee isometric extension at 60 degrees 10" x 10 repetitions     Albertina received cold pack for 00 minutes to right knee.      Home Exercises Provided and Patient Education Provided     Education provided:   - Continuance of home exercise program     Written Home Exercises Provided: Patient instructed to cont prior home exercise program .  Exercises were reviewed and Albertina was able to demonstrate them prior to the end of the session.  Albertina demonstrated good  understanding of the education provided.     See Electronic Medical Record under Patient Instructions for exercises provided prior visit.    Assessment   Added seated isometric knee extension today for quadriceps activation and strengthening.    Albertina Is progressing well towards her goals.   Patient prognosis is Good.     Patient will continue to benefit from skilled outpatient physical therapy to address the deficits listed in the problem list box on initial evaluation, provide patient/family education and to maximize patient's level of independence in the home and community environment.     Patient's spiritual, cultural and educational needs considered and patient agreeable to plan of care and goals.    Anticipated barriers to physical therapy: none    Goals:   Short Term Goals: 2 weeks  1. Pt will be independent with home exercise program supplement physical therapy in improving functional mobility. (ongoing)  2. Pt will improve right lower extremity strength to at least 75% of left  lower " extremity strength as measured via MicroFet handheld dynamometer in order to improve functional mobility. (In progress)  3. Pt will improve right  knee active range of motion to at least 0 to 100 degrees in order to improve gait. (met)     Long Term Goals: 4 weeks  1. Pt will be independent with updated home exercise program supplement physical therapy in improving functional mobility. (ongoing)  2. Pt will improve lower extremity manual muscle test  to 5/5 bilateral in order to improve functional mobility.  (In progress)  3. Pt will improve right  knee active range of motion to at least 0 to 120 degrees in order to improve gait and ability to perform activities of daily living.(In progress)   4. Pt will improve Focus On Therapeutic Outcomes knee survey score to </= 31% limited in order to demo improved functional mobility.   5. Pt will perform Timed Up and Go in < 10 seconds without AD in order to demo improved gait speed. (In progress)  6. Pt will perform at least 15 sit to stands without UE support on 30 second sit to stand test in order to demo improved ability to perform transfers. (In progress)    Plan     Continue with gait training, lower extremity stretching and strengthening, and balance training.    Parminder Pineda, PT

## 2024-11-13 ENCOUNTER — CLINICAL SUPPORT (OUTPATIENT)
Dept: REHABILITATION | Facility: OTHER | Age: 64
End: 2024-11-13
Payer: COMMERCIAL

## 2024-11-13 DIAGNOSIS — M62.81 MUSCLE WEAKNESS OF LOWER EXTREMITY: ICD-10-CM

## 2024-11-13 DIAGNOSIS — Z74.09 IMPAIRED FUNCTIONAL MOBILITY, BALANCE, GAIT, AND ENDURANCE: Primary | ICD-10-CM

## 2024-11-13 PROCEDURE — 97112 NEUROMUSCULAR REEDUCATION: CPT | Mod: PN

## 2024-11-13 PROCEDURE — 97530 THERAPEUTIC ACTIVITIES: CPT | Mod: PN

## 2024-11-15 NOTE — PROGRESS NOTES
[facility]   Physical Therapy Treatment Note     Name: Albertina Sim  [facility] Number: [id number]    Therapy Diagnosis:   Encounter Diagnoses   Name Primary?    Impaired functional mobility, balance, gait, and endurance Yes    Muscle weakness of lower extremity        Physician: Ramses Cardenas III, *    Visit Date: 11/18/2024  Physician Orders: PT Eval and Treat   Medical Diagnosis from Referral: M17.11 (ICD-10-CM) - Primary osteoarthritis of right knee   Evaluation Date: 10/22/2024  Surgery Date: 10/7/2024  Authorization Period Expiration: 12/31/2024  Plan of Care Expiration: 11/19/2024  Reassessment Due: 11/19/2024  Visit # / Visits authorized: 11 (10/ 20)  Focus On Therapeutic Outcomes Tool: 6/ 5; 2 (Last issued on 11/1/2024)     PTA Visit #: 1/ 5     Time In: 8:03 am  Time Out: 8:59 am  Total Billable Time: 56 minutes    Precautions: Standard    Subjective   Patient reports: Started having a little more pain than normal in distal knee a day or two after last visit    She was compliant with home exercise program.  Response to previous treatment: Felt fine  Functional change: None today    Pain: 4-5/10  Location: Right knee    OBJECTIVE     11/11/2024  30 seconds Sit to Stand:  11  repetitions     Timed Up and Go: 11  seconds without the cane    Right knee active range of motion: 0 -  100  degrees (start of session)  Right knee active range of motion: 0  - 105 degrees (end of session), 110 degrees flexion passive range of motion       11/4/2024  30 seconds Sit to Stand:  9 repetitions     Timed Up and Go: 11 seconds without the cane    Right knee active range of motion: 0 -  95  degrees (start of session)  Right knee active range of motion: 0  - 100  degrees (end of session), 105 degrees flexion passive range of motion     11/1/2024:   Right knee active range of motion: 0 - 97 degrees (end of session)     10/28/2024:   30 seconds Sit to Stand: 9     Timed Up and Go: 14 seconds with  "cane    Right knee active range of motion: 0 - 80 degrees (start of session)  Right knee active range of motion: 0 - 95 degrees (end of session)    Treatment           Albertina received therapeutic exercises to develop strength, endurance, ROM, and flexibility for 9 minutes including:  [x] Recumbent bike (Seat 12) x 8 min     [x] Gastroc stretch on incline 2 x 30"  [] Prone quadriceps stretch with strap x 2 min    Albertina received the following manual therapy techniques: Joint mobilizations were applied to the: right knee for 00 minutes, including:  [] Grades I and II femoral and tibial mobilizations       Albertina participated in neuromuscular re-education activities to improve: muscle activation, muscle sequencing, and muscle strengthening for 35 minutes. The following activities were included:  [] Heel prop on half foam x 4 min on right   [] Heel slides on knee glide x 5 min on right   [x] Quadriceps sets in long sitting 15 x 5"  [x] Short arc quadriceps with 2# weight and eccentric lowering x 20 on right   [x] Straight leg raises with 1#, 2 x 10 on right   [x] Long arc quadriceps with 2#, x 20 on right   [x] Seated hamstring curl with Yellow Theraband x 20 on right  [x] Side lying hip abduction with 1#, 2 x 10   [x] Prone hamstring curls with 1#,  2 x 10 repetitions     [x] Standing terminal knee extensions with thick green monster band 30 x 5"   [x] Double leg heel raises x 20     [x] Double leg shuttle with 75#, 3 x 10   [x] Single leg shuttle with 50#, 3 x 10     [x] Seated tibial internal rotation/external rotation x 20 repetitions with thin yellow monster band       Albertina participated in dynamic functional therapeutic activities to improve functional performance for 12 minutes, including:  [] Weekly reassessment  [] Focus On Therapeutic Outcomes   [] Sit to stand with foam on chair 2 x 10   [] Sit to stand from chair 2 x 15  [] Reverse walking on treadmill x 5 min  (0.6 speed)   [] Forward step outs with " "contralateral arm swing x 20 repetitions in // bars   [] Lateral step outs with arms abducted x 20 repetitions each direction in // bars  [x] Side stepping with green (light) loop around knees 2 x 30ft each   [x] Hesitation march 2 x 30ft     [] forward step ups 4" 2 x 10 repetitions   [] forward step downs 2" 2 x 10 repetitions   [x] Right knee isometric extension at 60 degrees 10 x 10"    Albertina received cold pack for 00 minutes to right knee.      Home Exercises Provided and Patient Education Provided     Education provided:   - Continuance of home exercise program     Written Home Exercises Provided: Patient instructed to cont prior home exercise program .  Exercises were reviewed and Albertina was able to demonstrate them prior to the end of the session.  Albertina demonstrated good  understanding of the education provided.     See Electronic Medical Record under Patient Instructions for exercises provided prior visit.    Assessment   No adverse effects noted with today's treatment and continued with activities from previous visit. Knee flexion is slightly limited but is improving. Will continue to progress as tolerated towards therapy goals.     Albertina Is progressing well towards her goals.   Patient prognosis is Good.     Patient will continue to benefit from skilled outpatient physical therapy to address the deficits listed in the problem list box on initial evaluation, provide patient/family education and to maximize patient's level of independence in the home and community environment.     Patient's spiritual, cultural and educational needs considered and patient agreeable to plan of care and goals.    Anticipated barriers to physical therapy: none    Goals:   Short Term Goals: 2 weeks  1. Pt will be independent with home exercise program supplement physical therapy in improving functional mobility. (ongoing)  2. Pt will improve right lower extremity strength to at least 75% of left  lower extremity strength as " measured via MicroFet handheld dynamometer in order to improve functional mobility. (In progress)  3. Pt will improve right  knee active range of motion to at least 0 to 100 degrees in order to improve gait. (met)     Long Term Goals: 4 weeks  1. Pt will be independent with updated home exercise program supplement physical therapy in improving functional mobility. (ongoing)  2. Pt will improve lower extremity manual muscle test  to 5/5 bilateral in order to improve functional mobility.  (In progress)  3. Pt will improve right  knee active range of motion to at least 0 to 120 degrees in order to improve gait and ability to perform activities of daily living.(In progress)   4. Pt will improve Focus On Therapeutic Outcomes knee survey score to </= 31% limited in order to demo improved functional mobility.   5. Pt will perform Timed Up and Go in < 10 seconds without AD in order to demo improved gait speed. (In progress)  6. Pt will perform at least 15 sit to stands without UE support on 30 second sit to stand test in order to demo improved ability to perform transfers. (In progress)    Plan     Continue with gait training, lower extremity stretching and strengthening, and balance training.    Maritza Smiley III, PTA

## 2024-11-18 ENCOUNTER — CLINICAL SUPPORT (OUTPATIENT)
Dept: REHABILITATION | Facility: OTHER | Age: 64
End: 2024-11-18
Payer: COMMERCIAL

## 2024-11-18 DIAGNOSIS — M62.81 MUSCLE WEAKNESS OF LOWER EXTREMITY: ICD-10-CM

## 2024-11-18 DIAGNOSIS — Z74.09 IMPAIRED FUNCTIONAL MOBILITY, BALANCE, GAIT, AND ENDURANCE: Primary | ICD-10-CM

## 2024-11-18 PROCEDURE — 97110 THERAPEUTIC EXERCISES: CPT | Mod: PN,CQ

## 2024-11-18 PROCEDURE — 97530 THERAPEUTIC ACTIVITIES: CPT | Mod: PN,CQ

## 2024-11-18 PROCEDURE — 97112 NEUROMUSCULAR REEDUCATION: CPT | Mod: PN,CQ

## 2024-11-19 ENCOUNTER — OFFICE VISIT (OUTPATIENT)
Dept: ORTHOPEDICS | Facility: CLINIC | Age: 64
End: 2024-11-19
Payer: COMMERCIAL

## 2024-11-19 ENCOUNTER — HOSPITAL ENCOUNTER (OUTPATIENT)
Dept: RADIOLOGY | Facility: HOSPITAL | Age: 64
Discharge: HOME | End: 2024-11-19
Attending: NURSE PRACTITIONER
Payer: COMMERCIAL

## 2024-11-19 DIAGNOSIS — Z96.651 S/P TOTAL KNEE REPLACEMENT, RIGHT: ICD-10-CM

## 2024-11-19 DIAGNOSIS — Z96.651 S/P TOTAL KNEE REPLACEMENT, RIGHT: Primary | ICD-10-CM

## 2024-11-19 PROCEDURE — 3044F HG A1C LEVEL LT 7.0%: CPT | Mod: CPTII,S$GLB,, | Performed by: NURSE PRACTITIONER

## 2024-11-19 PROCEDURE — 1160F RVW MEDS BY RX/DR IN RCRD: CPT | Mod: CPTII,S$GLB,, | Performed by: NURSE PRACTITIONER

## 2024-11-19 PROCEDURE — 1125F AMNT PAIN NOTED PAIN PRSNT: CPT | Mod: CPTII,S$GLB,, | Performed by: NURSE PRACTITIONER

## 2024-11-19 PROCEDURE — 99024 POSTOP FOLLOW-UP VISIT: CPT | Mod: S$GLB,,, | Performed by: NURSE PRACTITIONER

## 2024-11-19 PROCEDURE — 1159F MED LIST DOCD IN RCRD: CPT | Mod: CPTII,S$GLB,, | Performed by: NURSE PRACTITIONER

## 2024-11-19 PROCEDURE — 1101F PT FALLS ASSESS-DOCD LE1/YR: CPT | Mod: CPTII,S$GLB,, | Performed by: NURSE PRACTITIONER

## 2024-11-19 PROCEDURE — 99999 PR PBB SHADOW E&M-EST. PATIENT-LVL III: CPT | Mod: PBBFAC,,, | Performed by: NURSE PRACTITIONER

## 2024-11-19 PROCEDURE — 3288F FALL RISK ASSESSMENT DOCD: CPT | Mod: CPTII,S$GLB,, | Performed by: NURSE PRACTITIONER

## 2024-11-19 PROCEDURE — 73562 X-RAY EXAM OF KNEE 3: CPT | Mod: 26,RT,, | Performed by: RADIOLOGY

## 2024-11-19 PROCEDURE — 73562 X-RAY EXAM OF KNEE 3: CPT | Mod: TC,RT

## 2024-11-19 NOTE — PROGRESS NOTES
Albertina Sim presents for 6 week post-operative visit following a right total knee arthroplasty performed by Dr. Cardenas on 10/7/2024.      Exam:      Ambulating well without assistive device.  Incision is healed without drainage or erythema.   ROM:0-110    Post-operative radiographs reviewed today revealing a well fixed and aligned prosthesis.    A/P:  6 weeks s/p right total knee arthroplasty  - Outpatient PT ongoing: she has been going to the [facility] location and will finish up next week    - Follow up in 6 weeks with Dr. Cardenas. Pt will call clinic with problems/concerns.

## 2024-12-31 ENCOUNTER — OFFICE VISIT (OUTPATIENT)
Dept: ORTHOPEDICS | Facility: CLINIC | Age: 64
End: 2024-12-31
Payer: COMMERCIAL

## 2024-12-31 VITALS — HEIGHT: 66 IN | BODY MASS INDEX: 33.33 KG/M2 | WEIGHT: 207.38 LBS

## 2024-12-31 DIAGNOSIS — Z96.651 S/P TOTAL KNEE REPLACEMENT, RIGHT: Primary | ICD-10-CM

## 2024-12-31 PROCEDURE — 99999 PR PBB SHADOW E&M-EST. PATIENT-LVL III: CPT | Mod: PBBFAC,,, | Performed by: ORTHOPAEDIC SURGERY

## 2024-12-31 PROCEDURE — 99024 POSTOP FOLLOW-UP VISIT: CPT | Mod: S$GLB,,, | Performed by: ORTHOPAEDIC SURGERY

## 2024-12-31 PROCEDURE — 1101F PT FALLS ASSESS-DOCD LE1/YR: CPT | Mod: CPTII,S$GLB,, | Performed by: ORTHOPAEDIC SURGERY

## 2024-12-31 PROCEDURE — 1125F AMNT PAIN NOTED PAIN PRSNT: CPT | Mod: CPTII,S$GLB,, | Performed by: ORTHOPAEDIC SURGERY

## 2024-12-31 PROCEDURE — 3044F HG A1C LEVEL LT 7.0%: CPT | Mod: CPTII,S$GLB,, | Performed by: ORTHOPAEDIC SURGERY

## 2024-12-31 PROCEDURE — 3288F FALL RISK ASSESSMENT DOCD: CPT | Mod: CPTII,S$GLB,, | Performed by: ORTHOPAEDIC SURGERY

## 2024-12-31 PROCEDURE — 1159F MED LIST DOCD IN RCRD: CPT | Mod: CPTII,S$GLB,, | Performed by: ORTHOPAEDIC SURGERY

## 2024-12-31 NOTE — PROGRESS NOTES
Subjective:     HPI:   Albertina Sim is a 64 y.o. female who presents 12 weeks out from right TKA    Date of surgery: R VTKA 10/7/24  PT    History of Present Illness    CHIEF COMPLAINT:  - Albertina presents today for three-month post-operative follow-up status post right total knee arthroplasty performed on October 7th.    HPI:  Albertina presents for a 3-month follow-up after right knee surgery on October 7th. She reports stiffness when getting up, which improves with walking. She experiences stiffness upon rising, but it improves with ambulation. Her knee sometimes feels very warm, including at the time of the visit. She also reports pain in the lower part of her knee. She has noticed her leg is straighter now compared to before the surgery.    She mentions persistent numbness on one side of her knee and some difficulty when bending her knee, describing it as feeling tight. She also reports discomfort when lying on her right side, which she attributes partly to the numbness. She is currently taking Tylenol for pain management, typically once in the morning and once before bed. She uses a cane for the first couple of steps when getting up and for long distances. She has completed her prescribed physical therapy.    She expresses interest in returning to bicycle riding, having previously used a stationary bike during therapy but not yet attempting to ride a regular bicycle outside. She denies using a walker or crutches. She denies any medical diagnoses.    MEDICATIONS:  - Tylenol: 1 tablet twice daily    SURGICAL HISTORY:  - Right total knee arthroplasty: October 7th    WORK STATUS:  - Previously rode bicycle to work  - Expresses interest in returning to bicycle commuting         Medications: tylenol BID     Assistive Devices: cane long distances first few steps     PT: finished    Overall making progress  Stiff when first gets up, better with walking  Warm  Some pain medial tibia/pes  Numb lateral        Objective:    Body mass index is 33.47 kg/m².  Exam:    Gait: limp/antalgic none    Incision: healed    Stability:  Knee stable anterior-posterior varus and valgus stresses, no extensor lag    Extension: 0    Flexion: 108    Pre-op 5-105  SB 6 week 0-110  Pt 0-110    Sore med pes  Not hypersensitive      Physical Exam    Skin: Incision has healed beautifully. Numbness at incision site.  Musculoskeletal: Little soreness at hamstring attachment. Knee flexion up to 108 degrees. Clicking sound in knee.  IMAGING:  - X-rays right knee 6 weeks post-surgery: Metal cap and polyethylene piece in place, side view and kneecap view looking good, components matching the bone nicely  - Pre-surgery x-rays right knee: Severe bone-on-bone osteoarthritis  - Pre-surgery x-rays left knee: Relatively normal appearance         Imaging:  None today    Watch tibia RL line medial from 6 week XR     Results                Assessment:       ICD-10-CM ICD-9-CM   1. S/P total knee replacement, right  Z96.651 V43.65      Doing well - making progress     Plan:       Patient is doing very well with their total knee arthroplasty.  They will continue with their routine care of the knee replacement and see me back for their follow-up at the routine interval.  If there are problems in the interim they will see me back sooner. Prophylactic antibiotic protocol given and explained to patient.     Assessment & Plan    M25.561 Pain in right knee  M62.451 Contracture of muscle, right thigh  Z47.1 Aftercare following joint replacement surgery  Z96.652 Presence of left artificial knee joint  Z99.89 Dependence on other enabling machines and devices    RIGHT KNEE PAIN:  - Prescribed a topical cream containing anti-inflammatory and numbing agents to help with nighttime discomfort.  - Avoid kneeling directly on the knee, but can use padding if necessary.  - Continue with daily activities as tolerated, pacing oneself and using ice or heat as needed.  - Use indoor exercise bike  to keep knee moving and lubricated, especially during cold weather.  - Avoid falling on the knee.    AFTERCARE FOLLOWING JOINT REPLACEMENT:  - Resume bicycle riding in the spring.  - Try riding stationary bike while watching TV or listening to music to overcome mental block.    FOLLOW-UP CARE:  - Follow up in 9 months for 1-year anniversary x-rays.         Try exercise bike   Rx compound cream     9 month follow up for annual xrays     This note was generated with the assistance of ambient listening technology. Verbal consent was obtained by the patient and accompanying visitor(s) for the recording of patient appointment to facilitate this note. I attest to having reviewed and edited the generated note for accuracy, though some syntax or spelling errors may persist. Please contact the author of this note for any clarification.      No orders of the defined types were placed in this encounter.            Past Medical History:   Diagnosis Date    Arthritis     Disorder of kidney and ureter     Hyperlipidemia     Sickle cell trait     Vitamin D deficiency        Past Surgical History:   Procedure Laterality Date    ARTHROPLASTY, KNEE, TOTAL, USING COMPUTER-ASSISTED NAVIGATION Right 10/7/2024    Procedure: ARTHROPLASTY, KNEE, TOTAL, USING 41[employer] Parameter COMPUTER-ASSISTED NAVIGATION: RIGHT: DEPUY - ATTUNE;  Surgeon: Ramses Cardenas III, MD;  Location: [facility];  Service: Orthopedics;  Laterality: Right;    COLONOSCOPY N/A 2/14/2023    Procedure: COLONOSCOPY;  Surgeon: Alessandro Cedeño MD;  Location: [facility] ([address] [place] [address]);  Service: Endoscopy;  Laterality: N/A;  instr emailed-ml  2/7/23 pre call complete - [email or web address]       Family History   Problem Relation Name Age of Onset    Atrial fibrillation Mother Rosemarie Sim         dementia, 91    Hypertension Mother Rosemarie Sim     Arthritis Mother Rosemarie Sim     Heart disease Mother Rosemarie Sim     Cancer Father Tushar Sim         lung, 90    Glaucoma Father Tusharjulio cesar Sim     Stroke  Brother      Cancer Sister Perri     Cancer Brother Garo     Stroke Brother Vince Workman        Social History     Socioeconomic History    Marital status: Single   Occupational History     Employer: [employer]   Tobacco Use    Smoking status: Never    Smokeless tobacco: Never   Substance and Sexual Activity    Alcohol use: Not Currently     Comment: rare    Drug use: No    Sexual activity: Not Currently   Social History Narrative    At [employer], 10 years in May. Really enjoys her employment.  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    Before the [employer] in [place] [employer] For 15 years, really tied her down, got washed out by [employer]    Both parents passed    Stopped riding bike to work this summer b/o heat    Job stressed, several people left dept.    She walks her Frisian dog twice daily.    She's doing yoga and enjoying the relaxation.    Single, no children.    Youngest in her family of origin. Grieving the death of her sister who  of a brain tumor, at the age of 64.    She was a twin and her twin sister, identical twin sister and now has memory problems.        No current physical activity.     Social Drivers of Health     Financial Resource Strain: Patient Declined (10/7/2024)    Overall Financial Resource Strain (CARDIA)     Difficulty of Paying Living Expenses: Patient declined   Food Insecurity: Patient Declined (10/7/2024)    Hunger Vital Sign     Worried About Running Out of Food in the Last Year: Patient declined     Ran Out of Food in the Last Year: Patient declined   Transportation Needs: Patient Declined (10/7/2024)    TRANSPORTATION NEEDS     Transportation : Patient declined   Physical Activity: Insufficiently Active (2024)    Exercise Vital Sign     Days of Exercise per Week: 3 days     Minutes of Exercise per Session: 30 min   Stress: Patient Declined (10/7/2024)    [employer] [place] [employer] -  Occupational Stress Questionnaire     Feeling of Stress : Patient declined   Housing Stability: Patient Declined (10/7/2024)    Housing Stability Vital Sign     Unable to Pay for Housing in the Last Year: Patient declined     Homeless in the Last Year: Patient declined

## 2025-01-11 NOTE — PROGRESS NOTES
CC: Annual  HPI: Pt is a 60 y.o.  female who presents for routine annual exam. She works for [email or web address] with [employer]. She is postmenopausal.  Denies any PMB.  Reports VIRGIL with coughing and laughing.  The patient participates in regular exercise: no.  The patient does not smoke.  The patient wears seatbelts.   Pt denies any domestic violence.  Screening MMG is later this week.  Colonoscopy is due in 2021.      FH:  Breast cancer: none  Colon cancer: none  Ovarian cancer: none  Endometrial cancer: none    ROS:  GENERAL: Feeling well overall. Denies fever or chills.   SKIN: Denies rash or lesions.   HEAD: Denies head injury or headache.   NODES: Denies enlarged lymph nodes.   CHEST: Denies chest pain or shortness of breath.   CARDIOVASCULAR: Denies palpitations or left sided chest pain.   ABDOMEN: No abdominal pain, constipation, diarrhea, nausea, vomiting or rectal bleeding.   URINARY: No dysuria, hematuria, or burning on urination.  REPRODUCTIVE: See HPI.   BREASTS: Denies pain, lumps, or nipple discharge.   HEMATOLOGIC: No easy bruisability or excessive bleeding.   MUSCULOSKELETAL: Denies joint pain or swelling.   NEUROLOGIC: Denies syncope or weakness.   PSYCHIATRIC: Denies depression, anxiety or mood swings.    PE:   APPEARANCE: Well nourished, well developed, Black or  female in no acute distress.  NODES: no cervical, supraclavicular, or inguinal lymphadenopathy  BREASTS: Symmetrical, no skin changes or visible lesions. No palpable masses, nipple discharge or adenopathy bilaterally.  ABDOMEN: Soft. No tenderness or masses. No distention. No hernias palpated. No CVA tenderness.  VULVA: No lesions. Normal external female genitalia.  URETHRAL MEATUS: Normal size and location, no lesions, no prolapse.  URETHRA: No masses, tenderness, or prolapse.  VAGINA: Moist. No lesions or lacerations noted. No abnormal discharge present. No odor present.   CERVIX: No lesions or discharge. No  cervical motion tenderness.   UTERUS: Normal size, regular shape, mobile, non-tender.  ADNEXA: No tenderness. No fullness or masses palpated in the adnexal regions.   ANUS PERINEUM: Normal.      Diagnosis:  1. Women's annual routine gynecological examination    2. Routine gynecological examination    3. Encounter for Papanicolaou smear for cervical cancer screening    4. Screening for HPV (human papillomavirus)        Plan:   Pap/ HPV  MMG on 11/20/20  Discussed interventions for VIRGIL- Kegel evercises, timed voiding and decreasing bladder irritants   Discussed if worsens to notify clinic and will refer to Uro Gyn      Orders Placed This Encounter    HPV High Risk Genotypes, PCR    Liquid-Based Pap Smear, Screening       Patient was counseled today on the new ACS guidelines for cervical cytology screening as well as the current recommendations for breast cancer screening. She was counseled to follow up with her PCP for other routine health maintenance. Counseling session lasted approximately 10 minutes, and all her questions were answered.    Follow-up with me in 1 year for routine exam    Milly Mendiola, RANDYP-C  ]     Applied

## 2025-03-20 ENCOUNTER — HOSPITAL ENCOUNTER (OUTPATIENT)
Dept: RADIOLOGY | Facility: HOSPITAL | Age: 65
Discharge: HOME | End: 2025-03-20
Attending: INTERNAL MEDICINE
Payer: COMMERCIAL

## 2025-03-20 DIAGNOSIS — Z12.31 ENCOUNTER FOR SCREENING MAMMOGRAM FOR BREAST CANCER: ICD-10-CM

## 2025-03-20 PROCEDURE — 77063 BREAST TOMOSYNTHESIS BI: CPT | Mod: TC

## 2025-03-20 PROCEDURE — 77067 SCR MAMMO BI INCL CAD: CPT | Mod: 26,,, | Performed by: RADIOLOGY

## 2025-03-20 PROCEDURE — 77063 BREAST TOMOSYNTHESIS BI: CPT | Mod: 26,,, | Performed by: RADIOLOGY

## 2025-03-23 ENCOUNTER — RESULTS FOLLOW-UP (OUTPATIENT)
Dept: INTERNAL MEDICINE | Facility: CLINIC | Age: 65
End: 2025-03-23

## 2025-04-07 ENCOUNTER — PATIENT MESSAGE (OUTPATIENT)
Dept: ADMINISTRATIVE | Facility: HOSPITAL | Age: 65
End: 2025-04-07
Payer: COMMERCIAL

## 2025-07-21 ENCOUNTER — PATIENT MESSAGE (OUTPATIENT)
Dept: ADMINISTRATIVE | Facility: HOSPITAL | Age: 65
End: 2025-07-21
Payer: COMMERCIAL

## 2025-07-30 ENCOUNTER — LAB VISIT (OUTPATIENT)
Dept: LAB | Facility: HOSPITAL | Age: 65
End: 2025-07-30
Payer: COMMERCIAL

## 2025-07-30 ENCOUNTER — OFFICE VISIT (OUTPATIENT)
Dept: INTERNAL MEDICINE | Facility: CLINIC | Age: 65
End: 2025-07-30
Payer: COMMERCIAL

## 2025-07-30 VITALS
SYSTOLIC BLOOD PRESSURE: 116 MMHG | WEIGHT: 185.19 LBS | HEART RATE: 72 BPM | HEIGHT: 66 IN | OXYGEN SATURATION: 99 % | BODY MASS INDEX: 29.76 KG/M2 | DIASTOLIC BLOOD PRESSURE: 72 MMHG

## 2025-07-30 DIAGNOSIS — Z00.00 ENCOUNTER FOR ANNUAL PHYSICAL EXAM: Primary | ICD-10-CM

## 2025-07-30 DIAGNOSIS — D57.3 SICKLE CELL TRAIT: ICD-10-CM

## 2025-07-30 DIAGNOSIS — N18.31 CHRONIC KIDNEY DISEASE, STAGE 3A: ICD-10-CM

## 2025-07-30 DIAGNOSIS — Z00.00 ENCOUNTER FOR ANNUAL PHYSICAL EXAM: ICD-10-CM

## 2025-07-30 LAB
ABSOLUTE EOSINOPHIL (OHS): 0.04 K/UL
ABSOLUTE MONOCYTE (OHS): 0.56 K/UL (ref 0.3–1)
ABSOLUTE NEUTROPHIL COUNT (OHS): 3.58 K/UL (ref 1.8–7.7)
ALBUMIN SERPL BCP-MCNC: 4.1 G/DL (ref 3.5–5.2)
ALP SERPL-CCNC: 72 UNIT/L (ref 40–150)
ALT SERPL W/O P-5'-P-CCNC: 9 UNIT/L (ref 0–55)
ANION GAP (OHS): 8 MMOL/L (ref 8–16)
AST SERPL-CCNC: 22 UNIT/L (ref 0–50)
BASOPHILS # BLD AUTO: 0.02 K/UL
BASOPHILS NFR BLD AUTO: 0.4 %
BILIRUB SERPL-MCNC: 0.9 MG/DL (ref 0.1–1)
BUN SERPL-MCNC: 13 MG/DL (ref 8–23)
CALCIUM SERPL-MCNC: 10 MG/DL (ref 8.7–10.5)
CHLORIDE SERPL-SCNC: 107 MMOL/L (ref 95–110)
CHOLEST SERPL-MCNC: 166 MG/DL (ref 120–199)
CHOLEST/HDLC SERPL: 3.1 {RATIO} (ref 2–5)
CO2 SERPL-SCNC: 25 MMOL/L (ref 23–29)
CREAT SERPL-MCNC: 1 MG/DL (ref 0.5–1.4)
EAG (OHS): 103 MG/DL (ref 68–131)
ERYTHROCYTE [DISTWIDTH] IN BLOOD BY AUTOMATED COUNT: 13.7 % (ref 11.5–14.5)
GFR SERPLBLD CREATININE-BSD FMLA CKD-EPI: >60 ML/MIN/1.73/M2
GLUCOSE SERPL-MCNC: 86 MG/DL (ref 70–110)
HBA1C MFR BLD: 5.2 % (ref 4–5.6)
HCT VFR BLD AUTO: 34.2 % (ref 37–48.5)
HDLC SERPL-MCNC: 54 MG/DL (ref 40–75)
HDLC SERPL: 32.5 % (ref 20–50)
HGB BLD-MCNC: 11.5 GM/DL (ref 12–16)
IMM GRANULOCYTES # BLD AUTO: 0.01 K/UL (ref 0–0.04)
IMM GRANULOCYTES NFR BLD AUTO: 0.2 % (ref 0–0.5)
LDLC SERPL CALC-MCNC: 102.6 MG/DL (ref 63–159)
LYMPHOCYTES # BLD AUTO: 1.49 K/UL (ref 1–4.8)
MCH RBC QN AUTO: 29.6 PG (ref 27–31)
MCHC RBC AUTO-ENTMCNC: 33.6 G/DL (ref 32–36)
MCV RBC AUTO: 88 FL (ref 82–98)
NONHDLC SERPL-MCNC: 112 MG/DL
NUCLEATED RBC (/100WBC) (OHS): 0 /100 WBC
PLATELET # BLD AUTO: 211 K/UL (ref 150–450)
PMV BLD AUTO: 10.3 FL (ref 9.2–12.9)
POTASSIUM SERPL-SCNC: 4.3 MMOL/L (ref 3.5–5.1)
PROT SERPL-MCNC: 7.4 GM/DL (ref 6–8.4)
RBC # BLD AUTO: 3.88 M/UL (ref 4–5.4)
RELATIVE EOSINOPHIL (OHS): 0.7 %
RELATIVE LYMPHOCYTE (OHS): 26.1 % (ref 18–48)
RELATIVE MONOCYTE (OHS): 9.8 % (ref 4–15)
RELATIVE NEUTROPHIL (OHS): 62.8 % (ref 38–73)
SODIUM SERPL-SCNC: 140 MMOL/L (ref 136–145)
TRIGL SERPL-MCNC: 47 MG/DL (ref 30–150)
TSH SERPL-ACNC: 1.87 UIU/ML (ref 0.4–4)
WBC # BLD AUTO: 5.7 K/UL (ref 3.9–12.7)

## 2025-07-30 PROCEDURE — 99999 PR PBB SHADOW E&M-EST. PATIENT-LVL III: CPT | Mod: PBBFAC,,, | Performed by: INTERNAL MEDICINE

## 2025-07-30 PROCEDURE — 1126F AMNT PAIN NOTED NONE PRSNT: CPT | Mod: CPTII,S$GLB,, | Performed by: INTERNAL MEDICINE

## 2025-07-30 PROCEDURE — 1159F MED LIST DOCD IN RCRD: CPT | Mod: CPTII,S$GLB,, | Performed by: INTERNAL MEDICINE

## 2025-07-30 PROCEDURE — 1101F PT FALLS ASSESS-DOCD LE1/YR: CPT | Mod: CPTII,S$GLB,, | Performed by: INTERNAL MEDICINE

## 2025-07-30 PROCEDURE — 99397 PER PM REEVAL EST PAT 65+ YR: CPT | Mod: S$GLB,,, | Performed by: INTERNAL MEDICINE

## 2025-07-30 PROCEDURE — 36415 COLL VENOUS BLD VENIPUNCTURE: CPT

## 2025-07-30 PROCEDURE — 85025 COMPLETE CBC W/AUTO DIFF WBC: CPT

## 2025-07-30 PROCEDURE — 3078F DIAST BP <80 MM HG: CPT | Mod: CPTII,S$GLB,, | Performed by: INTERNAL MEDICINE

## 2025-07-30 PROCEDURE — 84443 ASSAY THYROID STIM HORMONE: CPT

## 2025-07-30 PROCEDURE — 3074F SYST BP LT 130 MM HG: CPT | Mod: CPTII,S$GLB,, | Performed by: INTERNAL MEDICINE

## 2025-07-30 PROCEDURE — 80061 LIPID PANEL: CPT

## 2025-07-30 PROCEDURE — 83036 HEMOGLOBIN GLYCOSYLATED A1C: CPT

## 2025-07-30 PROCEDURE — 82565 ASSAY OF CREATININE: CPT

## 2025-07-30 PROCEDURE — 3288F FALL RISK ASSESSMENT DOCD: CPT | Mod: CPTII,S$GLB,, | Performed by: INTERNAL MEDICINE

## 2025-07-30 PROCEDURE — 3008F BODY MASS INDEX DOCD: CPT | Mod: CPTII,S$GLB,, | Performed by: INTERNAL MEDICINE

## 2025-07-30 NOTE — PROGRESS NOTES
Subjective:       Patient ID: Albertina Sim is a 65 y.o. female.    Chief Complaint: Annual Exam    HPI    Presents  for annual exam.     Had right TKR in October 2024. Pain has improved since.     Has been on wegovy since 2024 through weight loss program at [facility]. Has lost over 50 IBS since starting. Program includes education about nutrition and exercise.     Was having chest pain in April 2023, cardiac eval was negative. Had CT calcium score which was zero.     Sickle cell traint: hemoglobin ~ 10-11 at baseline     Health Maintenance:  Colon Cancer Screening: colonoscopy in 2023 with polyps removed. Due in 2028.   Mammogram: normal March 2025   DEXA: normal July 2023. No need for repeat per endocrinologist but will consider in 2 years since weight loss.   HIV: negative 2023   Hep C: negative 2023   Lipids: order today   Vaccines:  Discussed RSV today. Otherwise up to date       Review of Systems   Constitutional:  Negative for activity change, appetite change and chills.   HENT:  Negative for ear pain, sinus pressure/congestion and sneezing.    Respiratory:  Negative for cough and shortness of breath.    Cardiovascular:  Negative for chest pain, palpitations and leg swelling.   Gastrointestinal:  Negative for abdominal distention, abdominal pain, constipation, diarrhea, nausea and vomiting.   Genitourinary:  Negative for dysuria and hematuria.   Musculoskeletal:  Negative for arthralgias, back pain and myalgias.   Neurological:  Negative for dizziness and headaches.   Psychiatric/Behavioral:  Negative for agitation. The patient is not nervous/anxious.            Past Medical History:   Diagnosis Date    Arthritis     Disorder of kidney and ureter     Hyperlipidemia     Sickle cell trait     Vitamin D deficiency      Past Surgical History:   Procedure Laterality Date    ARTHROPLASTY, KNEE, TOTAL, USING COMPUTER-ASSISTED NAVIGATION Right 10/7/2024    Procedure: ARTHROPLASTY, KNEE, TOTAL, USING VELYS  COMPUTER-ASSISTED NAVIGATION: RIGHT: DEPUY - ATTUNE;  Surgeon: Ramses Cardenas III, MD;  Location: [facility];  Service: Orthopedics;  Laterality: Right;    COLONOSCOPY N/A 2/14/2023    Procedure: COLONOSCOPY;  Surgeon: Alessandro Cedeño MD;  Location: [facility] ENDO (4TH FLR);  Service: Endoscopy;  Laterality: N/A;  instr emailed-ml  2/7/23 pre call complete - marychuy      Patient Active Problem List   Diagnosis    Hallux valgus (acquired)    DJD (degenerative joint disease) of knee    Sciatica of right side    Anemia, sickle trait, documented since 2005    Sickle cell trait    Chronic midline low back pain without sciatica    Primary osteoarthritis of left knee    Chronic pain of left knee    Elevated blood pressure reading    Vitamin D deficiency    Nocturnal leg cramps    Screen for colon cancer, next 03/2022    Atypical chest pain    ARRIETA (dyspnea on exertion)    Hypercholesterolemia    Nonspecific abnormal electrocardiogram (ECG) (EKG)    Abnormal chest x-ray    Primary osteoarthritis of right knee    Chronic kidney disease, stage 3a    Body mass index (BMI) of 35.0 to 35.9 with comorbidity    Chronic pain of right knee    Decreased range of motion of right knee    Impaired functional mobility, balance, gait, and endurance    Muscle weakness of lower extremity        Objective:      Physical Exam  Constitutional:       Appearance: Normal appearance.   HENT:      Head: Normocephalic.      Right Ear: Tympanic membrane normal.      Left Ear: Tympanic membrane normal.      Nose: Nose normal.   Cardiovascular:      Rate and Rhythm: Normal rate and regular rhythm.      Pulses: Normal pulses.      Heart sounds: Normal heart sounds.   Pulmonary:      Effort: Pulmonary effort is normal.      Breath sounds: Normal breath sounds.   Abdominal:      General: Abdomen is flat. Bowel sounds are normal.      Palpations: Abdomen is soft.   Musculoskeletal:         General: Normal range of motion.      Cervical back: Normal range of  motion and neck supple.   Skin:     General: Skin is warm and dry.   Neurological:      General: No focal deficit present.      Mental Status: She is alert and oriented to person, place, and time.   Psychiatric:         Mood and Affect: Mood normal.         Assessment:       Problem List Items Addressed This Visit          Renal/    Chronic kidney disease, stage 3a         Plan:           Encounter for annual physical exam  Colon Cancer Screening: colonoscopy in 2023 with polyps removed. Due in 2028.   Mammogram: normal March 2025   DEXA: normal July 2023. No need for repeat per endocrinologist but will consider in 2 years since weight loss.   HIV: negative 2023   Hep C: negative 2023   Lipids: order today   Vaccines:  Discussed RSV today. Otherwise up to date   Annual wellness exam completed.     All medications, histories, and concerns reviewed, reconciled, and addressed.     Appropriate Screenings per pt's sex and age have been reviewed and discussed with pt.       Chronic kidney disease, stage 3a  -     Comprehensive Metabolic Panel; Future; Expected date: 07/30/2025  Improved on labs last year. Check again today     Sickle cell trait  -     CBC Auto Differential; Future; Expected date: 07/30/2025  Hemoglobin in normal range last year. Prior to this was around 11. Check today.                  Angelia Roldan MD   Internal Medicine   Primary Care

## (undated) DEVICE — UNDERGLOVES BIOGEL PI SIZE 7.5

## (undated) DEVICE — NDL HYPO STD REG BVL 18GX1.5IN

## (undated) DEVICE — PULSAVAC ZIMMER

## (undated) DEVICE — BLADE DUAL CUT SAG 35X64X.89MM

## (undated) DEVICE — SYR 50CC LL

## (undated) DEVICE — DRAPE VELYS SATELLITE STATION

## (undated) DEVICE — SYS REVOLUTION CEMENT MIXING

## (undated) DEVICE — GLOVE BIOGEL SKINSENSE PI 8.0

## (undated) DEVICE — TUBE SUCTION FRAZIER VENT 10FR

## (undated) DEVICE — ALCOHOL 70% ANTISEPTIC ISO 4OZ

## (undated) DEVICE — SPONGE GAUZE 16PLY 4X4

## (undated) DEVICE — VELYS ROBOT-ASSISTED SOLUTION ARRAY DRILL PIN 175 MM X 4 MM
Type: IMPLANTABLE DEVICE | Site: KNEE | Status: NON-FUNCTIONAL
Brand: VELYS
Removed: 2024-10-07

## (undated) DEVICE — PENCIL ROCKER SWITCH 10FT CORD

## (undated) DEVICE — DRESSING AQUACEL AG RBBN 2X45

## (undated) DEVICE — MARKER SKIN RULER STERILE

## (undated) DEVICE — SUT VICRYL 3-0 27 CT-1

## (undated) DEVICE — VELYS ROBOT-ASSISTED SOLUTION ARRAY DRILL PIN 125 MM X 4 MM
Type: IMPLANTABLE DEVICE | Site: KNEE | Status: NON-FUNCTIONAL
Brand: VELYS
Removed: 2024-10-07

## (undated) DEVICE — ATTUNE PINNING SYSTEM
Type: IMPLANTABLE DEVICE | Site: KNEE | Status: NON-FUNCTIONAL
Brand: ATTUNE
Removed: 2024-10-07

## (undated) DEVICE — SOL BETADINE 5%

## (undated) DEVICE — DRAPE SURG W/TWL 17 5/8X23

## (undated) DEVICE — DRESSING TELFA N ADH 3X8

## (undated) DEVICE — SUT 2/0 36IN COATED VICRYL

## (undated) DEVICE — SUT MCRYL PLUS 4-0 PS2 27IN

## (undated) DEVICE — TOWEL OR XRAY WHITE 17X26IN

## (undated) DEVICE — DRAPE VELYS DEVICE STERILE

## (undated) DEVICE — SOL NACL IRR 1000ML BTL

## (undated) DEVICE — COVER LIGHT HANDLE 80/CA

## (undated) DEVICE — SPONGE COTTON TRAY 4X4IN

## (undated) DEVICE — GOWN SMARTGOWN 3XL XLONG

## (undated) DEVICE — BRUSH SCRUB HIBICLENS 4%

## (undated) DEVICE — TAPE SILK 3IN

## (undated) DEVICE — HOOD T7 W/ PEEL AWAY LENS

## (undated) DEVICE — GLOVE BIOGEL PI MICRO SZ 7

## (undated) DEVICE — SUT 1 36IN COATED VICRYL UN

## (undated) DEVICE — SET VELYS PURESIGHT ARRAY KNEE

## (undated) DEVICE — DRAPE TOP 53X102IN

## (undated) DEVICE — DRESSING TRANS 4X4 TEGADERM

## (undated) DEVICE — SOL NACL IRR 3000ML

## (undated) DEVICE — KIT TOTAL KNEE TKOFG OMC

## (undated) DEVICE — BLADE RECIP DS OFST 70X1X12.5

## (undated) DEVICE — DRAPE INCISE IOBAN 2 23X33IN

## (undated) DEVICE — SUT STRATAFIX PDS 1 CTX 18IN

## (undated) DEVICE — PAD KNEE POLAR XL

## (undated) DEVICE — DRAPE T EXTRM SURG 121X128X90

## (undated) DEVICE — DRAPE THREE-QTR REINF 53X77IN

## (undated) DEVICE — ELECTRODE REM PLYHSV RETURN 9

## (undated) DEVICE — UNDERGLOVES BIOGEL PI SIZE 8.5

## (undated) DEVICE — ADHESIVE DERMABOND ADVANCED

## (undated) DEVICE — BLADE VELYS OSCILLATING SAW